# Patient Record
Sex: FEMALE | Race: BLACK OR AFRICAN AMERICAN | Employment: UNEMPLOYED | URBAN - METROPOLITAN AREA
[De-identification: names, ages, dates, MRNs, and addresses within clinical notes are randomized per-mention and may not be internally consistent; named-entity substitution may affect disease eponyms.]

---

## 2018-05-10 ENCOUNTER — HOSPITAL ENCOUNTER (OUTPATIENT)
Age: 63
Discharge: HOME | End: 2018-05-12
Payer: COMMERCIAL

## 2018-05-10 PROCEDURE — 87088 URINE BACTERIA CULTURE: CPT

## 2018-05-12 LAB — URINE CULTURE, ROUTINE: NORMAL

## 2018-06-11 ENCOUNTER — HOSPITAL ENCOUNTER (OUTPATIENT)
Age: 63
Discharge: HOME | End: 2018-06-13
Payer: COMMERCIAL

## 2018-06-11 ENCOUNTER — HOSPITAL ENCOUNTER (OUTPATIENT)
Dept: GENERAL RADIOLOGY | Age: 63
Discharge: HOME | End: 2018-06-13
Payer: COMMERCIAL

## 2018-06-11 DIAGNOSIS — R07.9 CHEST PAIN, UNSPECIFIED TYPE: ICD-10-CM

## 2018-06-11 PROCEDURE — 71046 X-RAY EXAM CHEST 2 VIEWS: CPT

## 2018-06-12 ENCOUNTER — HOSPITAL ENCOUNTER (OUTPATIENT)
Dept: MAMMOGRAPHY | Age: 63
Discharge: HOME | End: 2018-06-14
Payer: COMMERCIAL

## 2018-06-12 DIAGNOSIS — Z12.39 BREAST SCREENING: ICD-10-CM

## 2018-06-12 DIAGNOSIS — N64.4 MASTALGIA: ICD-10-CM

## 2018-06-12 PROCEDURE — 77067 SCR MAMMO BI INCL CAD: CPT

## 2018-12-02 ENCOUNTER — HOSPITAL ENCOUNTER (EMERGENCY)
Age: 63
Discharge: HOME | End: 2018-12-03
Attending: EMERGENCY MEDICINE
Payer: COMMERCIAL

## 2018-12-02 DIAGNOSIS — R00.2 PALPITATIONS: Primary | ICD-10-CM

## 2018-12-02 DIAGNOSIS — R07.9 CHEST PAIN, UNSPECIFIED TYPE: ICD-10-CM

## 2018-12-02 PROCEDURE — 99285 EMERGENCY DEPT VISIT HI MDM: CPT

## 2018-12-02 PROCEDURE — 6370000000 HC RX 637 (ALT 250 FOR IP): Performed by: EMERGENCY MEDICINE

## 2018-12-02 PROCEDURE — 94760 N-INVAS EAR/PLS OXIMETRY 1: CPT

## 2018-12-02 RX ORDER — ASPIRIN 81 MG/1
324 TABLET, CHEWABLE ORAL ONCE
Status: COMPLETED | OUTPATIENT
Start: 2018-12-03 | End: 2018-12-02

## 2018-12-02 RX ADMIN — ASPIRIN 81 MG 324 MG: 81 TABLET ORAL at 23:55

## 2018-12-02 ASSESSMENT — ENCOUNTER SYMPTOMS
EYE DISCHARGE: 0
ABDOMINAL DISTENTION: 0
WHEEZING: 0
DIARRHEA: 0
EYE PAIN: 0
VOMITING: 0
EYE REDNESS: 0
SHORTNESS OF BREATH: 1
SORE THROAT: 0
NAUSEA: 0
COUGH: 0
SINUS PRESSURE: 0
BACK PAIN: 0

## 2018-12-02 ASSESSMENT — PAIN SCALES - GENERAL: PAINLEVEL_OUTOF10: 5

## 2018-12-02 ASSESSMENT — PAIN DESCRIPTION - ORIENTATION: ORIENTATION: MID

## 2018-12-02 ASSESSMENT — PAIN DESCRIPTION - PAIN TYPE: TYPE: ACUTE PAIN

## 2018-12-03 ENCOUNTER — APPOINTMENT (OUTPATIENT)
Dept: GENERAL RADIOLOGY | Age: 63
End: 2018-12-03
Payer: COMMERCIAL

## 2018-12-03 VITALS
BODY MASS INDEX: 40.5 KG/M2 | OXYGEN SATURATION: 98 % | SYSTOLIC BLOOD PRESSURE: 158 MMHG | DIASTOLIC BLOOD PRESSURE: 73 MMHG | HEIGHT: 66 IN | TEMPERATURE: 98.1 F | HEART RATE: 94 BPM | RESPIRATION RATE: 20 BRPM | WEIGHT: 252 LBS

## 2018-12-03 LAB
ANION GAP SERPL CALCULATED.3IONS-SCNC: 13 MMOL/L (ref 7–16)
BUN BLDV-MCNC: 14 MG/DL (ref 8–23)
CALCIUM SERPL-MCNC: 9.5 MG/DL (ref 8.6–10.2)
CHLORIDE BLD-SCNC: 99 MMOL/L (ref 98–107)
CO2: 29 MMOL/L (ref 22–29)
CREAT SERPL-MCNC: 1 MG/DL (ref 0.5–1)
EKG ATRIAL RATE: 99 BPM
EKG P AXIS: 49 DEGREES
EKG P-R INTERVAL: 182 MS
EKG Q-T INTERVAL: 354 MS
EKG QRS DURATION: 80 MS
EKG QTC CALCULATION (BAZETT): 454 MS
EKG R AXIS: 23 DEGREES
EKG T AXIS: 35 DEGREES
EKG VENTRICULAR RATE: 99 BPM
GFR AFRICAN AMERICAN: >60
GFR NON-AFRICAN AMERICAN: >60 ML/MIN/1.73
GLUCOSE BLD-MCNC: 155 MG/DL (ref 74–99)
HCT VFR BLD CALC: 41.2 % (ref 34–48)
HEMOGLOBIN: 13.3 G/DL (ref 11.5–15.5)
MCH RBC QN AUTO: 27 PG (ref 26–35)
MCHC RBC AUTO-ENTMCNC: 32.3 % (ref 32–34.5)
MCV RBC AUTO: 83.6 FL (ref 80–99.9)
PDW BLD-RTO: 13.1 FL (ref 11.5–15)
PLATELET # BLD: 237 E9/L (ref 130–450)
PMV BLD AUTO: 10 FL (ref 7–12)
POTASSIUM SERPL-SCNC: 3.5 MMOL/L (ref 3.5–5)
RBC # BLD: 4.93 E12/L (ref 3.5–5.5)
SODIUM BLD-SCNC: 141 MMOL/L (ref 132–146)
TROPONIN: <0.01 NG/ML (ref 0–0.03)
WBC # BLD: 6 E9/L (ref 4.5–11.5)

## 2018-12-03 PROCEDURE — 71045 X-RAY EXAM CHEST 1 VIEW: CPT

## 2018-12-03 PROCEDURE — 85027 COMPLETE CBC AUTOMATED: CPT

## 2018-12-03 PROCEDURE — 80048 BASIC METABOLIC PNL TOTAL CA: CPT

## 2018-12-03 PROCEDURE — 84484 ASSAY OF TROPONIN QUANT: CPT

## 2018-12-03 PROCEDURE — 36415 COLL VENOUS BLD VENIPUNCTURE: CPT

## 2018-12-03 PROCEDURE — 93005 ELECTROCARDIOGRAM TRACING: CPT | Performed by: EMERGENCY MEDICINE

## 2018-12-03 ASSESSMENT — HEART SCORE: ECG: 0

## 2018-12-03 NOTE — ED PROVIDER NOTES
g/dL    Hematocrit 41.2 34.0 - 48.0 %    MCV 83.6 80.0 - 99.9 fL    MCH 27.0 26.0 - 35.0 pg    MCHC 32.3 32.0 - 34.5 %    RDW 13.1 11.5 - 15.0 fL    Platelets 994 738 - 683 E9/L    MPV 10.0 7.0 - 12.0 fL   Troponin   Result Value Ref Range    Troponin <0.01 0.00 - 0.03 ng/mL   EKG 12 Lead   Result Value Ref Range    Ventricular Rate 99 BPM    Atrial Rate 99 BPM    P-R Interval 182 ms    QRS Duration 80 ms    Q-T Interval 354 ms    QTc Calculation (Bazett) 454 ms    P Axis 49 degrees    R Axis 23 degrees    T Axis 35 degrees       Radiology:  XR CHEST PORTABLE    (Results Pending)     This X-Ray is independently viewed and interpreted by me:   - Study: Chest X-Ray   - Number of Views: 1  - Findings: No acute cardiopulmonary disease.      ------------------------- NURSING NOTES AND VITALS REVIEWED ---------------------------  Date / Time Roomed:  12/2/2018 11:35 PM  ED Bed Assignment:  04/04    The nursing notes within the ED encounter and vital signs as below have been reviewed. BP (!) 158/73   Pulse 94   Temp 98.1 °F (36.7 °C) (Oral)   Resp 20   Ht 5' 6\" (1.676 m)   Wt 252 lb (114.3 kg)   SpO2 98%   BMI 40.67 kg/m²   Oxygen Saturation Interpretation: Normal      ------------------------------------------ PROGRESS NOTES ------------------------------------------  I have spoken with the patient and discussed todays results, in addition to providing specific details for the plan of care and counseling regarding the diagnosis and prognosis. Their questions are answered at this time and they are agreeable with the plan. I discussed at length with them reasons for immediate return here for re evaluation. They will followup with primary care by calling their office tomorrow. --------------------------------- ADDITIONAL PROVIDER NOTES ---------------------------------  At this time the patient is without objective evidence of an acute process requiring hospitalization or inpatient management.   They have

## 2018-12-17 ENCOUNTER — OFFICE VISIT (OUTPATIENT)
Dept: CARDIOLOGY CLINIC | Age: 63
End: 2018-12-17
Payer: COMMERCIAL

## 2018-12-17 VITALS
SYSTOLIC BLOOD PRESSURE: 122 MMHG | DIASTOLIC BLOOD PRESSURE: 60 MMHG | WEIGHT: 246 LBS | BODY MASS INDEX: 39.53 KG/M2 | HEART RATE: 71 BPM | HEIGHT: 66 IN

## 2018-12-17 DIAGNOSIS — E66.9 NON MORBID OBESITY: ICD-10-CM

## 2018-12-17 DIAGNOSIS — F41.9 ANXIETY: ICD-10-CM

## 2018-12-17 DIAGNOSIS — I49.3 PVCS (PREMATURE VENTRICULAR CONTRACTIONS): ICD-10-CM

## 2018-12-17 DIAGNOSIS — I25.2 HISTORY OF MI (MYOCARDIAL INFARCTION): ICD-10-CM

## 2018-12-17 DIAGNOSIS — J45.20 MILD INTERMITTENT ASTHMA WITHOUT COMPLICATION: ICD-10-CM

## 2018-12-17 DIAGNOSIS — I10 ESSENTIAL HYPERTENSION: ICD-10-CM

## 2018-12-17 DIAGNOSIS — E11.9 CONTROLLED TYPE 2 DIABETES MELLITUS WITHOUT COMPLICATION, WITHOUT LONG-TERM CURRENT USE OF INSULIN (HCC): ICD-10-CM

## 2018-12-17 DIAGNOSIS — K21.9 GASTROESOPHAGEAL REFLUX DISEASE WITHOUT ESOPHAGITIS: ICD-10-CM

## 2018-12-17 DIAGNOSIS — R00.2 HEART PALPITATIONS: Primary | ICD-10-CM

## 2018-12-17 PROCEDURE — 93000 ELECTROCARDIOGRAM COMPLETE: CPT | Performed by: INTERNAL MEDICINE

## 2018-12-17 PROCEDURE — 99204 OFFICE O/P NEW MOD 45 MIN: CPT | Performed by: INTERNAL MEDICINE

## 2018-12-17 RX ORDER — FERROUS SULFATE 325(65) MG
325 TABLET ORAL
COMMUNITY

## 2018-12-17 RX ORDER — ASPIRIN 81 MG/1
81 TABLET ORAL DAILY
Qty: 90 TABLET | Refills: 11
Start: 2018-12-17

## 2018-12-17 RX ORDER — ATORVASTATIN CALCIUM 20 MG/1
20 TABLET, FILM COATED ORAL DAILY
COMMUNITY

## 2018-12-17 RX ORDER — ERGOCALCIFEROL 1.25 MG/1
50000 CAPSULE ORAL WEEKLY
COMMUNITY

## 2018-12-17 RX ORDER — FLUTICASONE FUROATE AND VILANTEROL 100; 25 UG/1; UG/1
POWDER RESPIRATORY (INHALATION) DAILY
COMMUNITY

## 2019-01-07 DIAGNOSIS — I49.3 PVCS (PREMATURE VENTRICULAR CONTRACTIONS): ICD-10-CM

## 2019-01-07 DIAGNOSIS — R00.2 HEART PALPITATIONS: ICD-10-CM

## 2019-01-14 ENCOUNTER — TELEPHONE (OUTPATIENT)
Dept: CARDIOLOGY CLINIC | Age: 64
End: 2019-01-14

## 2019-03-02 ENCOUNTER — APPOINTMENT (OUTPATIENT)
Dept: CT IMAGING | Age: 64
End: 2019-03-02
Payer: COMMERCIAL

## 2019-03-02 ENCOUNTER — APPOINTMENT (OUTPATIENT)
Dept: ULTRASOUND IMAGING | Age: 64
End: 2019-03-02
Payer: COMMERCIAL

## 2019-03-02 ENCOUNTER — HOSPITAL ENCOUNTER (EMERGENCY)
Age: 64
Discharge: HOME | End: 2019-03-02
Attending: EMERGENCY MEDICINE
Payer: COMMERCIAL

## 2019-03-02 VITALS
HEART RATE: 70 BPM | RESPIRATION RATE: 12 BRPM | OXYGEN SATURATION: 97 % | SYSTOLIC BLOOD PRESSURE: 144 MMHG | DIASTOLIC BLOOD PRESSURE: 89 MMHG | TEMPERATURE: 98.3 F

## 2019-03-02 DIAGNOSIS — R07.81 RIB PAIN ON RIGHT SIDE: ICD-10-CM

## 2019-03-02 DIAGNOSIS — I26.99 PE (PULMONARY THROMBOEMBOLISM) (HCC): ICD-10-CM

## 2019-03-02 DIAGNOSIS — F41.9 ANXIETY: Primary | ICD-10-CM

## 2019-03-02 LAB
ALBUMIN SERPL-MCNC: 4 G/DL (ref 3.5–5.2)
ALP BLD-CCNC: 142 U/L (ref 35–104)
ALT SERPL-CCNC: 20 U/L (ref 0–32)
ANION GAP SERPL CALCULATED.3IONS-SCNC: 13 MMOL/L (ref 7–16)
AST SERPL-CCNC: 20 U/L (ref 0–31)
BACTERIA: ABNORMAL /HPF
BASOPHILS ABSOLUTE: 0.03 E9/L (ref 0–0.2)
BASOPHILS RELATIVE PERCENT: 0.5 % (ref 0–2)
BILIRUB SERPL-MCNC: 0.4 MG/DL (ref 0–1.2)
BILIRUBIN URINE: NEGATIVE
BLOOD, URINE: NEGATIVE
BUN BLDV-MCNC: 8 MG/DL (ref 8–23)
CALCIUM SERPL-MCNC: 9.1 MG/DL (ref 8.6–10.2)
CHLORIDE BLD-SCNC: 97 MMOL/L (ref 98–107)
CLARITY: CLEAR
CO2: 26 MMOL/L (ref 22–29)
COLOR: ABNORMAL
CREAT SERPL-MCNC: 0.7 MG/DL (ref 0.5–1)
D DIMER: 550 NG/ML DDU
EOSINOPHILS ABSOLUTE: 0.18 E9/L (ref 0.05–0.5)
EOSINOPHILS RELATIVE PERCENT: 3.2 % (ref 0–6)
EPITHELIAL CELLS, UA: ABNORMAL /HPF
GFR AFRICAN AMERICAN: >60
GFR NON-AFRICAN AMERICAN: >60 ML/MIN/1.73
GLUCOSE BLD-MCNC: 199 MG/DL (ref 74–99)
GLUCOSE URINE: NEGATIVE MG/DL
HCT VFR BLD CALC: 40.2 % (ref 34–48)
HEMOGLOBIN: 12.9 G/DL (ref 11.5–15.5)
IMMATURE GRANULOCYTES #: 0.01 E9/L
IMMATURE GRANULOCYTES %: 0.2 % (ref 0–5)
KETONES, URINE: NEGATIVE MG/DL
LACTIC ACID: 1.5 MMOL/L (ref 0.5–2.2)
LEUKOCYTE ESTERASE, URINE: ABNORMAL
LIPASE: 44 U/L (ref 13–60)
LYMPHOCYTES ABSOLUTE: 2.06 E9/L (ref 1.5–4)
LYMPHOCYTES RELATIVE PERCENT: 36.5 % (ref 20–42)
MCH RBC QN AUTO: 26.4 PG (ref 26–35)
MCHC RBC AUTO-ENTMCNC: 32.1 % (ref 32–34.5)
MCV RBC AUTO: 82.4 FL (ref 80–99.9)
MONOCYTES ABSOLUTE: 0.42 E9/L (ref 0.1–0.95)
MONOCYTES RELATIVE PERCENT: 7.4 % (ref 2–12)
NEUTROPHILS ABSOLUTE: 2.94 E9/L (ref 1.8–7.3)
NEUTROPHILS RELATIVE PERCENT: 52.2 % (ref 43–80)
NITRITE, URINE: NEGATIVE
PDW BLD-RTO: 13.1 FL (ref 11.5–15)
PH UA: 5 (ref 5–9)
PLATELET # BLD: 223 E9/L (ref 130–450)
PMV BLD AUTO: 10.1 FL (ref 7–12)
POTASSIUM SERPL-SCNC: 3.5 MMOL/L (ref 3.5–5)
PROTEIN UA: NEGATIVE MG/DL
RBC # BLD: 4.88 E12/L (ref 3.5–5.5)
RBC UA: ABNORMAL /HPF (ref 0–2)
SODIUM BLD-SCNC: 136 MMOL/L (ref 132–146)
SPECIFIC GRAVITY UA: <=1.005 (ref 1–1.03)
TOTAL PROTEIN: 7 G/DL (ref 6.4–8.3)
TROPONIN: <0.01 NG/ML (ref 0–0.03)
UROBILINOGEN, URINE: 0.2 E.U./DL
WBC # BLD: 5.6 E9/L (ref 4.5–11.5)
WBC UA: ABNORMAL /HPF (ref 0–5)

## 2019-03-02 PROCEDURE — 93970 EXTREMITY STUDY: CPT

## 2019-03-02 PROCEDURE — 85025 COMPLETE CBC W/AUTO DIFF WBC: CPT

## 2019-03-02 PROCEDURE — 71275 CT ANGIOGRAPHY CHEST: CPT

## 2019-03-02 PROCEDURE — 81001 URINALYSIS AUTO W/SCOPE: CPT

## 2019-03-02 PROCEDURE — 80053 COMPREHEN METABOLIC PANEL: CPT

## 2019-03-02 PROCEDURE — 84484 ASSAY OF TROPONIN QUANT: CPT

## 2019-03-02 PROCEDURE — 6360000002 HC RX W HCPCS: Performed by: EMERGENCY MEDICINE

## 2019-03-02 PROCEDURE — 83690 ASSAY OF LIPASE: CPT

## 2019-03-02 PROCEDURE — 36415 COLL VENOUS BLD VENIPUNCTURE: CPT

## 2019-03-02 PROCEDURE — 99284 EMERGENCY DEPT VISIT MOD MDM: CPT

## 2019-03-02 PROCEDURE — 96374 THER/PROPH/DIAG INJ IV PUSH: CPT

## 2019-03-02 PROCEDURE — 85378 FIBRIN DEGRADE SEMIQUANT: CPT

## 2019-03-02 PROCEDURE — 6360000004 HC RX CONTRAST MEDICATION: Performed by: EMERGENCY MEDICINE

## 2019-03-02 PROCEDURE — 83605 ASSAY OF LACTIC ACID: CPT

## 2019-03-02 RX ORDER — LORAZEPAM 0.5 MG/1
0.5 TABLET ORAL 3 TIMES DAILY PRN
Qty: 10 TABLET | Refills: 0 | Status: SHIPPED | OUTPATIENT
Start: 2019-03-02 | End: 2019-04-01

## 2019-03-02 RX ORDER — KETOROLAC TROMETHAMINE 15 MG/ML
15 INJECTION, SOLUTION INTRAMUSCULAR; INTRAVENOUS ONCE
Status: COMPLETED | OUTPATIENT
Start: 2019-03-02 | End: 2019-03-02

## 2019-03-02 RX ADMIN — IOPAMIDOL 80 ML: 755 INJECTION, SOLUTION INTRAVENOUS at 06:45

## 2019-03-02 RX ADMIN — KETOROLAC TROMETHAMINE 15 MG: 15 INJECTION, SOLUTION INTRAMUSCULAR; INTRAVENOUS at 08:19

## 2019-03-02 ASSESSMENT — ENCOUNTER SYMPTOMS
SORE THROAT: 0
WHEEZING: 0
EYE REDNESS: 0
DIARRHEA: 0
SHORTNESS OF BREATH: 1
COUGH: 0
BACK PAIN: 0
VOMITING: 0
EYE PAIN: 0
ABDOMINAL DISTENTION: 0
NAUSEA: 0
EYE DISCHARGE: 0
SINUS PRESSURE: 0

## 2019-03-02 ASSESSMENT — PAIN SCALES - GENERAL: PAINLEVEL_OUTOF10: 5

## 2019-07-26 ENCOUNTER — HOSPITAL ENCOUNTER (EMERGENCY)
Age: 64
Discharge: HOME | End: 2019-07-26
Attending: EMERGENCY MEDICINE
Payer: COMMERCIAL

## 2019-07-26 ENCOUNTER — APPOINTMENT (OUTPATIENT)
Dept: CT IMAGING | Age: 64
End: 2019-07-26
Payer: COMMERCIAL

## 2019-07-26 VITALS
HEIGHT: 66 IN | TEMPERATURE: 97.5 F | SYSTOLIC BLOOD PRESSURE: 135 MMHG | WEIGHT: 246 LBS | OXYGEN SATURATION: 99 % | DIASTOLIC BLOOD PRESSURE: 87 MMHG | RESPIRATION RATE: 18 BRPM | BODY MASS INDEX: 39.53 KG/M2 | HEART RATE: 72 BPM

## 2019-07-26 DIAGNOSIS — R10.9 FLANK PAIN: Primary | ICD-10-CM

## 2019-07-26 LAB
ANION GAP SERPL CALCULATED.3IONS-SCNC: 12 MMOL/L (ref 7–16)
BACTERIA: ABNORMAL /HPF
BASOPHILS ABSOLUTE: 0.05 E9/L (ref 0–0.2)
BASOPHILS RELATIVE PERCENT: 0.6 % (ref 0–2)
BILIRUBIN URINE: NEGATIVE
BLOOD, URINE: NEGATIVE
BUN BLDV-MCNC: 15 MG/DL (ref 8–23)
CALCIUM SERPL-MCNC: 9.1 MG/DL (ref 8.6–10.2)
CHLORIDE BLD-SCNC: 97 MMOL/L (ref 98–107)
CLARITY: CLEAR
CO2: 27 MMOL/L (ref 22–29)
COLOR: YELLOW
CREAT SERPL-MCNC: 0.9 MG/DL (ref 0.5–1)
EOSINOPHILS ABSOLUTE: 0.33 E9/L (ref 0.05–0.5)
EOSINOPHILS RELATIVE PERCENT: 4.3 % (ref 0–6)
EPITHELIAL CELLS, UA: ABNORMAL /HPF
GFR AFRICAN AMERICAN: >60
GFR NON-AFRICAN AMERICAN: >60 ML/MIN/1.73
GLUCOSE BLD-MCNC: 94 MG/DL (ref 74–99)
GLUCOSE URINE: >=1000 MG/DL
HCT VFR BLD CALC: 38.9 % (ref 34–48)
HEMOGLOBIN: 12.6 G/DL (ref 11.5–15.5)
IMMATURE GRANULOCYTES #: 0.02 E9/L
IMMATURE GRANULOCYTES %: 0.3 % (ref 0–5)
KETONES, URINE: NEGATIVE MG/DL
LEUKOCYTE ESTERASE, URINE: ABNORMAL
LYMPHOCYTES ABSOLUTE: 2.98 E9/L (ref 1.5–4)
LYMPHOCYTES RELATIVE PERCENT: 38.5 % (ref 20–42)
MCH RBC QN AUTO: 27 PG (ref 26–35)
MCHC RBC AUTO-ENTMCNC: 32.4 % (ref 32–34.5)
MCV RBC AUTO: 83.3 FL (ref 80–99.9)
MONOCYTES ABSOLUTE: 0.68 E9/L (ref 0.1–0.95)
MONOCYTES RELATIVE PERCENT: 8.8 % (ref 2–12)
NEUTROPHILS ABSOLUTE: 3.69 E9/L (ref 1.8–7.3)
NEUTROPHILS RELATIVE PERCENT: 47.5 % (ref 43–80)
NITRITE, URINE: NEGATIVE
PDW BLD-RTO: 12.6 FL (ref 11.5–15)
PH UA: 6 (ref 5–9)
PLATELET # BLD: 254 E9/L (ref 130–450)
PMV BLD AUTO: 9.8 FL (ref 7–12)
POTASSIUM SERPL-SCNC: 4.2 MMOL/L (ref 3.5–5)
PROTEIN UA: NEGATIVE MG/DL
RBC # BLD: 4.67 E12/L (ref 3.5–5.5)
RBC UA: ABNORMAL /HPF (ref 0–2)
SODIUM BLD-SCNC: 136 MMOL/L (ref 132–146)
SPECIFIC GRAVITY UA: <=1.005 (ref 1–1.03)
UROBILINOGEN, URINE: 0.2 E.U./DL
WBC # BLD: 7.8 E9/L (ref 4.5–11.5)
WBC UA: ABNORMAL /HPF (ref 0–5)
YEAST: ABNORMAL

## 2019-07-26 PROCEDURE — 36415 COLL VENOUS BLD VENIPUNCTURE: CPT

## 2019-07-26 PROCEDURE — 81001 URINALYSIS AUTO W/SCOPE: CPT

## 2019-07-26 PROCEDURE — 74176 CT ABD & PELVIS W/O CONTRAST: CPT

## 2019-07-26 PROCEDURE — 6360000002 HC RX W HCPCS: Performed by: EMERGENCY MEDICINE

## 2019-07-26 PROCEDURE — 85025 COMPLETE CBC W/AUTO DIFF WBC: CPT

## 2019-07-26 PROCEDURE — 80048 BASIC METABOLIC PNL TOTAL CA: CPT

## 2019-07-26 PROCEDURE — 99284 EMERGENCY DEPT VISIT MOD MDM: CPT

## 2019-07-26 PROCEDURE — 96374 THER/PROPH/DIAG INJ IV PUSH: CPT

## 2019-07-26 RX ORDER — UBIDECARENONE 75 MG
50 CAPSULE ORAL DAILY
COMMUNITY

## 2019-07-26 RX ORDER — KETOROLAC TROMETHAMINE 15 MG/ML
15 INJECTION, SOLUTION INTRAMUSCULAR; INTRAVENOUS ONCE
Status: COMPLETED | OUTPATIENT
Start: 2019-07-26 | End: 2019-07-26

## 2019-07-26 RX ORDER — MONTELUKAST SODIUM 10 MG/1
10 TABLET ORAL NIGHTLY
COMMUNITY

## 2019-07-26 RX ADMIN — KETOROLAC TROMETHAMINE 15 MG: 15 INJECTION, SOLUTION INTRAMUSCULAR; INTRAVENOUS at 01:27

## 2019-07-26 ASSESSMENT — ENCOUNTER SYMPTOMS
VOMITING: 0
EYE DISCHARGE: 0
BACK PAIN: 0
NAUSEA: 0
SORE THROAT: 0
SINUS PRESSURE: 0
COUGH: 0
WHEEZING: 0
EYE PAIN: 0
SHORTNESS OF BREATH: 0
ABDOMINAL DISTENTION: 0
EYE REDNESS: 0
DIARRHEA: 0

## 2019-07-26 ASSESSMENT — PAIN DESCRIPTION - DESCRIPTORS: DESCRIPTORS: STABBING

## 2019-07-26 ASSESSMENT — PAIN DESCRIPTION - PAIN TYPE: TYPE: ACUTE PAIN

## 2019-07-26 ASSESSMENT — PAIN SCALES - GENERAL: PAINLEVEL_OUTOF10: 8

## 2019-07-26 ASSESSMENT — PAIN DESCRIPTION - ORIENTATION: ORIENTATION: LEFT

## 2019-07-26 ASSESSMENT — PAIN DESCRIPTION - LOCATION: LOCATION: FLANK

## 2019-07-26 NOTE — ED PROVIDER NOTES
Rx listed below.  Preferred pharmacy has been set up and verified.         °F (36.4 °C) (Oral)   Resp 22   Ht 5' 6\" (1.676 m)   Wt 246 lb (111.6 kg)   SpO2 99%   BMI 39.71 kg/m²   Oxygen Saturation Interpretation: Normal      ------------------------------------------ PROGRESS NOTES ------------------------------------------  I have spoken with the patient and discussed todays results, in addition to providing specific details for the plan of care and counseling regarding the diagnosis and prognosis. Their questions are answered at this time and they are agreeable with the plan. I discussed at length with them reasons for immediate return here for re evaluation. They will followup with primary care by calling their office tomorrow. --------------------------------- ADDITIONAL PROVIDER NOTES ---------------------------------  At this time the patient is without objective evidence of an acute process requiring hospitalization or inpatient management. They have remained hemodynamically stable throughout their entire ED visit and are stable for discharge with outpatient follow-up. The plan has been discussed in detail and they are aware of the specific conditions for emergent return, as well as the importance of follow-up. New Prescriptions    No medications on file       Diagnosis:  1. Flank pain        Disposition:  Patient's disposition: Discharge to home  Patient's condition is stable.               [address],   07/26/19 3857

## 2019-07-31 ENCOUNTER — HOSPITAL ENCOUNTER (OUTPATIENT)
Age: 64
End: 2019-07-31
Payer: COMMERCIAL

## 2019-07-31 ENCOUNTER — HOSPITAL ENCOUNTER (OUTPATIENT)
Dept: ULTRASOUND IMAGING | Age: 64
Discharge: HOME | End: 2019-07-31
Payer: COMMERCIAL

## 2019-07-31 DIAGNOSIS — K76.0 FATTY LIVER: ICD-10-CM

## 2019-07-31 PROCEDURE — 76705 ECHO EXAM OF ABDOMEN: CPT

## 2019-08-27 ENCOUNTER — OFFICE VISIT (OUTPATIENT)
Dept: CARDIOLOGY CLINIC | Age: 64
End: 2019-08-27
Payer: COMMERCIAL

## 2019-08-27 VITALS
WEIGHT: 241 LBS | DIASTOLIC BLOOD PRESSURE: 62 MMHG | HEIGHT: 66 IN | SYSTOLIC BLOOD PRESSURE: 102 MMHG | BODY MASS INDEX: 38.73 KG/M2 | HEART RATE: 77 BPM

## 2019-08-27 DIAGNOSIS — J45.20 MILD INTERMITTENT ASTHMA WITHOUT COMPLICATION: ICD-10-CM

## 2019-08-27 DIAGNOSIS — E11.9 CONTROLLED TYPE 2 DIABETES MELLITUS WITHOUT COMPLICATION, WITHOUT LONG-TERM CURRENT USE OF INSULIN (HCC): ICD-10-CM

## 2019-08-27 DIAGNOSIS — E66.9 NON MORBID OBESITY: ICD-10-CM

## 2019-08-27 DIAGNOSIS — I49.3 PVCS (PREMATURE VENTRICULAR CONTRACTIONS): Primary | ICD-10-CM

## 2019-08-27 DIAGNOSIS — I10 ESSENTIAL HYPERTENSION: ICD-10-CM

## 2019-08-27 DIAGNOSIS — I25.2 HISTORY OF MI (MYOCARDIAL INFARCTION): ICD-10-CM

## 2019-08-27 PROCEDURE — 99214 OFFICE O/P EST MOD 30 MIN: CPT | Performed by: INTERNAL MEDICINE

## 2019-08-27 PROCEDURE — 93000 ELECTROCARDIOGRAM COMPLETE: CPT | Performed by: INTERNAL MEDICINE

## 2019-08-27 RX ORDER — BRIMONIDINE TARTRATE, TIMOLOL MALEATE 2; 5 MG/ML; MG/ML
1 SOLUTION/ DROPS OPHTHALMIC EVERY 12 HOURS
COMMUNITY

## 2019-08-27 NOTE — PROGRESS NOTES
HFA) 108 (90 BASE) MCG/ACT inhaler, Inhale 2 puffs into the lungs every 6 hours as needed. , Disp: , Rfl:     VERAPAMIL HCL, Take 120 mg by mouth three times daily. , Disp: , Rfl:     losartan (COZAAR) 25 MG tablet, Take 50 mg by mouth daily , Disp: , Rfl:       S: REASON FOR VISIT:       Chief Complaint   Patient presents with    Palpitations     6 month check . Patient denies any cp sob or dizziness. PAtient has been doing well. History of Present Illness:       Office Visit for follow up of PVcs, HTN, Hx of MI     No hospitalizations or surgeries since last visit     Xiang any exertional chest pain or short of breath   No palpitations, dizzy or syncope. Active at home   No orthopnea   Try to watch diet   Compliant with all medications       Past Medical History:   Diagnosis Date    Arthritis     Asthma     Diabetes mellitus ([facility] [address]     Hypertension     MI, old 2002            Past Surgical History:   Procedure Laterality Date    BREAST SURGERY      benign calcium deposit    CARDIAC CATHETERIZATION      x2  negative    CATARACT REMOVAL WITH IMPLANT Right 11 18 2013    CATARACT REMOVAL WITH IMPLANT Left 1/29/2014    phacoemulsification cataract extraction with posterior chamber intraocular mens implant lef teye    CHOLECYSTECTOMY      TUBAL LIGATION  ?           Family History   Problem Relation Age of Onset    Heart Disease Mother     Other Mother     Heart Attack Father           Social History     Tobacco Use    Smoking status: Never Smoker    Smokeless tobacco: Never Used   Substance Use Topics    Alcohol use: No     Comment: Coffee 1 a day          Review of Systems:  HEENT: negative for acute visual symptoms or auditory problems, no dysphagia  Constitutional: negative for fever and chills, or significant weight loss  Respiratory: negative for cough, wheezing, or hemoptysis  Cardiovascular: negative for chest pain, palpitations, and dyspnea  Gastrointestinal: negative for

## 2019-09-13 ENCOUNTER — HOSPITAL ENCOUNTER (OUTPATIENT)
Dept: PHYSICAL THERAPY | Age: 64
Setting detail: THERAPIES SERIES
Discharge: HOME | End: 2019-09-13
Payer: COMMERCIAL

## 2019-09-13 PROCEDURE — 97110 THERAPEUTIC EXERCISES: CPT | Performed by: PHYSICAL THERAPIST

## 2019-09-13 PROCEDURE — 97162 PT EVAL MOD COMPLEX 30 MIN: CPT | Performed by: PHYSICAL THERAPIST

## 2019-09-13 PROCEDURE — G0283 ELEC STIM OTHER THAN WOUND: HCPCS | Performed by: PHYSICAL THERAPIST

## 2019-09-18 ENCOUNTER — HOSPITAL ENCOUNTER (OUTPATIENT)
Dept: PHYSICAL THERAPY | Age: 64
Setting detail: THERAPIES SERIES
Discharge: HOME | End: 2019-09-18
Payer: COMMERCIAL

## 2019-09-18 PROCEDURE — 97110 THERAPEUTIC EXERCISES: CPT

## 2019-09-18 PROCEDURE — 97530 THERAPEUTIC ACTIVITIES: CPT

## 2019-09-18 PROCEDURE — G0283 ELEC STIM OTHER THAN WOUND: HCPCS

## 2019-09-19 ENCOUNTER — HOSPITAL ENCOUNTER (OUTPATIENT)
Dept: PHYSICAL THERAPY | Age: 64
Setting detail: THERAPIES SERIES
Discharge: HOME | End: 2019-09-19
Payer: COMMERCIAL

## 2019-09-19 PROCEDURE — 97110 THERAPEUTIC EXERCISES: CPT | Performed by: PHYSICAL THERAPIST

## 2019-09-19 PROCEDURE — G0283 ELEC STIM OTHER THAN WOUND: HCPCS | Performed by: PHYSICAL THERAPIST

## 2019-09-19 PROCEDURE — 97530 THERAPEUTIC ACTIVITIES: CPT | Performed by: PHYSICAL THERAPIST

## 2019-09-24 ENCOUNTER — HOSPITAL ENCOUNTER (OUTPATIENT)
Dept: PHYSICAL THERAPY | Age: 64
Setting detail: THERAPIES SERIES
Discharge: HOME | End: 2019-09-24
Payer: COMMERCIAL

## 2019-09-24 PROCEDURE — 97110 THERAPEUTIC EXERCISES: CPT

## 2019-09-24 PROCEDURE — 97530 THERAPEUTIC ACTIVITIES: CPT

## 2019-09-24 PROCEDURE — G0283 ELEC STIM OTHER THAN WOUND: HCPCS

## 2019-09-26 ENCOUNTER — HOSPITAL ENCOUNTER (OUTPATIENT)
Dept: PHYSICAL THERAPY | Age: 64
Setting detail: THERAPIES SERIES
Discharge: HOME | End: 2019-09-26
Payer: COMMERCIAL

## 2019-09-26 PROCEDURE — G0283 ELEC STIM OTHER THAN WOUND: HCPCS | Performed by: PHYSICAL THERAPIST

## 2019-09-26 PROCEDURE — 97110 THERAPEUTIC EXERCISES: CPT | Performed by: PHYSICAL THERAPIST

## 2019-09-26 PROCEDURE — 97530 THERAPEUTIC ACTIVITIES: CPT | Performed by: PHYSICAL THERAPIST

## 2019-10-01 ENCOUNTER — HOSPITAL ENCOUNTER (OUTPATIENT)
Dept: PHYSICAL THERAPY | Age: 64
Setting detail: THERAPIES SERIES
Discharge: HOME | End: 2019-10-01
Payer: COMMERCIAL

## 2019-10-01 PROCEDURE — G0283 ELEC STIM OTHER THAN WOUND: HCPCS

## 2019-10-01 PROCEDURE — 97530 THERAPEUTIC ACTIVITIES: CPT

## 2019-10-01 PROCEDURE — 97110 THERAPEUTIC EXERCISES: CPT

## 2019-10-03 ENCOUNTER — HOSPITAL ENCOUNTER (OUTPATIENT)
Dept: PHYSICAL THERAPY | Age: 64
Setting detail: THERAPIES SERIES
Discharge: HOME | End: 2019-10-03
Payer: COMMERCIAL

## 2019-10-03 PROCEDURE — 97110 THERAPEUTIC EXERCISES: CPT | Performed by: PHYSICAL THERAPIST

## 2019-10-03 PROCEDURE — 97530 THERAPEUTIC ACTIVITIES: CPT | Performed by: PHYSICAL THERAPIST

## 2019-10-03 PROCEDURE — G0283 ELEC STIM OTHER THAN WOUND: HCPCS | Performed by: PHYSICAL THERAPIST

## 2019-10-08 ENCOUNTER — HOSPITAL ENCOUNTER (OUTPATIENT)
Dept: PHYSICAL THERAPY | Age: 64
Setting detail: THERAPIES SERIES
Discharge: HOME | End: 2019-10-08
Payer: COMMERCIAL

## 2019-10-08 PROCEDURE — 97110 THERAPEUTIC EXERCISES: CPT

## 2019-10-08 PROCEDURE — 97530 THERAPEUTIC ACTIVITIES: CPT

## 2019-10-08 PROCEDURE — G0283 ELEC STIM OTHER THAN WOUND: HCPCS

## 2019-10-10 ENCOUNTER — HOSPITAL ENCOUNTER (OUTPATIENT)
Dept: PHYSICAL THERAPY | Age: 64
Setting detail: THERAPIES SERIES
Discharge: HOME | End: 2019-10-10
Payer: COMMERCIAL

## 2019-10-10 PROCEDURE — G0283 ELEC STIM OTHER THAN WOUND: HCPCS | Performed by: PHYSICAL THERAPIST

## 2019-10-10 PROCEDURE — 97110 THERAPEUTIC EXERCISES: CPT | Performed by: PHYSICAL THERAPIST

## 2019-10-10 PROCEDURE — 97530 THERAPEUTIC ACTIVITIES: CPT | Performed by: PHYSICAL THERAPIST

## 2019-10-11 ENCOUNTER — HOSPITAL ENCOUNTER (OUTPATIENT)
Dept: GENERAL RADIOLOGY | Age: 64
Discharge: HOME | End: 2019-10-13
Payer: COMMERCIAL

## 2019-10-11 ENCOUNTER — HOSPITAL ENCOUNTER (OUTPATIENT)
Age: 64
Discharge: HOME | End: 2019-10-13
Payer: COMMERCIAL

## 2019-10-11 DIAGNOSIS — M48.061 BILATERAL STENOSIS OF LATERAL RECESS OF LUMBAR SPINE: ICD-10-CM

## 2019-10-11 PROCEDURE — 72100 X-RAY EXAM L-S SPINE 2/3 VWS: CPT

## 2019-10-15 ENCOUNTER — HOSPITAL ENCOUNTER (OUTPATIENT)
Dept: PHYSICAL THERAPY | Age: 64
Setting detail: THERAPIES SERIES
Discharge: HOME | End: 2019-10-15
Payer: COMMERCIAL

## 2019-10-15 PROCEDURE — G0283 ELEC STIM OTHER THAN WOUND: HCPCS

## 2019-10-15 PROCEDURE — 97110 THERAPEUTIC EXERCISES: CPT

## 2019-10-15 PROCEDURE — 97530 THERAPEUTIC ACTIVITIES: CPT

## 2019-10-17 ENCOUNTER — HOSPITAL ENCOUNTER (OUTPATIENT)
Dept: PHYSICAL THERAPY | Age: 64
Setting detail: THERAPIES SERIES
Discharge: HOME | End: 2019-10-17
Payer: COMMERCIAL

## 2019-10-17 PROCEDURE — G0283 ELEC STIM OTHER THAN WOUND: HCPCS | Performed by: PHYSICAL THERAPIST

## 2019-10-17 PROCEDURE — 97530 THERAPEUTIC ACTIVITIES: CPT | Performed by: PHYSICAL THERAPIST

## 2019-10-17 PROCEDURE — 97110 THERAPEUTIC EXERCISES: CPT | Performed by: PHYSICAL THERAPIST

## 2019-10-22 ENCOUNTER — HOSPITAL ENCOUNTER (OUTPATIENT)
Dept: PHYSICAL THERAPY | Age: 64
Setting detail: THERAPIES SERIES
Discharge: HOME | End: 2019-10-22
Payer: COMMERCIAL

## 2019-10-22 PROCEDURE — G0283 ELEC STIM OTHER THAN WOUND: HCPCS

## 2019-10-22 PROCEDURE — 97110 THERAPEUTIC EXERCISES: CPT

## 2019-10-22 PROCEDURE — 97530 THERAPEUTIC ACTIVITIES: CPT

## 2019-10-24 ENCOUNTER — HOSPITAL ENCOUNTER (OUTPATIENT)
Dept: PHYSICAL THERAPY | Age: 64
Setting detail: THERAPIES SERIES
Discharge: HOME | End: 2019-10-24
Payer: COMMERCIAL

## 2019-10-24 PROCEDURE — 97530 THERAPEUTIC ACTIVITIES: CPT | Performed by: PHYSICAL THERAPIST

## 2019-10-24 PROCEDURE — G0283 ELEC STIM OTHER THAN WOUND: HCPCS | Performed by: PHYSICAL THERAPIST

## 2019-10-24 PROCEDURE — 97110 THERAPEUTIC EXERCISES: CPT | Performed by: PHYSICAL THERAPIST

## 2019-10-28 ENCOUNTER — HOSPITAL ENCOUNTER (OUTPATIENT)
Dept: PHYSICAL THERAPY | Age: 64
Setting detail: THERAPIES SERIES
Discharge: HOME | End: 2019-10-28
Payer: COMMERCIAL

## 2019-10-31 ENCOUNTER — HOSPITAL ENCOUNTER (OUTPATIENT)
Dept: PHYSICAL THERAPY | Age: 64
Setting detail: THERAPIES SERIES
Discharge: HOME | End: 2019-10-31
Payer: COMMERCIAL

## 2019-11-05 ENCOUNTER — HOSPITAL ENCOUNTER (OUTPATIENT)
Dept: PHYSICAL THERAPY | Age: 64
Setting detail: THERAPIES SERIES
Discharge: HOME | End: 2019-11-05
Payer: COMMERCIAL

## 2019-11-05 PROCEDURE — G0283 ELEC STIM OTHER THAN WOUND: HCPCS

## 2019-11-05 PROCEDURE — 97530 THERAPEUTIC ACTIVITIES: CPT

## 2019-11-05 PROCEDURE — 97110 THERAPEUTIC EXERCISES: CPT

## 2019-11-12 ENCOUNTER — HOSPITAL ENCOUNTER (OUTPATIENT)
Dept: PHYSICAL THERAPY | Age: 64
Setting detail: THERAPIES SERIES
Discharge: HOME | End: 2019-11-12
Payer: COMMERCIAL

## 2019-11-12 PROCEDURE — G0283 ELEC STIM OTHER THAN WOUND: HCPCS

## 2019-11-12 PROCEDURE — 97110 THERAPEUTIC EXERCISES: CPT

## 2019-11-14 ENCOUNTER — HOSPITAL ENCOUNTER (OUTPATIENT)
Dept: PHYSICAL THERAPY | Age: 64
Setting detail: THERAPIES SERIES
Discharge: HOME | End: 2019-11-14
Payer: COMMERCIAL

## 2019-11-14 PROCEDURE — 97530 THERAPEUTIC ACTIVITIES: CPT | Performed by: PHYSICAL THERAPIST

## 2019-11-14 PROCEDURE — 97110 THERAPEUTIC EXERCISES: CPT | Performed by: PHYSICAL THERAPIST

## 2019-11-19 ENCOUNTER — HOSPITAL ENCOUNTER (OUTPATIENT)
Dept: PHYSICAL THERAPY | Age: 64
Setting detail: THERAPIES SERIES
Discharge: HOME | End: 2019-11-19
Payer: COMMERCIAL

## 2019-11-19 PROCEDURE — 97110 THERAPEUTIC EXERCISES: CPT

## 2019-11-19 PROCEDURE — 97530 THERAPEUTIC ACTIVITIES: CPT

## 2019-11-21 ENCOUNTER — HOSPITAL ENCOUNTER (OUTPATIENT)
Dept: PHYSICAL THERAPY | Age: 64
Setting detail: THERAPIES SERIES
Discharge: HOME | End: 2019-11-21
Payer: COMMERCIAL

## 2019-11-26 ENCOUNTER — HOSPITAL ENCOUNTER (OUTPATIENT)
Dept: PHYSICAL THERAPY | Age: 64
Setting detail: THERAPIES SERIES
Discharge: HOME | End: 2019-11-26
Payer: COMMERCIAL

## 2019-11-26 PROCEDURE — 97530 THERAPEUTIC ACTIVITIES: CPT

## 2019-11-26 PROCEDURE — 97110 THERAPEUTIC EXERCISES: CPT

## 2020-04-28 ENCOUNTER — HOSPITAL ENCOUNTER (EMERGENCY)
Age: 65
Discharge: HOME | End: 2020-04-29
Attending: EMERGENCY MEDICINE
Payer: COMMERCIAL

## 2020-04-28 PROCEDURE — 99283 EMERGENCY DEPT VISIT LOW MDM: CPT

## 2020-04-28 ASSESSMENT — PAIN DESCRIPTION - ONSET: ONSET: ON-GOING

## 2020-04-28 ASSESSMENT — PAIN SCALES - GENERAL: PAINLEVEL_OUTOF10: 5

## 2020-04-28 ASSESSMENT — PAIN DESCRIPTION - FREQUENCY: FREQUENCY: CONTINUOUS

## 2020-04-28 ASSESSMENT — PAIN DESCRIPTION - PROGRESSION: CLINICAL_PROGRESSION: NOT CHANGED

## 2020-04-28 ASSESSMENT — PAIN DESCRIPTION - PAIN TYPE: TYPE: ACUTE PAIN

## 2020-04-28 ASSESSMENT — PAIN DESCRIPTION - LOCATION: LOCATION: LEG

## 2020-04-28 ASSESSMENT — PAIN DESCRIPTION - DESCRIPTORS: DESCRIPTORS: SORE

## 2020-04-28 ASSESSMENT — PAIN DESCRIPTION - ORIENTATION: ORIENTATION: LEFT

## 2020-04-29 ENCOUNTER — APPOINTMENT (OUTPATIENT)
Dept: ULTRASOUND IMAGING | Age: 65
End: 2020-04-29
Payer: COMMERCIAL

## 2020-04-29 VITALS
BODY MASS INDEX: 37.45 KG/M2 | OXYGEN SATURATION: 96 % | RESPIRATION RATE: 18 BRPM | HEIGHT: 66 IN | DIASTOLIC BLOOD PRESSURE: 88 MMHG | WEIGHT: 233 LBS | SYSTOLIC BLOOD PRESSURE: 135 MMHG | TEMPERATURE: 98.6 F | HEART RATE: 72 BPM

## 2020-04-29 PROCEDURE — 93971 EXTREMITY STUDY: CPT

## 2020-04-29 ASSESSMENT — ENCOUNTER SYMPTOMS
SINUS PAIN: 0
RHINORRHEA: 0
VOMITING: 0
WHEEZING: 0
CONSTIPATION: 0
PHOTOPHOBIA: 0
SORE THROAT: 0
SHORTNESS OF BREATH: 0
SINUS PRESSURE: 0
NAUSEA: 0
DIARRHEA: 0
COUGH: 0
ABDOMINAL PAIN: 0

## 2020-04-29 NOTE — ED PROVIDER NOTES
Patient is a 72-year-old female who presents the chief complaint of right lower extremity pain. Patient states that 3 days ago she hit the back of her leg on the bed. She states that now that there is a knot behind her right knee and it was warm and swollen. She took 4 baby aspirin prior to arrival, and states that the warmth behind her right knee has improved. She still admits to having a knot behind her right knee. She denies any numbness or tingling of the extremity. She does admit to having a blue discoloration of her toes intermittently. She states that this is happened before and also happens on the left foot. She denies any known vascular issues. Denies any history of DVT/PE, recent travel, recent surgery, known coagulopathy, no malignancy, unilateral leg swelling. She denies any lightheadedness, dizziness, chest pain, shortness of breath, abdominal pain, nausea, vomiting. No blood thinner use. The history is provided by the patient. No  was used. Review of Systems   Constitutional: Negative for chills, fatigue and fever. HENT: Negative for congestion, rhinorrhea, sinus pressure, sinus pain, sneezing and sore throat. Eyes: Negative for photophobia and visual disturbance. Respiratory: Negative for cough, shortness of breath and wheezing. Cardiovascular: Negative for chest pain and palpitations. Gastrointestinal: Negative for abdominal pain, constipation, diarrhea, nausea and vomiting. Genitourinary: Negative for dysuria, frequency and hematuria. Musculoskeletal: Positive for myalgias (Pain behind right knee). Negative for neck pain and neck stiffness. Skin: Negative for rash and wound. Neurological: Negative for dizziness, light-headedness and headaches. Psychiatric/Behavioral: Negative for agitation, behavioral problems and confusion. Physical Exam  Constitutional:       General: She is not in acute distress.      Appearance: She is not Hypertension, and MI, old. Past Surgical History:  has a past surgical history that includes Tubal ligation (?); Cholecystectomy; Breast surgery; Cardiac catheterization; Cataract removal with implant (Right, 11 18 2013); and Cataract removal with implant (Left, 1/29/2014). Social History:  reports that she has never smoked. She has never used smokeless tobacco. She reports that she does not drink alcohol or use drugs. Family History: family history includes Heart Attack in her father; Heart Disease in her mother; Other in her mother. The patients home medications have been reviewed. Allergies: Patient has no known allergies. -------------------------------------------------- RESULTS -------------------------------------------------  Labs:  No results found for this visit on 04/28/20. Radiology:  US DUP LOWER EXTREMITY RIGHT TALI   Final Result   No evidence of DVT in the right lower extremity.             ------------------------- NURSING NOTES AND VITALS REVIEWED ---------------------------  Date / Time Roomed:  4/28/2020 11:28 PM  ED Bed Assignment:  12/12    The nursing notes within the ED encounter and vital signs as below have been reviewed. /88   Pulse 72   Temp 98.6 °F (37 °C) (Oral)   Resp 18   Ht 5' 6\" (1.676 m)   Wt 233 lb (105.7 kg)   SpO2 96%   BMI 37.61 kg/m²   Oxygen Saturation Interpretation: Normal      ------------------------------------------ PROGRESS NOTES ------------------------------------------  Patient's ultrasound results are still not back yet. She is resting comfortably in bed. Vitals are stable. 1:05 AM EDT  I have signed this patient out to the oncoming physician, Dr. Shaniqua Dumont. I have discussed the patient's initial exam, treatment and plan of care with the on coming physician. I have notified the patient / family of the change in treating physician and answered their questions to this point.       I have spoken with the patient and discussed todays results, in addition to providing specific details for the plan of care and counseling regarding the diagnosis and prognosis. Their questions are answered at this time and they are agreeable with the plan. I discussed at length with them reasons for immediate return here for re evaluation. They will followup with primary care by calling their office tomorrow. --------------------------------- ADDITIONAL PROVIDER NOTES ---------------------------------  At this time the patient is without objective evidence of an acute process requiring hospitalization or inpatient management. They have remained hemodynamically stable throughout their entire ED visit and are stable for discharge with outpatient follow-up. The plan has been discussed in detail and they are aware of the specific conditions for emergent return, as well as the importance of follow-up. Discharge Medication List as of 4/29/2020  1:24 AM          Diagnosis:  1. Right leg pain        Disposition:  Patient's disposition: Discharge to home  Patient's condition is stable.               Anthony Laureano DO  05/04/20 1216

## 2020-04-30 ENCOUNTER — CARE COORDINATION (OUTPATIENT)
Dept: CARE COORDINATION | Age: 65
End: 2020-04-30

## 2020-05-01 ENCOUNTER — CARE COORDINATION (OUTPATIENT)
Dept: CARE COORDINATION | Age: 65
End: 2020-05-01

## 2020-05-01 NOTE — CARE COORDINATION
COVID-19 ED/Flu Clinic Initial Outreach Note    Patient contacted regarding recent visit for viral symptoms. This Delicia Leonard attempted to contact the patient by telephone to perform post discharge call. Left message. Will try again next week.

## 2020-05-07 ENCOUNTER — CARE COORDINATION (OUTPATIENT)
Dept: CARE COORDINATION | Age: 65
End: 2020-05-07

## 2020-05-14 ENCOUNTER — HOSPITAL ENCOUNTER (OUTPATIENT)
Dept: INTERVENTIONAL RADIOLOGY/VASCULAR | Age: 65
Discharge: HOME | End: 2020-05-16
Payer: COMMERCIAL

## 2020-05-14 PROCEDURE — 93923 UPR/LXTR ART STDY 3+ LVLS: CPT

## 2020-05-21 ENCOUNTER — CARE COORDINATION (OUTPATIENT)
Dept: CARE COORDINATION | Age: 65
End: 2020-05-21

## 2020-05-21 NOTE — CARE COORDINATION
Patient contacted regarding COVID-19 risk and screening. This author contacted the patient by telephone to perform follow-up call. Verified name and  with patient as identifiers. Symptoms reviewed with patient. Patient reports symptoms are improving. Due to no new or worsening symptoms the RN CTN/ACM was not notified for escalation. This author reviewed discharge instructions, medical action plan and red flags such as increased shortness of breath, increasing fever, worsening cough or chest pain with patient who verbalized understanding. Discussed exposure protocols and quarantine with CDC Guidelines What To Do If You Are Sick    Patient who was given an opportunity for questions and concerns. The patient agrees to contact the [facility] exposure line [phone], local [facility] department [place] [facility]: ([phone])JENNIFER PCP office for questions related to their healthcare. Author provided contact information for future reference.

## 2020-07-06 ENCOUNTER — OFFICE VISIT (OUTPATIENT)
Dept: CARDIOLOGY CLINIC | Age: 65
End: 2020-07-06
Payer: COMMERCIAL

## 2020-07-06 VITALS
HEIGHT: 66 IN | BODY MASS INDEX: 38.41 KG/M2 | DIASTOLIC BLOOD PRESSURE: 80 MMHG | SYSTOLIC BLOOD PRESSURE: 134 MMHG | WEIGHT: 239 LBS | HEART RATE: 81 BPM

## 2020-07-06 PROCEDURE — 99214 OFFICE O/P EST MOD 30 MIN: CPT | Performed by: INTERNAL MEDICINE

## 2020-07-06 PROCEDURE — 93000 ELECTROCARDIOGRAM COMPLETE: CPT | Performed by: INTERNAL MEDICINE

## 2020-07-06 NOTE — PROGRESS NOTES
OFFICE VISIT     PRIMARY CARE PHYSICIAN:      Laurita Martinez MD       ALLERGIES / SENSITIVITIES:      No Known Allergies       REVIEWED MEDICATIONS:        Current Outpatient Medications:     brimonidine-timolol (COMBIGAN) 0.2-0.5 % ophthalmic solution, Place 1 drop into both eyes every 12 hours, Disp: , Rfl:     travoprost, benzalkonium, (TRAVATAN) 0.004 % ophthalmic solution, 1 drop nightly, Disp: , Rfl:     vitamin B-12 (CYANOCOBALAMIN) 100 MCG tablet, Take 50 mcg by mouth daily, Disp: , Rfl:     montelukast (SINGULAIR) 10 MG tablet, Take 10 mg by mouth nightly, Disp: , Rfl:     fluticasone-vilanterol (BREO ELLIPTA) 100-25 MCG/INH AEPB inhaler, Inhale into the lungs daily, Disp: , Rfl:     metFORMIN (GLUCOPHAGE) 500 MG tablet, Take 500 mg by mouth 2 times daily (with meals), Disp: , Rfl:     atorvastatin (LIPITOR) 20 MG tablet, Take 20 mg by mouth daily, Disp: , Rfl:     ferrous sulfate 325 (65 Fe) MG tablet, Take 325 mg by mouth daily (with breakfast), Disp: , Rfl:     Dulaglutide (TRULICITY) 1.5 ZZ/4.7EB SOPN, Inject into the skin once a week, Disp: , Rfl:     insulin lispro (HUMALOG) 100 UNIT/ML injection vial, Inject into the skin 3 times daily (before meals) 15 units for breakfast, 10 units lunch and dinner, Disp: , Rfl:     aspirin EC 81 MG EC tablet, Take 1 tablet by mouth daily, Disp: 90 tablet, Rfl: 11    hydrochlorothiazide (HYDRODIURIL) 25 MG tablet, Take 25 mg by mouth as needed. , Disp: , Rfl:     Insulin Glargine (LANTUS SC), Inject 35 Units into the skin nightly., Disp: , Rfl:     ALPRAZolam (XANAX) 1 MG tablet, Take 1 mg by mouth nightly as needed. , Disp: , Rfl:     metoprolol (LOPRESSOR) 50 MG tablet, Take 50 mg by mouth 2 times daily. , Disp: , Rfl:     albuterol (PROVENTIL HFA;VENTOLIN HFA) 108 (90 BASE) MCG/ACT inhaler, Inhale 2 puffs into the lungs every 6 hours as needed. , Disp: , Rfl:     VERAPAMIL HCL, Take 120 mg by mouth three times daily. , Disp: , Rfl:     losartan (COZAAR) 25 MG tablet, Take 50 mg by mouth daily , Disp: , Rfl:     vitamin D (ERGOCALCIFEROL) 31994 units CAPS capsule, Take 50,000 Units by mouth once a week, Disp: , Rfl:       S: REASON FOR VISIT:       Chief Complaint   Patient presents with    Palpitations     9 month. Pt has no cardiac complaints today          History of Present Illness:       Office Visit for follow up of PVCs, HTN,    62 yr pt with HX of HTN, PVCs, MI came for f/u visit   Hx of falls in 2018-19   C/o both leg pain, using cane   Occ toes are blue, only at night   No hospitalizations or surgeries since last visit   Patient is compliant with all medications   Xiang any exertional chest pain or short of breath   No palpitations, dizzy or syncope. No orthopnea   Try to watch diet          Past Medical History:   Diagnosis Date    Arthritis     Asthma     Diabetes mellitus ([facility] [address]     Falls frequently     Hypertension     MI, old 2002            Past Surgical History:   Procedure Laterality Date    BREAST SURGERY      benign calcium deposit    CARDIAC CATHETERIZATION      x2  negative    CATARACT REMOVAL WITH IMPLANT Right 11 18 2013    CATARACT REMOVAL WITH IMPLANT Left 1/29/2014    phacoemulsification cataract extraction with posterior chamber intraocular mens implant lef teye    CHOLECYSTECTOMY      TUBAL LIGATION  ?           Family History   Problem Relation Age of Onset    Heart Disease Mother     Other Mother     Heart Attack Father           Social History     Tobacco Use    Smoking status: Never Smoker    Smokeless tobacco: Never Used   Substance Use Topics    Alcohol use: No     Comment: pop weekly         Review of Systems:  Constitutional: negative for fever and chills, or significant weight loss  HEENT: negative for acute visual symptoms or auditory problems, no dysphagia  Respiratory: negative for cough, wheezing, or hemoptysis  Cardiovascular: negative for chest pain, palpitations, and dyspnea  Gastrointestinal: long-term current use of insulin [facility])     Preventive Cardiology: Low cholesterol diet, regular exercise as tolerate, and gradual weight loss discussed. Monitor BP and heart rates. Above recommendations discussed with her. She can travel from cardiac stand point   All questions answered about cardiac diagnoses and cardiac medications. Continue current medications. Compliance with medications and f/u with all physicians discussed. Risk factor modification based on risk profile discussed. Call if any exertional chest pain, short of breath, dizzy or palpitations   Follow up in 9 months or earlier if needed.          [facility]  [address], [place], [address] [facility]  [phone]

## 2020-09-23 ENCOUNTER — HOSPITAL ENCOUNTER (OUTPATIENT)
Age: 65
Discharge: HOME | End: 2020-09-25
Payer: COMMERCIAL

## 2020-09-23 ENCOUNTER — HOSPITAL ENCOUNTER (OUTPATIENT)
Dept: GENERAL RADIOLOGY | Age: 65
Discharge: HOME | End: 2020-09-25
Payer: COMMERCIAL

## 2020-09-23 PROCEDURE — 73560 X-RAY EXAM OF KNEE 1 OR 2: CPT

## 2020-10-22 NOTE — PROGRESS NOTES
1-2oz apple juice only    [] Bring inhalers day of surgery    [] Bring C-PAP/ Bi-Pap day of surgery    [] Bring urine specimen day of surgery    [x] Shower or bath with soap, lather and rinse well, AM of Surgery, no lotion, powders or creams to surgical site    [] Follow bowel prep as instructed per surgeon    [x] No tobacco products within 24 hours of surgery     [x] No alcohol or illegal drug use within 24 hours of surgery.     [x] Jewelry, body piercing's, eyeglasses, contact lenses and dentures are not permitted into surgery (bring cases)      [x] Please do not wear any nail polish, make up or hair products on the day of surgery    [x] You can expect a call the business day prior to procedure to notify you if your arrival time changes    [x] If you receive a survey after surgery we would greatly appreciate your comments    [] Parent/guardian of a minor must accompany their child and remain on the premises  the entire time they are under our care     [] Pediatric patients may bring favorite toy, blanket or comfort item with them    [] A caregiver or family member must remain with the patient during their stay if they are mentally handicapped, have dementia, disoriented or unable to use a call light or would be a safety concern if left unattended    [x] Please notify surgeon if you develop any illness between now and time of surgery (cold, cough, sore throat, fever, nausea, vomiting) or any signs of infections  including skin, wounds, and dental.    [x]  The Outpatient Pharmacy is available to fill your prescription here on your day of surgery, ask your preop nurse for details    [x] Other instructions    EDUCATIONAL MATERIALS PROVIDED:    [] PAT Preoperative Education Packet/Booklet     [] Medication List    [] Transfusion bracelet applied with instructions    [] Shower with soap, lather and rinse well, and use CHG wipes provided the evening before surgery as instructed    [] Incentive spirometer with instructions

## 2020-10-23 ENCOUNTER — HOSPITAL ENCOUNTER (OUTPATIENT)
Age: 65
Discharge: HOME | End: 2020-10-25
Payer: COMMERCIAL

## 2020-10-23 PROCEDURE — U0003 INFECTIOUS AGENT DETECTION BY NUCLEIC ACID (DNA OR RNA); SEVERE ACUTE RESPIRATORY SYNDROME CORONAVIRUS 2 (SARS-COV-2) (CORONAVIRUS DISEASE [COVID-19]), AMPLIFIED PROBE TECHNIQUE, MAKING USE OF HIGH THROUGHPUT TECHNOLOGIES AS DESCRIBED BY CMS-2020-01-R: HCPCS

## 2020-10-25 LAB
SARS-COV-2: NOT DETECTED
SOURCE: NORMAL

## 2020-10-28 ENCOUNTER — HOSPITAL ENCOUNTER (OUTPATIENT)
Age: 65
Setting detail: OUTPATIENT SURGERY
Discharge: HOME | End: 2020-10-28
Attending: OPHTHALMOLOGY | Admitting: OPHTHALMOLOGY
Payer: COMMERCIAL

## 2020-10-28 ENCOUNTER — ANESTHESIA (OUTPATIENT)
Dept: OPERATING ROOM | Age: 65
End: 2020-10-28
Payer: COMMERCIAL

## 2020-10-28 ENCOUNTER — ANESTHESIA EVENT (OUTPATIENT)
Dept: OPERATING ROOM | Age: 65
End: 2020-10-28
Payer: COMMERCIAL

## 2020-10-28 VITALS
DIASTOLIC BLOOD PRESSURE: 73 MMHG | WEIGHT: 220 LBS | HEIGHT: 66 IN | BODY MASS INDEX: 35.36 KG/M2 | TEMPERATURE: 98.1 F | HEART RATE: 72 BPM | RESPIRATION RATE: 18 BRPM | SYSTOLIC BLOOD PRESSURE: 146 MMHG | OXYGEN SATURATION: 97 %

## 2020-10-28 VITALS — OXYGEN SATURATION: 100 % | SYSTOLIC BLOOD PRESSURE: 170 MMHG | DIASTOLIC BLOOD PRESSURE: 80 MMHG

## 2020-10-28 LAB
INR BLD: 0.9
METER GLUCOSE: 256 MG/DL (ref 74–99)
PROTHROMBIN TIME: 10.4 SEC (ref 9.3–12.4)

## 2020-10-28 PROCEDURE — 6370000000 HC RX 637 (ALT 250 FOR IP): Performed by: OPHTHALMOLOGY

## 2020-10-28 PROCEDURE — 85610 PROTHROMBIN TIME: CPT

## 2020-10-28 PROCEDURE — 7100000011 HC PHASE II RECOVERY - ADDTL 15 MIN: Performed by: OPHTHALMOLOGY

## 2020-10-28 PROCEDURE — 36415 COLL VENOUS BLD VENIPUNCTURE: CPT

## 2020-10-28 PROCEDURE — 6360000002 HC RX W HCPCS: Performed by: OPHTHALMOLOGY

## 2020-10-28 PROCEDURE — 6370000000 HC RX 637 (ALT 250 FOR IP)

## 2020-10-28 PROCEDURE — 2580000003 HC RX 258: Performed by: OPHTHALMOLOGY

## 2020-10-28 PROCEDURE — 6360000002 HC RX W HCPCS

## 2020-10-28 PROCEDURE — 2709999900 HC NON-CHARGEABLE SUPPLY: Performed by: OPHTHALMOLOGY

## 2020-10-28 PROCEDURE — 7100000010 HC PHASE II RECOVERY - FIRST 15 MIN: Performed by: OPHTHALMOLOGY

## 2020-10-28 PROCEDURE — 3700000001 HC ADD 15 MINUTES (ANESTHESIA): Performed by: OPHTHALMOLOGY

## 2020-10-28 PROCEDURE — 82962 GLUCOSE BLOOD TEST: CPT

## 2020-10-28 PROCEDURE — 94664 DEMO&/EVAL PT USE INHALER: CPT

## 2020-10-28 PROCEDURE — 2500000003 HC RX 250 WO HCPCS: Performed by: NURSE ANESTHETIST, CERTIFIED REGISTERED

## 2020-10-28 PROCEDURE — 2580000003 HC RX 258

## 2020-10-28 PROCEDURE — 3700000000 HC ANESTHESIA ATTENDED CARE: Performed by: OPHTHALMOLOGY

## 2020-10-28 PROCEDURE — 3600000012 HC SURGERY LEVEL 2 ADDTL 15MIN: Performed by: OPHTHALMOLOGY

## 2020-10-28 PROCEDURE — 2500000003 HC RX 250 WO HCPCS: Performed by: OPHTHALMOLOGY

## 2020-10-28 PROCEDURE — 6360000002 HC RX W HCPCS: Performed by: ANESTHESIOLOGY

## 2020-10-28 PROCEDURE — 3600000002 HC SURGERY LEVEL 2 BASE: Performed by: OPHTHALMOLOGY

## 2020-10-28 RX ORDER — TETRACAINE HYDROCHLORIDE 5 MG/ML
1 SOLUTION OPHTHALMIC EVERY 10 MIN PRN
Status: COMPLETED | OUTPATIENT
Start: 2020-10-28 | End: 2020-10-28

## 2020-10-28 RX ORDER — ATROPINE SULFATE 10 MG/ML
SOLUTION/ DROPS OPHTHALMIC PRN
Status: DISCONTINUED | OUTPATIENT
Start: 2020-10-28 | End: 2020-10-28 | Stop reason: ALTCHOICE

## 2020-10-28 RX ORDER — SODIUM CHLORIDE 9 MG/ML
INJECTION, SOLUTION INTRAVENOUS CONTINUOUS
Status: DISCONTINUED | OUTPATIENT
Start: 2020-10-28 | End: 2020-10-28 | Stop reason: HOSPADM

## 2020-10-28 RX ORDER — LIDOCAINE HYDROCHLORIDE 20 MG/ML
INJECTION, SOLUTION INFILTRATION; PERINEURAL PRN
Status: DISCONTINUED | OUTPATIENT
Start: 2020-10-28 | End: 2020-10-28 | Stop reason: SDUPTHER

## 2020-10-28 RX ORDER — FENTANYL CITRATE 50 UG/ML
INJECTION, SOLUTION INTRAMUSCULAR; INTRAVENOUS PRN
Status: DISCONTINUED | OUTPATIENT
Start: 2020-10-28 | End: 2020-10-28 | Stop reason: SDUPTHER

## 2020-10-28 RX ORDER — CEFAZOLIN SODIUM 1 G/3ML
INJECTION, POWDER, FOR SOLUTION INTRAMUSCULAR; INTRAVENOUS PRN
Status: DISCONTINUED | OUTPATIENT
Start: 2020-10-28 | End: 2020-10-28 | Stop reason: ALTCHOICE

## 2020-10-28 RX ORDER — OFLOXACIN 3 MG/ML
1 SOLUTION/ DROPS OPHTHALMIC 4 TIMES DAILY
Status: DISCONTINUED | OUTPATIENT
Start: 2020-10-28 | End: 2020-10-28 | Stop reason: CLARIF

## 2020-10-28 RX ORDER — PROPOFOL 10 MG/ML
INJECTION, EMULSION INTRAVENOUS PRN
Status: DISCONTINUED | OUTPATIENT
Start: 2020-10-28 | End: 2020-10-28 | Stop reason: SDUPTHER

## 2020-10-28 RX ORDER — PREDNISOLONE ACETATE 10 MG/ML
1 SUSPENSION/ DROPS OPHTHALMIC
Status: DISCONTINUED | OUTPATIENT
Start: 2020-10-28 | End: 2020-10-28 | Stop reason: HOSPADM

## 2020-10-28 RX ORDER — SODIUM CHLORIDE 9 MG/ML
INJECTION, SOLUTION INTRAVENOUS CONTINUOUS PRN
Status: DISCONTINUED | OUTPATIENT
Start: 2020-10-28 | End: 2020-10-28 | Stop reason: SDUPTHER

## 2020-10-28 RX ORDER — CIPROFLOXACIN HYDROCHLORIDE 3.5 MG/ML
1 SOLUTION/ DROPS TOPICAL EVERY 5 MIN PRN
Status: COMPLETED | OUTPATIENT
Start: 2020-10-28 | End: 2020-10-28

## 2020-10-28 RX ORDER — CIPROFLOXACIN HYDROCHLORIDE 3.5 MG/ML
SOLUTION/ DROPS TOPICAL PRN
Status: DISCONTINUED | OUTPATIENT
Start: 2020-10-28 | End: 2020-10-28 | Stop reason: ALTCHOICE

## 2020-10-28 RX ORDER — DEXAMETHASONE SODIUM PHOSPHATE 10 MG/ML
INJECTION INTRAMUSCULAR; INTRAVENOUS PRN
Status: DISCONTINUED | OUTPATIENT
Start: 2020-10-28 | End: 2020-10-28 | Stop reason: ALTCHOICE

## 2020-10-28 RX ORDER — PILOCARPINE HYDROCHLORIDE 20 MG/ML
1 SOLUTION/ DROPS OPHTHALMIC EVERY 5 MIN PRN
Status: COMPLETED | OUTPATIENT
Start: 2020-10-28 | End: 2020-10-28

## 2020-10-28 RX ORDER — ALBUTEROL SULFATE 2.5 MG/3ML
2.5 SOLUTION RESPIRATORY (INHALATION) ONCE
Status: COMPLETED | OUTPATIENT
Start: 2020-10-28 | End: 2020-10-28

## 2020-10-28 RX ADMIN — ALBUTEROL SULFATE 2.5 MG: 2.5 SOLUTION RESPIRATORY (INHALATION) at 14:02

## 2020-10-28 RX ADMIN — PILOCARPINE HYDROCHLORIDE 1 DROP: 20 SOLUTION/ DROPS OPHTHALMIC at 13:55

## 2020-10-28 RX ADMIN — PILOCARPINE HYDROCHLORIDE 1 DROP: 20 SOLUTION/ DROPS OPHTHALMIC at 13:45

## 2020-10-28 RX ADMIN — PROPOFOL 50 MG: 10 INJECTION, EMULSION INTRAVENOUS at 14:31

## 2020-10-28 RX ADMIN — CIPROFLOXACIN 1 DROP: 3 SOLUTION OPHTHALMIC at 13:50

## 2020-10-28 RX ADMIN — FENTANYL CITRATE 50 MCG: 50 INJECTION, SOLUTION INTRAMUSCULAR; INTRAVENOUS at 14:31

## 2020-10-28 RX ADMIN — CIPROFLOXACIN 1 DROP: 3 SOLUTION OPHTHALMIC at 13:55

## 2020-10-28 RX ADMIN — TETRACAINE HYDROCHLORIDE 1 DROP: 5 SOLUTION OPHTHALMIC at 13:55

## 2020-10-28 RX ADMIN — SODIUM CHLORIDE: 9 INJECTION, SOLUTION INTRAVENOUS at 14:28

## 2020-10-28 RX ADMIN — PILOCARPINE HYDROCHLORIDE 1 DROP: 20 SOLUTION/ DROPS OPHTHALMIC at 13:50

## 2020-10-28 RX ADMIN — SODIUM CHLORIDE: 9 INJECTION, SOLUTION INTRAVENOUS at 13:44

## 2020-10-28 RX ADMIN — TETRACAINE HYDROCHLORIDE 1 DROP: 5 SOLUTION OPHTHALMIC at 13:45

## 2020-10-28 RX ADMIN — LIDOCAINE HYDROCHLORIDE 100 MG: 20 INJECTION, SOLUTION INFILTRATION; PERINEURAL at 14:31

## 2020-10-28 RX ADMIN — CIPROFLOXACIN 1 DROP: 3 SOLUTION OPHTHALMIC at 13:45

## 2020-10-28 RX ADMIN — TETRACAINE HYDROCHLORIDE 1 DROP: 5 SOLUTION OPHTHALMIC at 14:05

## 2020-10-28 ASSESSMENT — PAIN - FUNCTIONAL ASSESSMENT: PAIN_FUNCTIONAL_ASSESSMENT: 0-10

## 2020-10-28 ASSESSMENT — LIFESTYLE VARIABLES: SMOKING_STATUS: 0

## 2020-10-28 ASSESSMENT — PULMONARY FUNCTION TESTS
PIF_VALUE: 1
PIF_VALUE: 1
PIF_VALUE: 0

## 2020-10-28 ASSESSMENT — PAIN SCALES - GENERAL: PAINLEVEL_OUTOF10: 0

## 2020-10-28 NOTE — ANESTHESIA PRE PROCEDURE
Historical Provider, MD   Insulin Glargine (LANTUS SC) Inject 35 Units into the skin nightly. Yes Historical Provider, MD   ALPRAZolam Trenia Fury) 1 MG tablet Take 1 mg by mouth nightly as needed. Yes Historical Provider, MD   metoprolol (LOPRESSOR) 50 MG tablet Take 50 mg by mouth 2 times daily. Yes Historical Provider, MD   albuterol (PROVENTIL HFA;VENTOLIN HFA) 108 (90 BASE) MCG/ACT inhaler Inhale 2 puffs into the lungs every 6 hours as needed. Yes Historical Provider, MD   VERAPAMIL HCL Take 120 mg by mouth three times daily.    Yes Historical Provider, MD   losartan (COZAAR) 25 MG tablet Take 50 mg by mouth daily    Yes Historical Provider, MD       Current medications:    Current Facility-Administered Medications   Medication Dose Route Frequency Provider Last Rate Last Dose    0.9 % sodium chloride infusion   Intravenous Continuous Misti Chery MD        tetracaine (TETRAVISC) 0.5 % ophthalmic solution 1 drop  1 drop Left Eye Q10 Min PRN Misti Chery MD        pilocarpine (PILOCAR) 2 % ophthalmic solution 1 drop  1 drop Left Eye Q5 Min PRN Misti Chery MD        mitoMYcin 0.5 mg/ml chemo IO syringe  0.5 mg Intraocular Once Misti Chery MD        prednisoLONE acetate (PRED FORTE) 1 % ophthalmic suspension 1 drop  1 drop Left Eye 6 times per day Misti Chery MD        ciprofloxacin (CILOXAN) 0.3 % ophthalmic solution 1 drop  1 drop Left Eye Q5 Min PRN Misti Chery MD           Allergies:  No Known Allergies    Problem List:    Patient Active Problem List   Diagnosis Code    History of MI (myocardial infarction) I25.2    Non morbid obesity E66.9    Controlled type 2 diabetes mellitus without complication, without long-term current use of insulin ([facility] [address] E11.9    Essential hypertension I10    Heart palpitations R00.2       Past Medical History:        Diagnosis Date    Arthritis     Asthma     controlled     CAD (coronary artery disease)     Dr. Gisela Varghese Diabetes mellitus ([facility] [address]     Fall     8/2020     Falls frequently     Hyperlipidemia     Hypertension     MI, old 2002    Seasonal allergies        Past Surgical History:        Procedure Laterality Date    APPENDECTOMY      BREAST SURGERY      benign calcium deposit    CARDIAC CATHETERIZATION      x2  negative- no stents     CATARACT REMOVAL WITH IMPLANT Right 11 18 2013    CATARACT REMOVAL WITH IMPLANT Left 1/29/2014    phacoemulsification cataract extraction with posterior chamber intraocular mens implant lef teye    CHOLECYSTECTOMY      COLONOSCOPY      ENDOSCOPY, COLON, DIAGNOSTIC      TUBAL LIGATION         Social History:    Social History     Tobacco Use    Smoking status: Never Smoker    Smokeless tobacco: Never Used   Substance Use Topics    Alcohol use: No     Comment: pop weekly                                Counseling given: Not Answered      Vital Signs (Current):   Vitals:    10/22/20 1607   Weight: 220 lb (99.8 kg)   Height: 5' 6\" (1.676 m)                                              BP Readings from Last 3 Encounters:   07/06/20 134/80   04/29/20 135/88   08/27/19 102/62       NPO Status:                                                                                 BMI:   Wt Readings from Last 3 Encounters:   10/22/20 220 lb (99.8 kg)   07/06/20 239 lb (108.4 kg)   04/28/20 233 lb (105.7 kg)     Body mass index is 35.51 kg/m².     CBC:   Lab Results   Component Value Date    WBC 7.8 07/26/2019    RBC 4.67 07/26/2019    HGB 12.6 07/26/2019    HCT 38.9 07/26/2019    MCV 83.3 07/26/2019    RDW 12.6 07/26/2019     07/26/2019       CMP:   Lab Results   Component Value Date     07/26/2019    K 4.2 07/26/2019    CL 97 07/26/2019    CO2 27 07/26/2019    BUN 15 07/26/2019    CREATININE 0.9 07/26/2019    GFRAA >60 07/26/2019    LABGLOM >60 07/26/2019    GLUCOSE 94 07/26/2019    GLUCOSE 320 03/31/2012    PROT 7.0 03/02/2019    CALCIUM 9.1 07/26/2019    BILITOT 0.4 03/02/2019 ALKPHOS 142 03/02/2019    AST 20 03/02/2019    ALT 20 03/02/2019       POC Tests: No results for input(s): POCGLU, POCNA, POCK, POCCL, POCBUN, POCHEMO, POCHCT in the last 72 hours. Coags: No results found for: PROTIME, INR, APTT    HCG (If Applicable): No results found for: PREGTESTUR, PREGSERUM, HCG, HCGQUANT     ABGs: No results found for: PHART, PO2ART, GEX9IDS, CMB5KOZ, BEART, S4BMHTJD     Type & Screen (If Applicable):  No results found for: LABABO, LABRH    Drug/Infectious Status (If Applicable):  No results found for: HIV, HEPCAB    COVID-19 Screening (If Applicable):   Lab Results   Component Value Date    COVID19 Not Detected 10/23/2020         Anesthesia Evaluation  Patient summary reviewed and Nursing notes reviewed no history of anesthetic complications:   Airway: Mallampati: II  TM distance: >3 FB   Neck ROM: full  Mouth opening: > = 3 FB Dental:    (+) upper dentures      Pulmonary: breath sounds clear to auscultation  (+) asthma:     (-) not a current smoker                           Cardiovascular:  Exercise tolerance: good (>4 METS),   (+) hypertension:, past MI:, hyperlipidemia        Rhythm: regular  Rate: normal      Cleared by cardiology     Beta Blocker:  Dose within 24 Hrs         Neuro/Psych:                ROS comment: Chronic back pain GI/Hepatic/Renal:   (+) morbid obesity          Endo/Other:    (+) DiabetesType II DM, , : arthritis: OA., .                 Abdominal:           Vascular:                                      Anesthesia Plan      MAC     ASA 3       Induction: intravenous. Anesthetic plan and risks discussed with patient and spouse.                       Pierre Stahl MD   10/28/2020

## 2020-10-28 NOTE — ANESTHESIA POSTPROCEDURE EVALUATION
Department of Anesthesiology  Postprocedure Note    Patient: Bob Wang  MRN: [id number]  YOB: 1955  Date of evaluation: 10/28/2020  Time:  7:32 PM     Procedure Summary     Date:  10/28/20 Room / Location:  [facility] OR 03 / [facility]    Anesthesia Start:  9379 Anesthesia Stop:  3515    Procedure:  TRABECULECTOMY WITH MITOMYCIN C LEFT EYE (Left Eye) Diagnosis:  (GLAUCOMA)    Surgeon:  Grant Roper MD Responsible Provider:  Cee Ayala MD    Anesthesia Type:  MAC ASA Status:  3          Anesthesia Type: MAC    Ramón Phase I: Ramón Score: 10    Ramón Phase II: Ramón Score: 10    Last vitals: Reviewed and per EMR flowsheets.        Anesthesia Post Evaluation    Patient location during evaluation: PACU  Patient participation: complete - patient participated  Level of consciousness: awake and alert  Airway patency: patent  Nausea & Vomiting: no vomiting and no nausea  Complications: no  Cardiovascular status: blood pressure returned to baseline  Respiratory status: acceptable  Hydration status: euvolemic

## 2020-10-28 NOTE — BRIEF OP NOTE
Brief Postoperative Note      Patient: Klever Cannon  YOB: 1955  MRN: [id number]    Date of Procedure: 10/28/2020    Pre-Op Diagnosis: GLAUCOMA    Post-Op Diagnosis: Same       Procedure(s):  TRABECULECTOMY WITH MITOMYCIN C LEFT EYE    Surgeon(s):  Leslie Cardenas MD    Assistant:  * No surgical staff found *    Anesthesia: Monitor Anesthesia Care    Estimated Blood Loss (mL): Minimal    Complications: None    Specimens:   * No specimens in log *    Implants:  * No implants in log *      Drains: * No LDAs found *    Findings: none    Electronically signed by Leslie Cardenas MD on 10/28/2020 at 3:30 PM

## 2020-10-29 NOTE — OP NOTE
[address] [address]                                OPERATIVE REPORT    PATIENT NAME: Darwin Hinkle                   :        1955  MED REC NO:   [id number]                            ROOM:  ACCOUNT NO:   [de-identified]                           ADMIT DATE: 10/28/2020  PROVIDER:     Melvi Lo MD    DATE OF PROCEDURE:  10/28/2020    PREOPERATIVE DIAGNOSIS:  Advanced open angle glaucoma, left eye. POSTOPERATIVE DIAGNOSIS:  Advanced open angle glaucoma, left eye. PROCEDURE PERFORMED:  Trabeculectomy and mitomycin-C, left eye. ANESTHESIA:  Local MAC. COMPLICATIONS:  None. SURGEON:  Melvi Lo MD.    INDICATION FOR PROCEDURE:  The patient is a very pleasant 61-year-old  female noting decline in vision in her left eye and diagnosed with very  elevated intraocular pressures, poorly controlled on medications. With  the risks, benefits, and alternatives discussed with her, she wished to  proceed with the above procedure. DESCRIPTION OF OPERATION:  The patient was brought to the operating room  and placed in the supine position on the operating table. She was  administered IV sedation and while under it, she was given a 4 mL  retrobulbar nerve block to the left eye consisting of a 50:50 mixture of  0.75% Marcaine and 2% lidocaine to which Wydase had been added. She was prepped and draped in the usual sterile fashion for intraocular  surgery. Attention was directed to the left eye, where lid speculum was  placed and operating microscope was brought into view. A 6-0 Vicryl traction suture was passed through peripheral superior  clear cornea and secured to rotate the globe inferiorly. Conjunctiva  and Tenon's were opened up at 10 mm posterior to the limbus for a chord  length of 8 to 10 mm. The area was dissected forward to the limbal area.   Hemostasis was  achieved with a wet-field cautery and a 3

## 2020-11-14 ENCOUNTER — HOSPITAL ENCOUNTER (OUTPATIENT)
Dept: GENERAL RADIOLOGY | Age: 65
Discharge: HOME | End: 2020-11-16
Payer: COMMERCIAL

## 2020-11-14 ENCOUNTER — HOSPITAL ENCOUNTER (OUTPATIENT)
Age: 65
Discharge: HOME | End: 2020-11-16
Payer: COMMERCIAL

## 2020-11-14 PROCEDURE — 71046 X-RAY EXAM CHEST 2 VIEWS: CPT

## 2023-04-26 ENCOUNTER — HOSPITAL ENCOUNTER (OUTPATIENT)
Age: 68
Discharge: HOME | End: 2023-04-26
Payer: MEDICARE

## 2023-04-26 LAB
ALBUMIN SERPL-MCNC: 4.4 G/DL (ref 3.5–5.2)
ALP SERPL-CCNC: 151 U/L (ref 35–104)
ALT SERPL-CCNC: 25 U/L (ref 0–32)
ANION GAP SERPL CALCULATED.3IONS-SCNC: 14 MMOL/L (ref 7–16)
AST SERPL-CCNC: 19 U/L (ref 0–31)
BASOPHILS # BLD: 0.03 E9/L (ref 0–0.2)
BASOPHILS NFR BLD: 0.5 % (ref 0–2)
BILIRUB SERPL-MCNC: 0.7 MG/DL (ref 0–1.2)
BILIRUB UR QL STRIP: NEGATIVE
BUN SERPL-MCNC: 11 MG/DL (ref 6–23)
CALCIUM SERPL-MCNC: 9.5 MG/DL (ref 8.6–10.2)
CHLORIDE SERPL-SCNC: 95 MMOL/L (ref 98–107)
CHOLESTEROL, TOTAL: 160 MG/DL (ref 0–199)
CLARITY UR: CLEAR
CO2 SERPL-SCNC: 28 MMOL/L (ref 22–29)
COLOR UR: YELLOW
CREAT SERPL-MCNC: 0.9 MG/DL (ref 0.5–1)
EOSINOPHIL # BLD: 0.17 E9/L (ref 0.05–0.5)
EOSINOPHIL NFR BLD: 2.6 % (ref 0–6)
ERYTHROCYTE [DISTWIDTH] IN BLOOD BY AUTOMATED COUNT: 13.3 FL (ref 11.5–15)
GLUCOSE SERPL-MCNC: 311 MG/DL (ref 74–99)
GLUCOSE UR STRIP-MCNC: 500 MG/DL
HBA1C MFR BLD: 10 % (ref 4–5.6)
HCT VFR BLD AUTO: 41.8 % (ref 34–48)
HDLC SERPL-MCNC: 58 MG/DL
HGB BLD-MCNC: 13.5 G/DL (ref 11.5–15.5)
HGB UR QL STRIP: NEGATIVE
IMM GRANULOCYTES # BLD: 0.02 E9/L
IMM GRANULOCYTES NFR BLD: 0.3 % (ref 0–5)
KETONES UR STRIP-MCNC: NEGATIVE MG/DL
LDLC SERPL CALC-MCNC: 86 MG/DL (ref 0–99)
LEUKOCYTE ESTERASE UR QL STRIP: NEGATIVE
LYMPHOCYTES # BLD: 2.39 E9/L (ref 1.5–4)
LYMPHOCYTES NFR BLD: 37.2 % (ref 20–42)
MCH RBC QN AUTO: 26.8 PG (ref 26–35)
MCHC RBC AUTO-ENTMCNC: 32.3 % (ref 32–34.5)
MCV RBC AUTO: 83.1 FL (ref 80–99.9)
MICROALBUMIN UR-MCNC: 13.2 MG/L
MONOCYTES # BLD: 0.49 E9/L (ref 0.1–0.95)
MONOCYTES NFR BLD: 7.6 % (ref 2–12)
NEUTROPHILS # BLD: 3.32 E9/L (ref 1.8–7.3)
NEUTS SEG NFR BLD: 51.8 % (ref 43–80)
NITRITE UR QL STRIP: NEGATIVE
PH UR STRIP: 6 [PH] (ref 5–9)
PLATELET # BLD AUTO: 252 E9/L (ref 130–450)
PMV BLD AUTO: 10.4 FL (ref 7–12)
POTASSIUM SERPL-SCNC: 4.3 MMOL/L (ref 3.5–5)
PROT SERPL-MCNC: 7.6 G/DL (ref 6.4–8.3)
PROT UR STRIP-MCNC: NEGATIVE MG/DL
RBC # BLD AUTO: 5.03 E12/L (ref 3.5–5.5)
SODIUM SERPL-SCNC: 137 MMOL/L (ref 132–146)
SP GR UR STRIP: 1.02 (ref 1–1.03)
TRIGL SERPL-MCNC: 80 MG/DL (ref 0–149)
TSH SERPL-MCNC: 2.29 UIU/ML (ref 0.27–4.2)
UROBILINOGEN UR STRIP-ACNC: 0.2 E.U./DL
VITAMIN D 25-HYDROXY: 35 NG/ML (ref 30–100)
VLDLC SERPL CALC-MCNC: 16 MG/DL
WBC # BLD: 6.4 E9/L (ref 4.5–11.5)

## 2023-04-26 PROCEDURE — 36415 COLL VENOUS BLD VENIPUNCTURE: CPT

## 2023-04-26 PROCEDURE — 81003 URINALYSIS AUTO W/O SCOPE: CPT

## 2023-04-26 PROCEDURE — 82044 UR ALBUMIN SEMIQUANTITATIVE: CPT

## 2023-04-26 PROCEDURE — 84443 ASSAY THYROID STIM HORMONE: CPT

## 2023-04-26 PROCEDURE — 80061 LIPID PANEL: CPT

## 2023-04-26 PROCEDURE — 83036 HEMOGLOBIN GLYCOSYLATED A1C: CPT

## 2023-04-26 PROCEDURE — 82306 VITAMIN D 25 HYDROXY: CPT

## 2023-04-26 PROCEDURE — 80053 COMPREHEN METABOLIC PANEL: CPT

## 2023-04-26 PROCEDURE — 85025 COMPLETE CBC W/AUTO DIFF WBC: CPT

## 2023-12-15 ENCOUNTER — HOSPITAL ENCOUNTER (OUTPATIENT)
Age: 68
Discharge: HOME | End: 2023-12-15
Payer: MEDICARE

## 2023-12-15 LAB
ALBUMIN SERPL-MCNC: 4.3 G/DL (ref 3.5–5.2)
ALP SERPL-CCNC: 139 U/L (ref 35–104)
ALT SERPL-CCNC: 19 U/L (ref 0–32)
ANION GAP SERPL CALCULATED.3IONS-SCNC: 11 MMOL/L (ref 7–16)
AST SERPL-CCNC: 18 U/L (ref 0–31)
BASOPHILS # BLD: 0.02 K/UL (ref 0–0.2)
BASOPHILS NFR BLD: 0 % (ref 0–2)
BILIRUB SERPL-MCNC: 0.5 MG/DL (ref 0–1.2)
BUN SERPL-MCNC: 13 MG/DL (ref 6–23)
CALCIUM SERPL-MCNC: 9.3 MG/DL (ref 8.6–10.2)
CHLORIDE SERPL-SCNC: 97 MMOL/L (ref 98–107)
CHOLEST SERPL-MCNC: 154 MG/DL
CO2 SERPL-SCNC: 28 MMOL/L (ref 22–29)
CREAT SERPL-MCNC: 0.8 MG/DL (ref 0.5–1)
EOSINOPHIL # BLD: 0.09 K/UL (ref 0.05–0.5)
EOSINOPHILS RELATIVE PERCENT: 2 % (ref 0–6)
ERYTHROCYTE [DISTWIDTH] IN BLOOD BY AUTOMATED COUNT: 12.5 % (ref 12–16)
GFR SERPL CREATININE-BSD FRML MDRD: >60 ML/MIN/1.73M2
GLUCOSE SERPL-MCNC: 204 MG/DL (ref 74–99)
HBA1C MFR BLD: 9.9 % (ref 4–5.6)
HCT VFR BLD AUTO: 40.9 % (ref 34–48)
HDLC SERPL-MCNC: 52 MG/DL
HGB BLD-MCNC: 13 G/DL (ref 11.5–15.5)
IMM GRANULOCYTES # BLD AUTO: <0.03 K/UL (ref 0–0.58)
IMM GRANULOCYTES NFR BLD: 0 % (ref 0–5)
LDLC SERPL CALC-MCNC: 83 MG/DL
LYMPHOCYTES NFR BLD: 2.38 K/UL (ref 1.5–4)
LYMPHOCYTES RELATIVE PERCENT: 50 % (ref 20–42)
MCH RBC QN AUTO: 26.5 PG (ref 26–35)
MCHC RBC AUTO-ENTMCNC: 31.8 G/DL (ref 32–34.5)
MCV RBC AUTO: 83.5 FL (ref 80–99.9)
MICROALBUMIN UR-MCNC: 13 MG/L (ref 0–19)
MONOCYTES NFR BLD: 0.25 K/UL (ref 0.1–0.95)
MONOCYTES NFR BLD: 5 % (ref 2–12)
NEUTROPHILS NFR BLD: 42 % (ref 43–80)
NEUTS SEG NFR BLD: 1.99 K/UL (ref 1.8–7.3)
PLATELET # BLD AUTO: 210 K/UL (ref 130–450)
PMV BLD AUTO: 10.4 FL (ref 7–12)
POTASSIUM SERPL-SCNC: 3.4 MMOL/L (ref 3.5–5)
PROT SERPL-MCNC: 7.5 G/DL (ref 6.4–8.3)
RBC # BLD AUTO: 4.9 M/UL (ref 3.5–5.5)
SODIUM SERPL-SCNC: 136 MMOL/L (ref 132–146)
TRIGL SERPL-MCNC: 97 MG/DL
TSH SERPL DL<=0.05 MIU/L-ACNC: 1.46 UIU/ML (ref 0.27–4.2)
VIT B12 SERPL-MCNC: 563 PG/ML (ref 211–946)
VLDLC SERPL CALC-MCNC: 19 MG/DL
WBC OTHER # BLD: 4.8 K/UL (ref 4.5–11.5)

## 2023-12-15 PROCEDURE — 82607 VITAMIN B-12: CPT

## 2023-12-15 PROCEDURE — 83036 HEMOGLOBIN GLYCOSYLATED A1C: CPT

## 2023-12-15 PROCEDURE — 80061 LIPID PANEL: CPT

## 2023-12-15 PROCEDURE — 82043 UR ALBUMIN QUANTITATIVE: CPT

## 2023-12-15 PROCEDURE — 84443 ASSAY THYROID STIM HORMONE: CPT

## 2023-12-15 PROCEDURE — 36415 COLL VENOUS BLD VENIPUNCTURE: CPT

## 2023-12-15 PROCEDURE — 85025 COMPLETE CBC W/AUTO DIFF WBC: CPT

## 2023-12-15 PROCEDURE — 80053 COMPREHEN METABOLIC PANEL: CPT

## 2024-03-03 ENCOUNTER — HOSPITAL ENCOUNTER (OUTPATIENT)
Dept: MRI IMAGING | Age: 69
Discharge: HOME | End: 2024-03-05
Attending: INTERNAL MEDICINE
Payer: MEDICARE

## 2024-03-03 DIAGNOSIS — I25.10 DISEASE OF CARDIOVASCULAR SYSTEM: ICD-10-CM

## 2024-03-03 DIAGNOSIS — I10 ESSENTIAL HYPERTENSION, MALIGNANT: ICD-10-CM

## 2024-03-03 DIAGNOSIS — M48.062 PSEUDOCLAUDICATION SYNDROME: ICD-10-CM

## 2024-03-03 DIAGNOSIS — E11.40 DIABETIC PSEUDOTABES (HCC): ICD-10-CM

## 2024-03-03 DIAGNOSIS — E11.65 INADEQUATELY CONTROLLED DIABETES MELLITUS (HCC): ICD-10-CM

## 2024-03-03 PROCEDURE — 72148 MRI LUMBAR SPINE W/O DYE: CPT

## 2024-08-15 ENCOUNTER — TELEPHONE (OUTPATIENT)
Dept: NEUROSURGERY | Facility: HOSPITAL | Age: 69
End: 2024-08-15
Payer: MEDICARE

## 2024-08-19 NOTE — PROGRESS NOTES
It was a pleasure to see Ms. Mayo at the [facility]surgery Sp[facility] at [facility].     She is a really nice 69 y.o. female  who presents to us with complaints LOW BACK PAIN radiation to LEFT LEG  AND right leg that started about   2 years  ago, and have been gradually worsening since that time.  The symptoms started after a fall and worsened after another fall 6 months ago.    The pain is 8 /10. The pain is described as aching and occurs all day.  Symptoms are exacerbated by standing. Factors which relieve the pain include acetaminophen      Numbness and/or tingling - YES    Weakness : YES    Bladder/Bowel symptoms - YES    The patient has tried medications (eg: gabapentin, NSAIDS and narcotics ) : Yes  PT : Yes    Date: 2022  Pain Management with ESIs/selective nerve blocks  - NO    she is a DIABETIC    History of Depression : NO    PRIOR SPINE SURGERY: NO    Denies use of Aspirin, Coumadin, or Plavix or any other anticoagulants     NARCOTICS for pain : NO    Part of this patient’s history is from personal review of the patient’s previous charts.      No past medical history on file.      No current outpatient medications on file.      Review of Systems :   Constitutional: No fever or chills  Respiratory: No shortness of breath or wheezing  Musculoskeletal: see HPI above   Neurologic: See HPI above        EXAM:   There were no vitals filed for this visit.    NEURO: Neurologically patient is awake alert and oriented X 3    No obvious cranial nerve deficit.  Tone increased bilateral lower extremities.  Motor exam shows presence of significant weakness bilateral lower extremities.  BUE D/B/T/5, HG/IO/WE 4+  RLE HF3, KE 3, DF/PF 4-  LLE HF1-2, KE3, DF/PF 4-  Deep tendon flexes are 1+.  There is about a 50% sensory loss below the waist level bilaterally.  Patient could not stand or walk and came to see me on a wheelchair secondary to the severe weakness that she has.    IMAGING:   MRI of the lumbar spine  that was done on 3/3/2024 was reviewed personally and shows presence of severe lumbar spinal stenosis at L2-3 level resulting in significant nerve root compression.  There is evidence of a Schmorl's node at L3 superior endplate.  Sagittal imaging shows a very severe spinal stenosis at T10-11 level causing severe spinal cord compression intramedullary signal change in the spinal cord at that level.  There is evidence of severe foraminal stenosis also at that level.  There is absence of dedicated thoracic spine MRI.        ASSESSMENT AND PLAN:  Ms. Mayo is a really nice 69 y.o. female who presents to me with inability to walk for several months with urinary incontinence and occasional stool incontinence.  She has a sensory level.  She has numbness and tingling in bilateral lower extremities.  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    I have reviewed imaging and diagnosis with the patient, discussed the natural history of the disease and both non-operative and operative treatments available and rationale vs risks for both.     The patient's clinical symptoms correlates well with the radiological findings.    She has been having significant functional impairment with decreased ability to perform her ADL secondary to Severe weakness and numbness involving bilateral lower extremities.   She also has pain that has been debilitating on a daily basis with a score of more than 5 on scale of 0 - 10.    The patient is not a candidate for non-operative trial or continued non operative treatment as she has been having   i.Severe symptoms forcing bed rest and she is at significant risk for a fall  ii.Significant  functionally limiting motor weakness or numbness 1 week with the MRI as like urgent or something she presents routine  iii.Progressive neurologic deficit  Iv: Presence of severe spinal cord compression.    She has had history of ischemic cardiac disease and she  feels that her cardiologist told her that she was too sick to undergo any surgery.  She only takes aspirin.  I have recommended her to see her cardiologist and get clearance before any surgical intervention given the medical necessity of surgery sees essentially wheelchair-bound secondary to the weakness that she has experienced.    Considering the degree of disability secondary to severe spinal stenosis in the thoracic spine resulting in signs or symptoms of thoracic myelopathy along with severe lumbar stenosis causing neurogenic claudication, surgery at this point is indicated and is medically necessary to improve the quality of life and day to day functioning.  The patient also already has bothered her symptoms and severe weakness in the lower extremities.  Supposedly most of her symptoms are going on for several months so even though I believe her surgery is medically necessary I would not recommend any emergent surgery in a day or 2 and we will schedule her in the next 2 to 3 weeks or so pending cardiology clearance and also a dedicated thoracic spine MRI that was not performed even though she does have severe T10 -11 level stenosis.  The MRI thoracic spine is necessary to delineate the anatomy of the thoracic stenosis that she has.    She does have diabetes and her most recent HbA1c was 8 which is the upper limit of normal I would consider her for a surgery that is medically necessary given the presence of spinal cord compression.    I recommended T10-T11 laminectomy facetectomy with T10 -11 posterior nonsegmental instrumented fusion using local bone autograft and DBM along with L2-3 minimally invasive laminectomy with the use of operative microscope.    I have explained the surgical procedure in detail with expected duration and extent of recovery along risks of surgery that include, but is not limited to bleeding, infection, blood vessel injury or damage,loss of sensation, loss of bladder, bowel or sexual  function, SPINAL CORD INJURY /damage ( up to 10 % ) resulting in weakness/paralysis, malunion, nonunion, CSF leak, brachial plexus injury, peripheral vision blindness, injury to the abdominal contents, failure of implants/fusion, failure to relieve symptoms, recurrent disease, adjacent segment disease, need to reoperate for any reason and general anesthesia reaction such as stroke, coma, heart attack, delirium, confusion, death as well as worsening of preexisted medical conditions and any other unforeseen complication related to unrelated to the spinal procedure per se.    A Shared decision was made to proceed with surgery after involving the patient and her family in the treatment decision-making process.  They clearly expressed understanding of possible risks and benefits of surgery and the realistic expectations of surgery along with the fact that the goal of the surgery is to improve the  overall functioning and quality of life by improvement of the current level of function,  possible improvement and/or prevent progression of preexisting neurological deficits and not necessarily 100 % pain relief. There is no guarantee that the prexisiting deficits or symptoms will improve definitely after surgery. Also discussed with the patient a small but possible chances of worsening of her her symptoms or development of new symptoms despite a successful surgery that may be worse than what she has now. The option of continued non-operative management was very clearly discussed as well with its associated risks and benefits and the patient was clearly provided that option.     It was a pleasure to participate in Ms. Mayo clinical care. All questions were answered to her satisfaction and she explained understanding of the further treatment plan.       Ajith Lopez MD, [facility]   of Neurological Surgery  [facility] [employer]  Attending Surgeon  Director - Minimally Invasive  Spine Surgery  [place], [place]      Some of this note was completed using Dragon voice recognition technology and sometimes the software misinterprets words. This may include unintended errors with respect to translation of words, typographical errors or grammar errors which may not have been identified prior to finalization of the chart note. Please take this into account when reading this note

## 2024-08-20 ENCOUNTER — HOSPITAL ENCOUNTER (OUTPATIENT)
Dept: RADIOLOGY | Facility: CLINIC | Age: 69
Discharge: HOME | End: 2024-08-20
Payer: MEDICARE

## 2024-08-20 ENCOUNTER — OFFICE VISIT (OUTPATIENT)
Dept: NEUROSURGERY | Facility: CLINIC | Age: 69
End: 2024-08-20
Payer: MEDICARE

## 2024-08-20 VITALS
TEMPERATURE: 96.3 F | DIASTOLIC BLOOD PRESSURE: 82 MMHG | BODY MASS INDEX: 35.36 KG/M2 | HEIGHT: 66 IN | SYSTOLIC BLOOD PRESSURE: 143 MMHG | WEIGHT: 220 LBS | HEART RATE: 77 BPM

## 2024-08-20 DIAGNOSIS — M54.16 LUMBAR RADICULOPATHY: ICD-10-CM

## 2024-08-20 DIAGNOSIS — M47.10 SPINAL CORD COMPRESSION DUE TO DEGENERATIVE DISORDER OF SPINAL COLUMN: ICD-10-CM

## 2024-08-20 DIAGNOSIS — M48.062 LUMBAR STENOSIS WITH NEUROGENIC CLAUDICATION: ICD-10-CM

## 2024-08-20 DIAGNOSIS — M51.04 THORACIC DISC DISEASE WITH MYELOPATHY: Primary | ICD-10-CM

## 2024-08-20 PROCEDURE — 1159F MED LIST DOCD IN RCRD: CPT | Performed by: NEUROLOGICAL SURGERY

## 2024-08-20 PROCEDURE — 1125F AMNT PAIN NOTED PAIN PRSNT: CPT | Performed by: NEUROLOGICAL SURGERY

## 2024-08-20 PROCEDURE — 1036F TOBACCO NON-USER: CPT | Performed by: NEUROLOGICAL SURGERY

## 2024-08-20 PROCEDURE — 72082 X-RAY EXAM ENTIRE SPI 2/3 VW: CPT | Performed by: STUDENT IN AN ORGANIZED HEALTH CARE EDUCATION/TRAINING PROGRAM

## 2024-08-20 PROCEDURE — 72082 X-RAY EXAM ENTIRE SPI 2/3 VW: CPT

## 2024-08-20 PROCEDURE — 99215 OFFICE O/P EST HI 40 MIN: CPT | Mod: GC | Performed by: NEUROLOGICAL SURGERY

## 2024-08-20 PROCEDURE — 99205 OFFICE O/P NEW HI 60 MIN: CPT | Performed by: NEUROLOGICAL SURGERY

## 2024-08-20 ASSESSMENT — PAIN SCALES - GENERAL: PAINLEVEL: 8

## 2024-08-21 ENCOUNTER — HOSPITAL ENCOUNTER (OUTPATIENT)
Facility: HOSPITAL | Age: 69
Setting detail: SURGERY ADMIT
End: 2024-08-21
Attending: NEUROLOGICAL SURGERY | Admitting: NEUROLOGICAL SURGERY
Payer: MEDICARE

## 2024-08-21 DIAGNOSIS — M51.04 THORACIC DISC DISEASE WITH MYELOPATHY: ICD-10-CM

## 2024-08-21 DIAGNOSIS — M47.10 SPINAL CORD COMPRESSION DUE TO DEGENERATIVE DISORDER OF SPINAL COLUMN: ICD-10-CM

## 2024-08-21 DIAGNOSIS — M54.16 LUMBAR RADICULOPATHY: ICD-10-CM

## 2024-08-21 DIAGNOSIS — M48.062 LUMBAR STENOSIS WITH NEUROGENIC CLAUDICATION: ICD-10-CM

## 2024-08-23 ENCOUNTER — HOSPITAL ENCOUNTER (OUTPATIENT)
Dept: RADIOLOGY | Facility: HOSPITAL | Age: 69
Discharge: HOME | End: 2024-08-23
Payer: MEDICARE

## 2024-08-23 DIAGNOSIS — M54.16 LUMBAR RADICULOPATHY: ICD-10-CM

## 2024-08-23 PROCEDURE — 72146 MRI CHEST SPINE W/O DYE: CPT

## 2024-09-04 ENCOUNTER — CLINICAL SUPPORT (OUTPATIENT)
Dept: PREADMISSION TESTING | Facility: HOSPITAL | Age: 69
End: 2024-09-04
Payer: MEDICARE

## 2024-09-04 RX ORDER — HYDROCHLOROTHIAZIDE 25 MG/1
1 TABLET ORAL DAILY
COMMUNITY

## 2024-09-04 RX ORDER — GABAPENTIN 300 MG/1
1 CAPSULE ORAL 2 TIMES DAILY
COMMUNITY
Start: 2024-04-12

## 2024-09-04 RX ORDER — METOPROLOL TARTRATE 50 MG/1
1.5 TABLET ORAL 2 TIMES DAILY
COMMUNITY
Start: 2024-04-12

## 2024-09-04 RX ORDER — ROSUVASTATIN CALCIUM 40 MG/1
1 TABLET, COATED ORAL NIGHTLY
COMMUNITY
Start: 2024-04-12

## 2024-09-04 RX ORDER — ERGOCALCIFEROL 1.25 MG/1
1 CAPSULE ORAL
COMMUNITY
Start: 2024-04-12

## 2024-09-04 RX ORDER — ASPIRIN 81 MG/1
81 TABLET ORAL EVERY OTHER DAY
COMMUNITY

## 2024-09-04 RX ORDER — LATANOPROST 50 UG/ML
1 SOLUTION/ DROPS OPHTHALMIC 2 TIMES DAILY
COMMUNITY

## 2024-09-04 RX ORDER — VERAPAMIL HYDROCHLORIDE 120 MG/1
1 TABLET, FILM COATED ORAL 3 TIMES DAILY
COMMUNITY
Start: 2024-04-12

## 2024-09-04 RX ORDER — AMITRIPTYLINE HYDROCHLORIDE 10 MG/1
10 TABLET, FILM COATED ORAL NIGHTLY
COMMUNITY

## 2024-09-04 RX ORDER — ISOSORBIDE MONONITRATE 30 MG/1
1 TABLET, EXTENDED RELEASE ORAL DAILY
COMMUNITY

## 2024-09-04 RX ORDER — LOSARTAN POTASSIUM 50 MG/1
1 TABLET ORAL DAILY
COMMUNITY
Start: 2024-04-12

## 2024-09-04 RX ORDER — INSULIN LISPRO 100 [IU]/ML
20 INJECTION, SOLUTION INTRAVENOUS; SUBCUTANEOUS
COMMUNITY
Start: 2024-04-12

## 2024-09-04 RX ORDER — ATORVASTATIN CALCIUM 20 MG/1
1 TABLET, FILM COATED ORAL DAILY
COMMUNITY

## 2024-09-04 RX ORDER — SEMAGLUTIDE 0.68 MG/ML
0.25 INJECTION, SOLUTION SUBCUTANEOUS WEEKLY
COMMUNITY
Start: 2023-12-11

## 2024-09-04 RX ORDER — ALBUTEROL SULFATE 90 UG/1
2 INHALANT RESPIRATORY (INHALATION) 4 TIMES DAILY
COMMUNITY

## 2024-09-04 RX ORDER — ZOLPIDEM TARTRATE 10 MG/1
10 TABLET ORAL NIGHTLY
COMMUNITY
Start: 2024-08-25

## 2024-09-04 RX ORDER — INSULIN GLARGINE 100 [IU]/ML
48 INJECTION, SOLUTION SUBCUTANEOUS NIGHTLY
COMMUNITY

## 2024-09-04 NOTE — CPM/PAT NURSE NOTE
CPM/PAT Nurse Note      Name: Abbie Mayo (Abbie Mayo)  /Age: 1955/69 y.o.     Abbie Mayo is scheduled for T10-T11 laminectomy facetectomy with T10 -11 posterior nonsegmental instrumented fusion using local bone autograft and DBM along with L2-3 minimally invasive laminectomy with the use of operative microscope. - Bilateral on 24  Past Medical History:   Diagnosis Date    Anxiety     Hyperlipidemia     Hypertension     Type 2 diabetes mellitus (Multi)        No past surgical history on file.    Patient  has no history on file for sexual activity.    No family history on file.    No Known Allergies    Prior to Admission medications    Not on File        PAT ROS     DASI Risk Score    No data to display       Caprini DVT Assessment    No data to display       Modified Frailty Index    No data to display       CHADS2 Stroke Risk  Current as of 3 hours ago        N/A 3 to 100%: High Risk   2 to < 3%: Medium Risk   0 to < 2%: Low Risk     Last Change: N/A          This score determines the patient's risk of having a stroke if the patient has atrial fibrillation.        This score is not applicable to this patient. Components are not calculated.          Revised Cardiac Risk Index    No data to display       Apfel Simplified Score    No data to display       Risk Analysis Index Results This Encounter    No data found in the last 1 encounters.     Providers:                                                                                                         Cardiology: Rufino Gresham History of MI ,PVCs (premature ventricular contractions), ischemic cardiac disease   Seen by Dr. Newton 24, Stress test and Echo on 24    PCP: Coretta patiño [facility], LOV 24    NeuroSx: Ajith Lopez 24, presents with complaints LOW BACK PAIN radiation to LEFT LEG  AND right leg that started about   2 years  ago. Severe spinal stenosis in the thoracic spine resulting in signs or symptoms of  thoracic myelopathy along with severe lumbar stenosis causing neurogenic claudication        --TESTING:      - Echo/Stress test: 09/05/24, see media  Impression:   No stress induced ischemia. There is septal hypokinesis. There is good wall motion throughout the remainder of left ventricle. Left ventricular EF of 58%      - MR Thoracic spine: 08/23/24  IMPRESSION:  Moderate to severe central spinal stenosis, bilateral neural foramina  narrowing involving lower levels of the thoracic spine causing  moderate to severe deformity of the thecal sac and spinal cord  compression. 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 Follow-up with  high-resolution MRI is recommended.      Mild-to-moderate central spinal stenosis, bilateral neural foramina  narrowing involving the remaining levels of the thoracic spine.        _____________________________________________  Medication instructions:   Instructed to hold Vitamins, Supplements and Ibuprofen 7 days prior to surgery     Rosa Keenan LPN  Preadmission Testing        Nurse Plan of Action:   Patient reports cough with phelgm, advised to see PCP  Cardiology input provided see media 9/10/24

## 2024-09-09 ENCOUNTER — APPOINTMENT (OUTPATIENT)
Dept: RADIOLOGY | Facility: HOSPITAL | Age: 69
End: 2024-09-09
Payer: MEDICARE

## 2024-09-09 ENCOUNTER — HOSPITAL ENCOUNTER (INPATIENT)
Facility: HOSPITAL | Age: 69
LOS: 4 days | Discharge: HOME | End: 2024-09-13
Attending: EMERGENCY MEDICINE | Admitting: NEUROLOGICAL SURGERY
Payer: MEDICARE

## 2024-09-09 ENCOUNTER — CLINICAL SUPPORT (OUTPATIENT)
Dept: EMERGENCY MEDICINE | Facility: HOSPITAL | Age: 69
End: 2024-09-09
Payer: MEDICARE

## 2024-09-09 ENCOUNTER — PRE-ADMISSION TESTING (OUTPATIENT)
Dept: PREADMISSION TESTING | Facility: HOSPITAL | Age: 69
End: 2024-09-09
Payer: MEDICARE

## 2024-09-09 VITALS
BODY MASS INDEX: 35.36 KG/M2 | WEIGHT: 220 LBS | DIASTOLIC BLOOD PRESSURE: 74 MMHG | HEART RATE: 68 BPM | HEIGHT: 66 IN | TEMPERATURE: 97.2 F | SYSTOLIC BLOOD PRESSURE: 139 MMHG | OXYGEN SATURATION: 98 %

## 2024-09-09 DIAGNOSIS — R60.0 LOCALIZED EDEMA: ICD-10-CM

## 2024-09-09 DIAGNOSIS — Z79.4 TYPE 2 DIABETES MELLITUS WITH STAGE 2 CHRONIC KIDNEY DISEASE, WITH LONG-TERM CURRENT USE OF INSULIN (MULTI): ICD-10-CM

## 2024-09-09 DIAGNOSIS — Z79.4 TYPE 2 DIABETES MELLITUS WITH STAGE 3 CHRONIC KIDNEY DISEASE, WITH LONG-TERM CURRENT USE OF INSULIN, UNSPECIFIED WHETHER STAGE 3A OR 3B CKD (MULTI): ICD-10-CM

## 2024-09-09 DIAGNOSIS — Z79.4 TYPE 2 DIABETES MELLITUS WITH HYPEROSMOLARITY WITHOUT COMA, WITH LONG-TERM CURRENT USE OF INSULIN (MULTI): ICD-10-CM

## 2024-09-09 DIAGNOSIS — N39.0 URINARY TRACT INFECTION WITHOUT HEMATURIA, SITE UNSPECIFIED: ICD-10-CM

## 2024-09-09 DIAGNOSIS — E11.00 TYPE 2 DIABETES MELLITUS WITH HYPEROSMOLARITY WITHOUT COMA, WITH LONG-TERM CURRENT USE OF INSULIN (MULTI): ICD-10-CM

## 2024-09-09 DIAGNOSIS — M51.04 THORACIC DISC DISEASE WITH MYELOPATHY: ICD-10-CM

## 2024-09-09 DIAGNOSIS — M48.00 SPINAL STENOSIS, UNSPECIFIED SPINAL REGION: ICD-10-CM

## 2024-09-09 DIAGNOSIS — M47.10 SPINAL CORD COMPRESSION DUE TO DEGENERATIVE DISORDER OF SPINAL COLUMN: ICD-10-CM

## 2024-09-09 DIAGNOSIS — E11.22 TYPE 2 DIABETES MELLITUS WITH STAGE 3 CHRONIC KIDNEY DISEASE, WITH LONG-TERM CURRENT USE OF INSULIN, UNSPECIFIED WHETHER STAGE 3A OR 3B CKD (MULTI): ICD-10-CM

## 2024-09-09 DIAGNOSIS — M79.89 SWELLING OF BOTH LOWER EXTREMITIES: ICD-10-CM

## 2024-09-09 DIAGNOSIS — E11.22 TYPE 2 DIABETES MELLITUS WITH STAGE 2 CHRONIC KIDNEY DISEASE, WITH LONG-TERM CURRENT USE OF INSULIN (MULTI): ICD-10-CM

## 2024-09-09 DIAGNOSIS — M47.14 THORACIC MYELOPATHY: Primary | ICD-10-CM

## 2024-09-09 DIAGNOSIS — I25.2: ICD-10-CM

## 2024-09-09 DIAGNOSIS — Z01.818 PREOPERATIVE CLEARANCE: ICD-10-CM

## 2024-09-09 DIAGNOSIS — Z41.9 SURGERY, ELECTIVE: Primary | ICD-10-CM

## 2024-09-09 DIAGNOSIS — J45.40 MODERATE PERSISTENT ASTHMA, UNSPECIFIED WHETHER COMPLICATED (HHS-HCC): ICD-10-CM

## 2024-09-09 DIAGNOSIS — M48.062 LUMBAR STENOSIS WITH NEUROGENIC CLAUDICATION: ICD-10-CM

## 2024-09-09 DIAGNOSIS — N18.2 TYPE 2 DIABETES MELLITUS WITH STAGE 2 CHRONIC KIDNEY DISEASE, WITH LONG-TERM CURRENT USE OF INSULIN (MULTI): ICD-10-CM

## 2024-09-09 DIAGNOSIS — M54.16 LUMBAR RADICULOPATHY: ICD-10-CM

## 2024-09-09 DIAGNOSIS — N18.30 TYPE 2 DIABETES MELLITUS WITH STAGE 3 CHRONIC KIDNEY DISEASE, WITH LONG-TERM CURRENT USE OF INSULIN, UNSPECIFIED WHETHER STAGE 3A OR 3B CKD (MULTI): ICD-10-CM

## 2024-09-09 LAB
ABO GROUP (TYPE) IN BLOOD: NORMAL
ALBUMIN SERPL BCP-MCNC: 4.1 G/DL (ref 3.4–5)
ALP SERPL-CCNC: 126 U/L (ref 33–136)
ALT SERPL W P-5'-P-CCNC: 18 U/L (ref 7–45)
ANION GAP BLDV CALCULATED.4IONS-SCNC: 10 MMOL/L (ref 10–25)
ANION GAP SERPL CALC-SCNC: 12 MMOL/L (ref 10–20)
ANION GAP SERPL CALC-SCNC: 15 MMOL/L (ref 10–20)
ANTIBODY SCREEN: NORMAL
AST SERPL W P-5'-P-CCNC: 15 U/L (ref 9–39)
BASE EXCESS BLDV CALC-SCNC: 3 MMOL/L (ref -2–3)
BASOPHILS # BLD AUTO: 0.03 X10*3/UL (ref 0–0.1)
BASOPHILS NFR BLD AUTO: 0.6 %
BILIRUB SERPL-MCNC: 0.6 MG/DL (ref 0–1.2)
BNP SERPL-MCNC: 85 PG/ML (ref 0–99)
BODY TEMPERATURE: 37 DEGREES CELSIUS
BUN SERPL-MCNC: 15 MG/DL (ref 6–23)
BUN SERPL-MCNC: 16 MG/DL (ref 6–23)
CA-I BLDV-SCNC: 1.16 MMOL/L (ref 1.1–1.33)
CALCIUM SERPL-MCNC: 9.1 MG/DL (ref 8.6–10.6)
CALCIUM SERPL-MCNC: 9.2 MG/DL (ref 8.6–10.6)
CARDIAC TROPONIN I PNL SERPL HS: 18 NG/L (ref 0–34)
CHLORIDE BLDV-SCNC: 103 MMOL/L (ref 98–107)
CHLORIDE SERPL-SCNC: 103 MMOL/L (ref 98–107)
CHLORIDE SERPL-SCNC: 104 MMOL/L (ref 98–107)
CO2 SERPL-SCNC: 28 MMOL/L (ref 21–32)
CO2 SERPL-SCNC: 29 MMOL/L (ref 21–32)
CREAT SERPL-MCNC: 0.8 MG/DL (ref 0.5–1.05)
CREAT SERPL-MCNC: 0.83 MG/DL (ref 0.5–1.05)
EGFRCR SERPLBLD CKD-EPI 2021: 77 ML/MIN/1.73M*2
EGFRCR SERPLBLD CKD-EPI 2021: 80 ML/MIN/1.73M*2
EOSINOPHIL # BLD AUTO: 0.13 X10*3/UL (ref 0–0.7)
EOSINOPHIL NFR BLD AUTO: 2.5 %
ERYTHROCYTE [DISTWIDTH] IN BLOOD BY AUTOMATED COUNT: 12.4 % (ref 11.5–14.5)
ERYTHROCYTE [DISTWIDTH] IN BLOOD BY AUTOMATED COUNT: 12.6 % (ref 11.5–14.5)
FLUAV RNA RESP QL NAA+PROBE: NOT DETECTED
FLUBV RNA RESP QL NAA+PROBE: NOT DETECTED
GLUCOSE BLD MANUAL STRIP-MCNC: 318 MG/DL (ref 74–99)
GLUCOSE BLDV-MCNC: 275 MG/DL (ref 74–99)
GLUCOSE SERPL-MCNC: 256 MG/DL (ref 74–99)
GLUCOSE SERPL-MCNC: 289 MG/DL (ref 74–99)
HCO3 BLDV-SCNC: 29.8 MMOL/L (ref 22–26)
HCT VFR BLD AUTO: 38.9 % (ref 36–46)
HCT VFR BLD AUTO: 38.9 % (ref 36–46)
HCT VFR BLD EST: 39 % (ref 36–46)
HGB BLD-MCNC: 12.1 G/DL (ref 12–16)
HGB BLD-MCNC: 12.7 G/DL (ref 12–16)
HGB BLDV-MCNC: 13.1 G/DL (ref 12–16)
IMM GRANULOCYTES # BLD AUTO: 0.02 X10*3/UL (ref 0–0.7)
IMM GRANULOCYTES NFR BLD AUTO: 0.4 % (ref 0–0.9)
INHALED O2 CONCENTRATION: 21 %
LACTATE BLDV-SCNC: 1.6 MMOL/L (ref 0.4–2)
LYMPHOCYTES # BLD AUTO: 2 X10*3/UL (ref 1.2–4.8)
LYMPHOCYTES NFR BLD AUTO: 38.3 %
MCH RBC QN AUTO: 27 PG (ref 26–34)
MCH RBC QN AUTO: 27.2 PG (ref 26–34)
MCHC RBC AUTO-ENTMCNC: 31.1 G/DL (ref 32–36)
MCHC RBC AUTO-ENTMCNC: 32.6 G/DL (ref 32–36)
MCV RBC AUTO: 83 FL (ref 80–100)
MCV RBC AUTO: 87 FL (ref 80–100)
MONOCYTES # BLD AUTO: 0.38 X10*3/UL (ref 0.1–1)
MONOCYTES NFR BLD AUTO: 7.3 %
NEUTROPHILS # BLD AUTO: 2.66 X10*3/UL (ref 1.2–7.7)
NEUTROPHILS NFR BLD AUTO: 50.9 %
NRBC BLD-RTO: 0 /100 WBCS (ref 0–0)
NRBC BLD-RTO: 0 /100 WBCS (ref 0–0)
OXYHGB MFR BLDV: 49.3 % (ref 45–75)
PCO2 BLDV: 54 MM HG (ref 41–51)
PH BLDV: 7.35 PH (ref 7.33–7.43)
PLATELET # BLD AUTO: 203 X10*3/UL (ref 150–450)
PLATELET # BLD AUTO: 212 X10*3/UL (ref 150–450)
PO2 BLDV: 35 MM HG (ref 35–45)
POTASSIUM BLDV-SCNC: 4.2 MMOL/L (ref 3.5–5.3)
POTASSIUM SERPL-SCNC: 4 MMOL/L (ref 3.5–5.3)
POTASSIUM SERPL-SCNC: 4.3 MMOL/L (ref 3.5–5.3)
PROT SERPL-MCNC: 6.9 G/DL (ref 6.4–8.2)
RBC # BLD AUTO: 4.48 X10*6/UL (ref 4–5.2)
RBC # BLD AUTO: 4.67 X10*6/UL (ref 4–5.2)
RH FACTOR (ANTIGEN D): NORMAL
SAO2 % BLDV: 50 % (ref 45–75)
SARS-COV-2 RNA RESP QL NAA+PROBE: NOT DETECTED
SODIUM BLDV-SCNC: 139 MMOL/L (ref 136–145)
SODIUM SERPL-SCNC: 141 MMOL/L (ref 136–145)
SODIUM SERPL-SCNC: 142 MMOL/L (ref 136–145)
WBC # BLD AUTO: 5.2 X10*3/UL (ref 4.4–11.3)
WBC # BLD AUTO: 5.4 X10*3/UL (ref 4.4–11.3)

## 2024-09-09 PROCEDURE — 80048 BASIC METABOLIC PNL TOTAL CA: CPT | Performed by: ANESTHESIOLOGY

## 2024-09-09 PROCEDURE — 96375 TX/PRO/DX INJ NEW DRUG ADDON: CPT

## 2024-09-09 PROCEDURE — 2500000002 HC RX 250 W HCPCS SELF ADMINISTERED DRUGS (ALT 637 FOR MEDICARE OP, ALT 636 FOR OP/ED): Performed by: PHYSICIAN ASSISTANT

## 2024-09-09 PROCEDURE — 36415 COLL VENOUS BLD VENIPUNCTURE: CPT | Performed by: PHYSICIAN ASSISTANT

## 2024-09-09 PROCEDURE — 83036 HEMOGLOBIN GLYCOSYLATED A1C: CPT | Performed by: ANESTHESIOLOGY

## 2024-09-09 PROCEDURE — 1200000002 HC GENERAL ROOM WITH TELEMETRY DAILY

## 2024-09-09 PROCEDURE — 87081 CULTURE SCREEN ONLY: CPT | Performed by: ANESTHESIOLOGY

## 2024-09-09 PROCEDURE — 86900 BLOOD TYPING SEROLOGIC ABO: CPT | Performed by: ANESTHESIOLOGY

## 2024-09-09 PROCEDURE — 71046 X-RAY EXAM CHEST 2 VIEWS: CPT | Performed by: STUDENT IN AN ORGANIZED HEALTH CARE EDUCATION/TRAINING PROGRAM

## 2024-09-09 PROCEDURE — 84484 ASSAY OF TROPONIN QUANT: CPT | Performed by: PHYSICIAN ASSISTANT

## 2024-09-09 PROCEDURE — 2500000004 HC RX 250 GENERAL PHARMACY W/ HCPCS (ALT 636 FOR OP/ED)

## 2024-09-09 PROCEDURE — 96365 THER/PROPH/DIAG IV INF INIT: CPT

## 2024-09-09 PROCEDURE — 83880 ASSAY OF NATRIURETIC PEPTIDE: CPT | Performed by: PHYSICIAN ASSISTANT

## 2024-09-09 PROCEDURE — 71046 X-RAY EXAM CHEST 2 VIEWS: CPT

## 2024-09-09 PROCEDURE — 84132 ASSAY OF SERUM POTASSIUM: CPT | Performed by: PHYSICIAN ASSISTANT

## 2024-09-09 PROCEDURE — 85027 COMPLETE CBC AUTOMATED: CPT | Performed by: ANESTHESIOLOGY

## 2024-09-09 PROCEDURE — 36415 COLL VENOUS BLD VENIPUNCTURE: CPT

## 2024-09-09 PROCEDURE — 2500000001 HC RX 250 WO HCPCS SELF ADMINISTERED DRUGS (ALT 637 FOR MEDICARE OP)

## 2024-09-09 PROCEDURE — 96366 THER/PROPH/DIAG IV INF ADDON: CPT

## 2024-09-09 PROCEDURE — 2500000001 HC RX 250 WO HCPCS SELF ADMINISTERED DRUGS (ALT 637 FOR MEDICARE OP): Performed by: PHYSICIAN ASSISTANT

## 2024-09-09 PROCEDURE — 99205 OFFICE O/P NEW HI 60 MIN: CPT | Performed by: ANESTHESIOLOGY

## 2024-09-09 PROCEDURE — 85025 COMPLETE CBC W/AUTO DIFF WBC: CPT | Performed by: PHYSICIAN ASSISTANT

## 2024-09-09 PROCEDURE — 94640 AIRWAY INHALATION TREATMENT: CPT

## 2024-09-09 PROCEDURE — 82785 ASSAY OF IGE: CPT | Performed by: ANESTHESIOLOGY

## 2024-09-09 PROCEDURE — 82947 ASSAY GLUCOSE BLOOD QUANT: CPT

## 2024-09-09 PROCEDURE — 99285 EMERGENCY DEPT VISIT HI MDM: CPT

## 2024-09-09 PROCEDURE — 2500000004 HC RX 250 GENERAL PHARMACY W/ HCPCS (ALT 636 FOR OP/ED): Performed by: PHYSICIAN ASSISTANT

## 2024-09-09 PROCEDURE — 99285 EMERGENCY DEPT VISIT HI MDM: CPT | Performed by: PHYSICIAN ASSISTANT

## 2024-09-09 PROCEDURE — 87636 SARSCOV2 & INF A&B AMP PRB: CPT | Performed by: PHYSICIAN ASSISTANT

## 2024-09-09 PROCEDURE — 93005 ELECTROCARDIOGRAM TRACING: CPT

## 2024-09-09 PROCEDURE — 93010 ELECTROCARDIOGRAM REPORT: CPT | Performed by: PHYSICIAN ASSISTANT

## 2024-09-09 RX ORDER — IPRATROPIUM BROMIDE AND ALBUTEROL SULFATE 2.5; .5 MG/3ML; MG/3ML
3 SOLUTION RESPIRATORY (INHALATION) EVERY 20 MIN
Status: COMPLETED | OUTPATIENT
Start: 2024-09-09 | End: 2024-09-09

## 2024-09-09 RX ORDER — MAGNESIUM SULFATE HEPTAHYDRATE 40 MG/ML
2 INJECTION, SOLUTION INTRAVENOUS ONCE
Status: COMPLETED | OUTPATIENT
Start: 2024-09-09 | End: 2024-09-09

## 2024-09-09 RX ORDER — CHLORHEXIDINE GLUCONATE ORAL RINSE 1.2 MG/ML
15 SOLUTION DENTAL DAILY
Qty: 473 ML | Refills: 0 | Status: SHIPPED | OUTPATIENT
Start: 2024-09-09 | End: 2024-09-13 | Stop reason: HOSPADM

## 2024-09-09 RX ORDER — DEXTROSE 50 % IN WATER (D50W) INTRAVENOUS SYRINGE
25
Status: DISCONTINUED | OUTPATIENT
Start: 2024-09-09 | End: 2024-09-13 | Stop reason: HOSPADM

## 2024-09-09 RX ORDER — HYDROMORPHONE HYDROCHLORIDE 1 MG/ML
0.2 INJECTION, SOLUTION INTRAMUSCULAR; INTRAVENOUS; SUBCUTANEOUS EVERY 4 HOURS PRN
Status: DISCONTINUED | OUTPATIENT
Start: 2024-09-09 | End: 2024-09-12

## 2024-09-09 RX ORDER — NALOXONE HYDROCHLORIDE 0.4 MG/ML
0.2 INJECTION, SOLUTION INTRAMUSCULAR; INTRAVENOUS; SUBCUTANEOUS EVERY 5 MIN PRN
Status: DISCONTINUED | OUTPATIENT
Start: 2024-09-09 | End: 2024-09-13 | Stop reason: HOSPADM

## 2024-09-09 RX ORDER — HEPARIN SODIUM 5000 [USP'U]/ML
5000 INJECTION, SOLUTION INTRAVENOUS; SUBCUTANEOUS EVERY 8 HOURS
Status: DISCONTINUED | OUTPATIENT
Start: 2024-09-09 | End: 2024-09-13 | Stop reason: HOSPADM

## 2024-09-09 RX ORDER — DEXTROSE 50 % IN WATER (D50W) INTRAVENOUS SYRINGE
12.5
Status: DISCONTINUED | OUTPATIENT
Start: 2024-09-09 | End: 2024-09-13 | Stop reason: HOSPADM

## 2024-09-09 RX ORDER — POLYETHYLENE GLYCOL 3350 17 G/17G
17 POWDER, FOR SOLUTION ORAL EVERY 12 HOURS
Status: DISCONTINUED | OUTPATIENT
Start: 2024-09-09 | End: 2024-09-13 | Stop reason: HOSPADM

## 2024-09-09 RX ORDER — AMOXICILLIN 250 MG
2 CAPSULE ORAL 2 TIMES DAILY
Status: DISCONTINUED | OUTPATIENT
Start: 2024-09-09 | End: 2024-09-13 | Stop reason: HOSPADM

## 2024-09-09 RX ORDER — OXYCODONE HYDROCHLORIDE 5 MG/1
2.5 TABLET ORAL EVERY 4 HOURS PRN
Status: DISCONTINUED | OUTPATIENT
Start: 2024-09-09 | End: 2024-09-13 | Stop reason: HOSPADM

## 2024-09-09 RX ORDER — BENZONATATE 100 MG/1
200 CAPSULE ORAL ONCE
Status: COMPLETED | OUTPATIENT
Start: 2024-09-09 | End: 2024-09-09

## 2024-09-09 RX ORDER — CHLORHEXIDINE GLUCONATE 40 MG/ML
1 SOLUTION TOPICAL DAILY
Qty: 473 ML | Refills: 0 | Status: SHIPPED | OUTPATIENT
Start: 2024-09-09 | End: 2024-09-13 | Stop reason: HOSPADM

## 2024-09-09 RX ORDER — OXYCODONE HYDROCHLORIDE 5 MG/1
5 TABLET ORAL EVERY 4 HOURS PRN
Status: DISCONTINUED | OUTPATIENT
Start: 2024-09-09 | End: 2024-09-13 | Stop reason: HOSPADM

## 2024-09-09 RX ORDER — OXYCODONE HYDROCHLORIDE 10 MG/1
10 TABLET ORAL EVERY 4 HOURS PRN
Status: DISCONTINUED | OUTPATIENT
Start: 2024-09-09 | End: 2024-09-13 | Stop reason: HOSPADM

## 2024-09-09 RX ORDER — ACETAMINOPHEN 325 MG/1
650 TABLET ORAL EVERY 6 HOURS
Status: DISCONTINUED | OUTPATIENT
Start: 2024-09-09 | End: 2024-09-13 | Stop reason: HOSPADM

## 2024-09-09 RX ORDER — INSULIN LISPRO 100 [IU]/ML
0-10 INJECTION, SOLUTION INTRAVENOUS; SUBCUTANEOUS
Status: DISCONTINUED | OUTPATIENT
Start: 2024-09-10 | End: 2024-09-10

## 2024-09-09 RX ORDER — METFORMIN HYDROCHLORIDE 500 MG/1
0.5 TABLET ORAL
COMMUNITY

## 2024-09-09 ASSESSMENT — ENCOUNTER SYMPTOMS
COUGH: 1
SHORTNESS OF BREATH: 1
GASTROINTESTINAL NEGATIVE: 1
DYSPNEA AT REST: 1
NUMBNESS: 1
WEAKNESS: 1
ARTHRALGIAS: 1
MYALGIAS: 1
NECK NEGATIVE: 1
CHILLS: 1
WHEEZING: 1
FEVER: 1

## 2024-09-09 ASSESSMENT — DUKE ACTIVITY SCORE INDEX (DASI)
CAN YOU DO HEAVY WORK AROUND THE HOUSE LIKE SCRUBBING FLOORS OR LIFTING AND MOVING HEAVY FURNITURE: NO
CAN YOU DO LIGHT WORK AROUND THE HOUSE LIKE DUSTING OR WASHING DISHES: NO
CAN YOU CLIMB A FLIGHT OF STAIRS OR WALK UP A HILL: NO
TOTAL_SCORE: 0
CAN YOU DO MODERATE WORK AROUND THE HOUSE LIKE VACUUMING, SWEEPING FLOORS OR CARRYING GROCERIES: NO
CAN YOU WALK INDOORS, SUCH AS AROUND YOUR HOUSE: NO
CAN YOU WALK A BLOCK OR TWO ON LEVEL GROUND: NO
CAN YOU TAKE CARE OF YOURSELF (EAT, DRESS, BATHE, OR USE TOILET): NO
CAN YOU DO YARD WORK LIKE RAKING LEAVES, WEEDING OR PUSHING A MOWER: NO
CAN YOU PARTICIPATE IN MODERATE RECREATIONAL ACTIVITIES LIKE GOLF, BOWLING, DANCING, DOUBLES TENNIS OR THROWING A BASEBALL OR FOOTBALL: NO
CAN YOU PARTICIPATE IN STRENOUS SPORTS LIKE SWIMMING, SINGLES TENNIS, FOOTBALL, BASKETBALL, OR SKIING: NO
CAN YOU RUN A SHORT DISTANCE: NO
CAN YOU HAVE SEXUAL RELATIONS: NO
DASI METS SCORE: 2.7

## 2024-09-09 ASSESSMENT — PAIN DESCRIPTION - LOCATION
LOCATION: LEG
LOCATION: LEG

## 2024-09-09 ASSESSMENT — COLUMBIA-SUICIDE SEVERITY RATING SCALE - C-SSRS
2. HAVE YOU ACTUALLY HAD ANY THOUGHTS OF KILLING YOURSELF?: NO
6. HAVE YOU EVER DONE ANYTHING, STARTED TO DO ANYTHING, OR PREPARED TO DO ANYTHING TO END YOUR LIFE?: NO
1. IN THE PAST MONTH, HAVE YOU WISHED YOU WERE DEAD OR WISHED YOU COULD GO TO SLEEP AND NOT WAKE UP?: NO

## 2024-09-09 ASSESSMENT — PAIN - FUNCTIONAL ASSESSMENT
PAIN_FUNCTIONAL_ASSESSMENT: 0-10
PAIN_FUNCTIONAL_ASSESSMENT: 0-10

## 2024-09-09 ASSESSMENT — PAIN DESCRIPTION - ONSET: ONSET: ONGOING

## 2024-09-09 ASSESSMENT — PAIN SCALES - GENERAL
PAINLEVEL_OUTOF10: 8
PAINLEVEL_OUTOF10: 9
PAINLEVEL_OUTOF10: 8

## 2024-09-09 ASSESSMENT — LIFESTYLE VARIABLES: SMOKING_STATUS: NONSMOKER

## 2024-09-09 ASSESSMENT — PAIN DESCRIPTION - ORIENTATION
ORIENTATION: LEFT;RIGHT
ORIENTATION: RIGHT;LEFT

## 2024-09-09 ASSESSMENT — PAIN DESCRIPTION - PROGRESSION: CLINICAL_PROGRESSION: NOT CHANGED

## 2024-09-09 ASSESSMENT — PAIN DESCRIPTION - FREQUENCY: FREQUENCY: CONSTANT/CONTINUOUS

## 2024-09-09 ASSESSMENT — PAIN DESCRIPTION - DESCRIPTORS: DESCRIPTORS: ACHING

## 2024-09-09 ASSESSMENT — PAIN DESCRIPTION - PAIN TYPE: TYPE: CHRONIC PAIN

## 2024-09-09 NOTE — ED TRIAGE NOTES
Pt was at pre-op testing and had a productive cough so they told her to come here to rule out pneumonia. Pt has had the cough for 3 weeks.

## 2024-09-09 NOTE — ED PROVIDER NOTES
"69 y.o.  female  scheduled for T10-T11 laminectomy on 9/12 with Dr. Lopez for spinal stenosis.  PMHX includes asthma, HTN, HLD, MI (medically treated), spinal stenosis PVCs who presents to the ED from preadmission testing for shortness of breath as well as a cough for the past 3 weeks with plan for admission for medical optimization prior to her surgery scheduled on 9/12.  She states that she has been having a productive cough with clear/white sputum for 3 weeks that has been gradually worsening.  She tried using her home rescue inhaler without relief and finished a course of azithromycin without relief of her symptoms.  She does endorse a history of asthma and states this feels somewhat similar to her previous asthma exacerbations.  She states that her son always seems to have a cough but denies any other known sick contacts.  She denies any fevers or chills.  She endorses chronic weakness to her bilateral lower extremities which is unchanged from her baseline.  She states that she has been wheelchair-bound for the past 6 months.  She denies any new leg pain, swelling, or discoloration.      History provided by:  Patient and medical records   used: No             Visit Vitals  /81 (BP Location: Left arm, Patient Position: Sitting)   Pulse 85   Temp 36.5 °C (97.7 °F) (Temporal)   Resp 16   Ht 1.676 m (5' 6\")   Wt 99.8 kg (220 lb)   SpO2 98%   BMI 35.51 kg/m²   Smoking Status Never   BSA 2.16 m²          Physical Exam     Physical exam:   General: Vitals noted, no distress. Afebrile.   EENT:  Hearing grossly intact. Normal phonation. MMM. Airway patient. PERRL. EOMI.   Neck: No midline tenderness or paraspinal tenderness. FROM.   Cardiac: Regular, rate, rhythm. Normal S1 and S2.  No murmurs, gallops, rubs.   Pulmonary: Good air exchange.  Diffuse and expiratory wheezing bilaterally.  No increased work of breathing.  Abdomen: Soft, nonsurgical. Nontender. No peritoneal signs. Normoactive " bowel sounds.   Back: No CVA tenderness. No midline tenderness or paraspinal tenderness. No obvious deformity.   Extremities: No peripheral edema.  Full range of motion. Moves all extremities freely. No tenderness throughout extremities.   Skin: No rash. Warm and Dry.   Neuro: No focal neurologic deficits. CN 2-12 grossly intact. Sensation equal bilaterally.  Bilateral lower extremity weakness.  Full strength and sensation to upper extremities bilaterally.        Labs Reviewed   BLOOD GAS VENOUS FULL PANEL - Abnormal       Result Value    POCT pH, Venous 7.35      POCT pCO2, Venous 54 (*)     POCT pO2, Venous 35      POCT SO2, Venous 50      POCT Oxy Hemoglobin, Venous 49.3      POCT Hematocrit Calculated, Venous 39.0      POCT Sodium, Venous 139      POCT Potassium, Venous 4.2      POCT Chloride, Venous 103      POCT Ionized Calicum, Venous 1.16      POCT Glucose, Venous 275 (*)     POCT Lactate, Venous 1.6      POCT Base Excess, Venous 3.0      POCT HCO3 Calculated, Venous 29.8 (*)     POCT Hemoglobin, Venous 13.1      POCT Anion Gap, Venous 10.0      Patient Temperature 37.0      FiO2 21     COMPREHENSIVE METABOLIC PANEL - Abnormal    Glucose 256 (*)     Sodium 142      Potassium 4.0      Chloride 103      Bicarbonate 28      Anion Gap 15      Urea Nitrogen 15      Creatinine 0.80      eGFR 80      Calcium 9.1      Albumin 4.1      Alkaline Phosphatase 126      Total Protein 6.9      AST 15      Bilirubin, Total 0.6      ALT 18     TROPONIN I, HIGH SENSITIVITY - Normal    Troponin I, High Sensitivity (CMC) 18      Narrative:     Less than 99th percentile of normal range cutoff-  Female and children under 18 years old <35 ng/L; Male <54 ng/L: Negative  Repeat testing should be performed if clinically indicated.     Female and children under 18 years old  ng/L; Male  ng/L:  Consistent with possible cardiac damage and possible increased clinical   risk. Serial measurements may help to assess extent of  myocardial damage.     >120 ng/L: Consistent with cardiac damage, increased clinical risk and  myocardial infarction. Serial measurements may help assess extent of   myocardial damage.      NOTE: Children less than 1 year old may have higher baseline troponin   levels and results should be interpreted in conjunction with the overall   clinical context.    NOTE: Troponin I testing is performed using a different   testing methodology at [facility] than at [facility]. Direct result comparisons should only   be made within the same method.     B-TYPE NATRIURETIC PEPTIDE - Normal    BNP 85      Narrative:        <100 pg/mL - Heart failure unlikely  100-299 pg/mL - Intermediate probability of acute heart                  failure exacerbation. Correlate with clinical                  context and patient history.    >=300 pg/mL - Heart Failure likely. Correlate with clinical                  context and patient history.     Biotin interference may cause falsely decreased results. Patients taking a Biotin dose of up to 5 mg/day should refrain from taking Biotin for 24 hours before sample  collection. Providers may contact their local laboratory for further information.   INFLUENZA A AND B PCR - Normal    Flu A Result Not Detected      Flu B Result Not Detected      Narrative:     This assay is an in vitro diagnostic multiplex nucleic acid amplification test for the detection and discrimination of Influenza A & B from nasopharyngeal specimens, and has been validated for use at [facility]. Negative results do not preclude Influenza A/B infections, and should not be used as the sole basis for diagnosis, treatment, or other management decisions. If Influenza A/B and RSV PCR results are negative, testing for Parainfluenza virus, Adenovirus and Metapneumovirus is routinely performed for [facility] [place] and intensive [place] in[place], and is available on other patients by  placing an add-on request.   SARS-COV-2 PCR - Normal    Coronavirus 2019, PCR Not Detected      Narrative:     This assay has received FDA Emergency Use Authorization (EUA) and is only authorized for the duration of time that circumstances exist to justify the authorization of the emergency use of in vitro diagnostic tests for the detection of SARS-CoV-2 virus and/or diagnosis of COVID-19 infection under section 564(b)(1) of the Act, 21 U.S.C. 360bbb-3(b)(1). This assay is an in vitro diagnostic nucleic acid amplification test for the qualitative detection of SARS-CoV-2 from nasopharyngeal specimens and has been validated for use at [facility]. Negative results do not preclude COVID-19 infections and should not be used as the sole basis for diagnosis, treatment, or other management decisions.     CBC WITH AUTO DIFFERENTIAL    WBC 5.2      nRBC 0.0      RBC 4.67      Hemoglobin 12.7      Hematocrit 38.9      MCV 83      MCH 27.2      MCHC 32.6      RDW 12.6      Platelets 203      Neutrophils % 50.9      Immature Granulocytes %, Automated 0.4      Lymphocytes % 38.3      Monocytes % 7.3      Eosinophils % 2.5      Basophils % 0.6      Neutrophils Absolute 2.66      Immature Granulocytes Absolute, Automated 0.02      Lymphocytes Absolute 2.00      Monocytes Absolute 0.38      Eosinophils Absolute 0.13      Basophils Absolute 0.03         XR chest 2 views   Final Result   No evidence of acute cardiopulmonary process.        MACRO:   None        Signed by: Pascale Dorsey 9/9/2024 4:22 PM   Dictation workstation:   [id number]              ED Course & MDM     Medical Decision Making  This is a 69-year-old female with past medical history of asthma, hypertension, hyperlipidemia, previous MI, spinal stenosis who presents to the ED with shortness of breath as well as cough for the past 3 weeks that has not improved despite a previous course of azithromycin as well as use of her home albuterol  inhaler.  Vital stable upon arrival to the ED, specifically SpO2 at 98% on room air.  On examination patient did have diffuse and expiratory wheezing bilaterally.  No increased work of breathing.  Examination otherwise grossly unremarkable.  Patient did have bilateral lower extremity weakness, however per her records and the patient that this is not new for her.  Chest x-ray as well as laboratory studies ordered for this patient.  Flu and COVID swab ordered.  Patient medicated with Solu-Medrol, magnesium, and 3 DuoNeb treatments for her shortness of breath.  Viral swabs negative.  Laboratory studies overall grossly unremarkable.  Normal troponin.  On my reassessment patient was still having some wheezing.  Chest x-ray showed no evidence of pneumonia.  I spoke with both the medicine admission team as well as neurosurgery team and it was decided she would be admitted to neurosurgery for further treatment of this asthma exacerbation as well as optimization prior to her surgery due to her upcoming surgical procedure with their team.  Patient was agreeable to this plan.  She had no further questions at this time and was admitted to neurosurgery for further management.    Amount and/or Complexity of Data Reviewed  Labs: ordered.  Radiology: ordered and independent interpretation performed.     Details: Chest x-ray without visualized pneumonia pneumothorax  ECG/medicine tests: ordered and independent interpretation performed.     Details: EKG normal sinus rhythm at 95 bpm without any acute ST elevation or depression.  No T wave inversions.  Normal axis.         Diagnoses as of 09/09/24 1924   Moderate persistent asthma, unspecified whether complicated ([facility])   Spinal stenosis, unspecified spinal region   Thoracic myelopathy       This was a shared visit with an ED attending.  The patient was seen and discussed with the ED attending    Procedures    DANNY Abraham, ROMULO Valle PA-C  09/09/24 1924

## 2024-09-09 NOTE — CPM/PAT H&P
CPM/PAT Evaluation       Name: Abbie Mayo (Abbie Mayo)  /Age: 1955/69 y.o.     In-Person       Chief Complaint: PAT; Laminectomy     HPI  69 y.o.  female  scheduled for T10-T11 laminectomy on  with Dr. Lopez for spinal stenosis.  PMHX includes asthma, HTN, HLD, HTN, MI (medically treated), spinal stenosis PVCs.  Presents to CPM today for perioperative risk stratification and optimization.  Patient seen last week by PCP for cough, flu like symptoms. Patient started on course of Azithromycin. Today patient has ongoing fever/chills, SOB, fatigue and productive cough. Patient states her symptoms are not improving with the current antibiotic.       Past Medical History:   Diagnosis Date    Anxiety     Asthma ([facility])     Complaining of cough     24-- c/o cough with phelgm    Coronary artery disease     History of chronic ischemic heart disease     Hyperlipidemia     Hypertension     Lumbar radiculopathy     Myocardial infarction ([facility])     Several years ago    Pseudoclaudication syndrome     Shortness of breath     Spinal stenosis     spinal stenosis in the thoracic spine    Type 2 diabetes mellitus ([facility])     Poorly controlled       Past Surgical History:   Procedure Laterality Date    CARDIAC CATHETERIZATION      CATARACT EXTRACTION      CHOLECYSTECTOMY      COLONOSCOPY      IRIDOTOMY / IRIDECTOMY      TRABECULECTOMY       Patient  has no history on file for sexual activity.    No family history on file.    No Known Allergies    Prior to Admission medications    Medication Sig Start Date End Date Taking? Authorizing Provider   albuterol 90 mcg/actuation inhaler Inhale 2 puffs 4 times a day.   Yes Historical Provider, MD   amitriptyline (Elavil) 10 mg tablet Take 1 tablet (10 mg) by mouth once daily at bedtime.   Yes Historical Provider, MD   aspirin 81 mg EC tablet Take 1 tablet (81 mg) by mouth every other day.   Yes Historical Provider, MD   atorvastatin (Lipitor) 20 mg tablet Take 1  tablet (20 mg) by mouth once daily.   Yes Historical Provider, MD   ergocalciferol (Vitamin D-2) 1.25 MG (58282 UT) capsule Take 1 capsule (1,250 mcg) by mouth 1 (one) time per week. 4/12/24  Yes Historical Provider, MD   gabapentin (Neurontin) 300 mg capsule Take 1 capsule (300 mg) by mouth 2 times a day. 4/12/24  Yes Historical Provider, MD   HumScott GuoPen Insulin 100 unit/mL injection 20 Units 3 times daily (morning, midday, late afternoon). 4/12/24  Yes Historical Provider, MD   hydroCHLOROthiazide (HYDRODiuril) 25 mg tablet Take 1 tablet (25 mg) by mouth once daily.   Yes Historical Provider, MD   INSULIN LISPRO SUBQ Inject 15 Units under the skin once daily.   Yes Historical Provider, MD   isosorbide mononitrate ER (Imdur) 30 mg 24 hr tablet Take 1 tablet (30 mg) by mouth once daily.   Yes Historical Provider, MD   Lansubhash Solostar U-100 Insulin 100 unit/mL (3 mL) pen Inject 48 Units under the skin once daily at bedtime.   Yes Historical Provider, MD   latanoprost (Xalatan) 0.005 % ophthalmic solution Administer 1 drop into both eyes 2 times a day.   Yes Historical Provider, MD   losartan (Cozaar) 50 mg tablet Take 1 tablet (50 mg) by mouth once daily. 4/12/24  Yes Historical Provider, MD   metoprolol tartrate (Lopressor) 50 mg tablet Take 1.5 tablets by mouth 2 times a day. 4/12/24  Yes Historical Provider, MD   Ozempic 0.25 mg or 0.5 mg (2 mg/3 mL) pen injector 0.25 mg 1 (one) time per week. 12/11/23  Yes Historical Provider, MD   rosuvastatin (Crestor) 40 mg tablet Take 1 tablet (40 mg) by mouth once daily at bedtime. 4/12/24  Yes Historical Provider, MD   verapamil (Calan) 120 mg tablet Take 1 tablet (120 mg) by mouth 3 times a day. 4/12/24  Yes Historical Provider, MD   zolpidem (Ambien) 10 mg tablet Take 1 tablet (10 mg) by mouth once daily at bedtime. 8/25/24  Yes Historical Provider, MD   chlorhexidine (Hibiclens) 4 % external liquid Apply 1 Application topically once daily for 5 days. Use per CPM/PAT  provided instructions 9/9/24 9/14/24  Priscilla Hebert MD   chlorhexidine (Peridex) 0.12 % solution Use 15 mL in the mouth or throat once daily for 2 doses. 9/9/24 9/11/24  Priscilla Hebert MD        PAT ROS:   Constitutional:    fever   chills  Neuro/Psych:    numbness   weakness  Eyes:   Ears:   Nose:   Mouth:   Throat:   neg    Neck:   neg    Cardio:    dyspnea  Respiratory:    cough   wheezing   shortness of breath  Endocrine:   GI:   neg    :   Musculoskeletal:    arthralgias   myalgias  Hematologic:   neg    Skin:      Physical Exam  Vitals and nursing note reviewed.   Constitutional:       Appearance: Normal appearance. She is obese.   HENT:      Head: Normocephalic and atraumatic.   Eyes:      Extraocular Movements: Extraocular movements intact.      Conjunctiva/sclera: Conjunctivae normal.      Pupils: Pupils are equal, round, and reactive to light.   Cardiovascular:      Rate and Rhythm: Normal rate and regular rhythm.      Pulses: Normal pulses.      Heart sounds: Normal heart sounds.   Pulmonary:      Breath sounds: Wheezing present.   Musculoskeletal:         General: Tenderness present.      Cervical back: Normal range of motion.   Skin:     General: Skin is warm and dry.   Neurological:      General: No focal deficit present.      Mental Status: She is alert and oriented to person, place, and time. Mental status is at baseline.          PAT AIRWAY:   Airway:     Mallampati::  III    TM distance::  >3 FB    Neck ROM::  Full  normal        Visit Vitals  /74   Pulse 68   Temp 36.2 °C (97.2 °F) (Temporal)       DASI Risk Score      Flowsheet Row Most Recent Value   DASI SCORE 0   METS Score (Will be calculated only when all the questions are answered) 2.7          Caprini DVT Assessment      Flowsheet Row Most Recent Value   DVT Score 10   Current Status Major surgery planned, lasting over 3 hours   Age 60-75 years   BMI 31-40 (Obesity)          Modified Frailty Index    No data to display        CHADS2 Stroke Risk  Current as of 6 minutes ago        N/A 3 to 100%: High Risk   2 to < 3%: Medium Risk   0 to < 2%: Low Risk     Last Change: N/A          This score determines the patient's risk of having a stroke if the patient has atrial fibrillation.        This score is not applicable to this patient. Components are not calculated.          Revised Cardiac Risk Index      Flowsheet Row Most Recent Value   Revised Cardiac Risk Calculator 2          Apfel Simplified Score      Flowsheet Row Most Recent Value   Apfel Simplified Score Calculator 3          Risk Analysis Index Results This Encounter    No data found in the last 1 encounters.       Stop Bang Score      Flowsheet Row Most Recent Value   Do you snore loudly? 0   Do you often feel tired or fatigued after your sleep? 0   Has anyone ever observed you stop breathing in your sleep? 0   Do you have or are you being treated for high blood pressure? 0   Recent BMI (Calculated) 35.5   Is BMI greater than 35 kg/m2? 1=Yes   Age older than 50 years old? 1=Yes   Is your neck circumference greater than 17 inches (Male) or 16 inches (Female)? 0   Gender - Male 0=No   STOP-BANG Total Score 2            .No results found for this or any previous visit (from the past 24 hour(s)).     Assessment and Plan:    69 y.o.  female  scheduled for T10-T11 laminectomy on 9/12 with Dr. Lopez for spinal stenosis.  PMHX includes asthma, HTN, HLD, HTN, MI (medically treated), spinal stenosis PVCs.  Presents to CPM today for perioperative risk stratification and optimization.  Patient seen last week by PCP for cough, flu like symptoms. Patient started on course of Azithromycin. Today patient has ongoing fever/chills, SOB, fatigue and productive cough. Patient states her symptoms are not improving with the current antibiotic. On examination patient has bilateral wheezing with productive cough and ongoing fever and chills. After discussion with Dr. Hebert, Dr. Lopez it was  recommended to admit the patient for treatrment/optimization for surgical procedure.       Neuro:  Spinal Stenosis with cord compression   - Sagittal imaging shows a very severe spinal stenosis at T10-11 level causing severe spinal cord compression intramedullary signal change in the spinal cord at that level.   - Bilateral lower extremity weakness.   - Urinary incontinence    Patient is at increased risk for perioperative CVA secondary to  cardiac disease, HTN, DM, operative time > 2.5 hours    Patient scheduled for surgery this week 9/12. Dr. Lopez made ann    HEENT:  No HEENT diagnosis or significant findings on chart review or clinical presentation and evaluation. No further preoperative testing/intervention indicated at this time.    Cardiovascular:  Hypertension   - well controlled with current medical therapy   - Continue Losartan. Do not take ACE/ARB on day of surgery    HLD  - Continue with Statin     MI   - Continue ASA, Statin, Metoprolol.   - Nuclear Stress Test 9/6/24 at [place] showed no reversible perfusion defects to suggest ischemia. EF 58%.   - Follows with [facility].    PVCs (asymptomatic)  - Continue with Verapamil, Metoprolol   - Follows with Cardiology, no recent medication changes      METS: 2.7  RCRI: 2 points, 10.1% risk for postoperative MACE   DEDRICK: 0.2% risk for 30 day postoperative MACE  EKG - NSR    Pulmonary:  No pulmonary diagnosis, however patient is at increased risk of perioperative complications secondary to  age > 60, obesity, respiratory infection within 4-6 weeks, functional dependence, major surgery, duration of surgery > 2 hours  Stop Bang score is 2 placing patient at moderate risk for CEE  ARISCAT: >45 points, 42.1% risk of in-hospital postoperative pulmonary complication  PRODIGY: Moderate risk for opioid induced respiratory depression    Patient presented to [facility] with active respiratory infection. Concern for pneumonia as patient has productive cough, wheezing  on examination. Patient admitted to hospital for optimization prior to surgery.     Discussed increased risk of pulmonary complications in the setting of active/recent respiratory infection. Discussed with primary team.     Renal:   No renal diagnosis or significant findings on chart review, clinical presentation or evaluation. No further preoperative testing/intervention is indicated at this time.    Pt at Low risk for perioperative MADI based on Dynamic Predictive Scoring Tool for Perioperative MADI     -CMP     Endocrine:  Type II DM  - Continue Insulin   - Half dose of basal long acting insulin the night prior to surgery     Hematologic:  No hematologic diagnosis, however patient is at an increased risk for DVT  Caprini Score 10, patient at High risk for perioperative DVT.  Patient provided with VTE education/handout.    - Patient currently on ASA 81mg   - Per [facility] to hold given hx of MI, CAD, HTN, HLD      Gastrointestinal:   No GI diagnosis or significant findings on chart review or clinical presentation and evaluation.   Eat-10 score 0  Apfel 0    Infectious disease:   No infectious diagnosis or significant findings on chart review or clinical presentation and evaluation.   Prescription provided for CHG body wash and dental rinse. CHG use instructions reviewed and provided to patient.    MRSA not collected, patient sent to ED.    Musculoskeletal:   Spinal Stenosis   - Cord compression with motor and sensory deficits.   - Urinary and bowel incontinence   - Planned surgical intervention     Anesthesia/Airway:  No anesthesia complications      Medication instructions and NPO guidelines reviewed with the patient.  All questions or concerns discussed and addressed.      Patient seen and staffed with Dr. Hebert

## 2024-09-09 NOTE — PREPROCEDURE INSTRUCTIONS
.    Thank you for visiting [facility] ([facility]) today for your pre-procedure evaluation, you were seen by Dr Ram    This summary includes instructions and information to aid you during your perioperative period.  Please read carefully. If you have any questions about your visit today, please call the number listed above.  If you become ill or have any changes to your health before your surgery, please contact your primary care provider and alert your surgeon.    Preparing for your Surgery       Exercises  Preoperative Deep Breathing Exercises  Why it is important to do deep breathing exercises before my surgery?  Deep breathing exercises strengthen your breathing muscles.  This helps you to recover after your surgery and decreases the chance of breathing complications.  How are the deep breathing exercises done?  Sit straight with your back supported.  Breathe in deeply and slowly through your nose. Your lower rib cage should expand and your abdomen may move forward.  Hold that breath for 3 to 5 seconds.  Breathe out through pursed lips, slowly and completely.  Rest and repeat 10 times every hour while awake.  Rest longer if you become dizzy or lightheaded.       Incentive Spirometer   You were provided with an incentive spirometer in [facility], please follow the below instructions.   You were not provided an incentive spirometer in [facility], please disregard the incentive spirometer instructions  What is an incentive spirometer?  An incentive spirometer is a device used before and after surgery to “exercise” your lungs.  It helps you to take deeper breaths to expand your lungs.  Below is an example of a basic incentive spirometer.  The device you receive may differ slightly but they all function the same.    Why do I need to use an incentive spirometer?  Using your incentive spirometer prepares your lungs for surgery and helps prevent lung problems after surgery.  How do I use my incentive  spirometer?  When you're using your incentive spirometer, make sure to breathe through your mouth. If you breathe through your nose, the incentive spirometer won't work properly. You can hold your nose if you have trouble.  If you feel dizzy at any time, stop and rest. Try again at a later time.  Follow the steps below:  Set up your incentive spirometer, expand the flexible tubing and connect to the outlet.  Sit upright in a chair or bed. Hold the incentive spirometer at eye level.   Put the mouthpiece in your mouth and close your lips tightly around it. Slowly breathe out (exhale) completely.  Breathe in (inhale) slowly through your mouth as deeply as you can. As you take a breath, you will see the piston rise inside the large column. While the piston rises, the indicator should move upwards. It should stay in between the 2 arrows (see Figure).  Try to get the piston as high as you can, while keeping the indicator between the arrows.   If the indicator doesn't stay between the arrows, you're breathing either too fast or too slow.  When you get it as high as you can, hold your breath for 10 seconds, or as long as possible. While you're holding your breath, the piston will slowly fall to the base of the spirometer.  Once the piston reaches the bottom of the spirometer, breathe out slowly through your mouth. Rest for a few seconds.  Repeat 10 times. Try to get the piston to the same level with each breath.  Repeat every hour while awake  You can carefully clean the outside of the mouthpiece with an alcohol wipe or soap and water.      Preoperative Brain Exercises    What are brain exercises?  A brain exercise is any activity that engages your thinking (cognitive) skills.    What types of activities are considered brain exercises?  Jigsaw puzzles, crossword puzzles, word jumble, memory games, word search, and many more.  Many can be found free online or on your phone via a mobile konrad.    Why should I do brain exercises  before my surgery?  More recent research has shown brain exercise before surgery can lower the risk of postoperative delirium (confusion) which can be especially important for older adults.  Patients who did brain exercises for 5 to 10 hours the days before surgery, cut their risk of postoperative delirium in half up to 1 week after surgery.    Sit-to-Stand Exercise    What is the sit-to-stand exercise?  The sit-to-stand exercise strengthens the muscles of your lower body and muscles in the center of your body (core muscles for stability) helping to maintain and improve your strength and mobility.  How do I do the sit-to-stand exercise?  The goal is to do this exercise without using your arms or hands.  If this is too difficult, use your arms and hands or a chair with armrests to help slowly push yourself to the standing position and lower yourself back to the sitting position. As the movement becomes easier use your arms and hands less.    Steps to the sit-to-stand exercise  Sit up tall in a sturdy chair, knees bent, feet flat on the floor shoulder-width apart.  Shift your hips/pelvis forward in the chair to correctly position yourself for the next movement.  Lean forward at your hips.  Stand up straight putting equal weight on both feet.  Check to be sure you are properly aligned with the chair, in a slow controlled movement sit back down.  Repeat this exercise 10-15 times.  If needed you can do it fewer times until your strength improves.  Rest for 1 minute.  Do another 10-15 sit-to-stand exercises.  Try to do this in the morning and evening.        Instructions    Preoperative Fasting Guidelines    Why must I stop eating and drinking near surgery time?  With sedation, food or liquid in your stomach can enter your lungs causing serious complications  Food can increase nausea and vomiting  When do I need to stop eating and drinking before my surgery?      Do not eat any food or drink any liquids after midnight the  night before your surgery/procedure.  You may have sips of water to take medications.            Simple things you can do to help prevent blood clots     Blood clots are blockages that can form in the body's veins. When a blood clot forms in your deep veins, it may be called a deep vein thrombosis, or DVT for short. Blood clots can happen in any part of the body where blood flows, but they are most common in the arms and legs. If a piece of a blood clot breaks free and travels to the lungs, it is called a pulmonary embolus (PE). A PE can be a very serious problem.         Being in the hospital or having surgery can raise your chances of getting a blood clot because you may not be well enough to move around as much as you normally do.         Ways you can help prevent blood clots in the hospital       Wearing SCDs  SCDs stands for Sequential Compression Devices.   SCDs are special sleeves that wrap around your legs. They attach to a pump that fills them with air to gently squeeze your legs every few minutes.  This helps return the blood in your legs to your heart.   SCDs should only be taken off when walking or bathing. SCDs may not be comfortable, but they can help save your life.              Pump SCD leg sleeves  Wearing compression stockings - if your doctor orders them. These special snug-fitting stockings gently squeeze your legs to help blood flow.       Walking. Walking helps move the blood in your legs.   If your doctor says it is ok, try walking the halls at least   5 times a day. Ask us to help you get up, so you don't fall.      Taking any blood-thinning medicines your doctor orders.              Ways you can help prevent blood clots at home         Wearing compression stockings - if your doctor orders them.   Walking - to help move the blood in your legs.    Taking any blood-thinning medicines your doctor orders.      Signs of a blood clot or PE    Tell your doctor or nurse right away if you have any of  "the problems listed below.         If you are at home, seek medical care right away. Call 911 for chest pain or problems breathing.            Signs of a blood clot (DVT) - such as pain, swelling, redness, or warmth in your arm or legs.  Signs of a pulmonary embolism (PE) - such as chest pain or feeling short of breath      Tobacco and Alcohol;  Do not drink alcohol or smoke within 24 hours of surgery.  It is best to quit smoking for as long as possible before any surgery or procedure.         Other Instructions  Why did I have my nose, under my arms, and groin swabbed? The purpose of the swab is to identify Staphylococcus aureus inside your nose or on your skin.  The swab was sent to the laboratory for culture.  A positive swab/culture for Staphylococcus aureus is called colonization or carriage.   What is Staphylococcus aureus? Staphylococcus aureus, also known as \"staph\", is a germ found on the skin or in the nose of healthy people.  Sometimes Staphylococcus aureus can get into the body and cause an infection.  This can be minor (such as pimples, boils, or other skin problems).  It might also be serious (such as a blood infection, pneumonia, or a surgical site infection). What is Staphylococcus aureus colonization or carriage? Colonization or carriage means that a person has the germ but is not sick from it.  These bacteria can be spread on the hands or when breathing or sneezing. How is Staphylococcus aureus spread? It is most often spread by close contact with a person or item that carries it. What happens if my culture is positive for Staphylococcus aureus? Your doctor/medical team will use this information to guide any antibiotic treatment which may be necessary.  Regardless of the culture results, we will clean the inside of your nose with a betadine swab just before you have your surgery. Will I get an infection if I have Staphylococcus aureus in my nose or on my skin? Anyone can get an infection with " Staphylococcus aureus.  However, the best way to reduce your risk of infection is to follow the instructions provided to you for the use of your CHG soap and dental rinse.  , Body Wash; What is a home preoperative antibacterial shower? This shower is a way of cleaning the skin with a germ-killing solution before surgery.  The solution contains chlorhexidine, commonly known as CHG.  CHG is a skin cleanser with germ-killing ability.  Let your doctor know if you are allergic to chlorhexidine. Why do I need to take a preoperative antibacterial shower? Skin is not sterile.  It is best to try to make your skin as free of germs as possible before surgery.  Proper cleansing with a germ-killing soap before surgery can lower the number of germs on your skin.  This helps to reduce the risk of infection at the surgical site.  Following the instructions listed below will help you prepare your skin for surgery.   How do I use the solution? Steps:  Begin using your CHG soap 5 days before your scheduled surgery on ___________.   First, wash and rinse your hair using the CHG soap. Keep CHG soap away from ear canals and eyes.  Rinse completely, do not condition.  Hair extensions should be removed. , Oral/Dental Rinse: What is oral/dental rinse?  It is a mouthwash. It is a way of cleaning the mouth with a germ-killing solution before your surgery.  The solution contains chlorhexidine, commonly known as CHG. It is used inside the mouth to kill a bacteria known as Staphylococcus aureus.  Let your doctor know if you are allergic to Chlorhexidine. Why do I need to use CHG oral/dental rinse? The CHG oral/dental rinse helps to kill a bacteria in your mouth known as Staphylococcus aureus.  This reduces the risk of infection at the surgical site.  Using your CHG oral/dental rinse STEPS: Use your CHG oral/dental rinse after you brush your teeth the night before (at bedtime) and the morning of your surgery.  Follow all directions on your  prescription label.  Use the cap on the container to measure 15 ml.  Swish (gargle if you can) the mouthwash in your mouth for at least 30 seconds, (do not swallow) and spit out.  After you use your CHG rinse, do not rinse your mouth with water, drink or eat.  Please refer to the prescription label for the appropriate time to resume oral intake What side effects might I have using the CHG oral/dental rinse? CHG rinse will stick to plaque on the teeth.  Brush and floss just before use.  Teeth brushing will help avoid staining of plaque during use.           The Week before Surgery        Seven days before Surgery  Check your [facility] medication instructions  Do the exercises provided to you by [facility]   Arrange for a responsible, adult licensed  to take you home after surgery and stay with you for 24 hours.  You will not be permitted to drive yourself home if you have received any anesthetic/sedation  Six days before surgery  Check your [facility] medication instructions  Do the exercises provided to you by [facility]   Start using Chlorhexidene (CHG) body wash if prescribed  Five days before surgery  Check your [facility] medication instructions  Do the exercises provided to you by [facility]   Continue to use CHG body wash if prescribed  Three days before surgery  Check your [facility] medication instructions  Do the exercises provided to you by [facility]   Continue to use CHG body wash if prescribed  Two days before surgery  Check your [facility] medication instructions  Do the exercises provided to you by [facility]   Continue to use CHG body wash if prescribed    The Day before Surgery       Check your [facility] medication and all other [facility] instructions including when to stop eating and drinking  You will be called with your arrival time for surgery in the late afternoon.  If you do not receive a call please reach out to your surgeon's office.  Do not smoke or drink 24 hours before surgery  Prepare items to bring with you to the hospital  Shower with your chlorhexidine wash  if prescribed  Brush your teeth and use your chlorhexidine dental rinse if prescribed    The Day of Surgery       Check your [facility] medication instructions  Ensure you follow the instructions for when to stop eating and drinking  Shower, if prescribed use CHG.  Do not apply any lotions, creams, moisturizers, perfume or deodorant  Brush your teeth and use your CHG dental rinse if prescribed  Wear loose comfortable clothing  Avoid make-up  Remove  jewelry and piercings, consider professional piercing removal with a plastic spacer if needed  Bring photo ID and Insurance card  Bring an accurate medication list that includes medication dose, frequency and allergies  Bring a copy of your advanced directives (will, health care power of )  Bring any devices and controllers as well as medical devices you have been provided with for surgery (CPAP, slings, braces, etc.)  Dentures, eyeglasses, and contacts will be removed before surgery, please bring cases for contacts or glasses           Medication List            Accurate as of September 9, 2024 11:19 AM. Always use your most recent med list.                albuterol 90 mcg/actuation inhaler  Medication Adjustments for Surgery: Take/Use as prescribed     amitriptyline 10 mg tablet  Commonly known as: Elavil  Medication Adjustments for Surgery: Take/Use as prescribed     aspirin 81 mg EC tablet     atorvastatin 20 mg tablet  Commonly known as: Lipitor  Medication Adjustments for Surgery: Take/Use as prescribed     * chlorhexidine 4 % external liquid  Commonly known as: Hibiclens  Apply 1 Application topically once daily for 5 days. Use per [facility] provided instructions     * chlorhexidine 0.12 % solution  Commonly known as: Peridex  Use 15 mL in the mouth or throat once daily for 2 doses.     ergocalciferol 1.25 MG (09046 UT) capsule  Commonly known as: Vitamin D-2  Additional Medication Adjustments for Surgery: Take last dose 7 days before surgery     gabapentin 300 mg  capsule  Commonly known as: Neurontin  Medication Adjustments for Surgery: Take/Use as prescribed     hydroCHLOROthiazide 25 mg tablet  Commonly known as: HYDRODiuril  Medication Adjustments for Surgery: Take/Use as prescribed     * INSULIN LISPRO SUBQ     * HumaLOG KwikPen Insulin 100 unit/mL injection  Generic drug: insulin lispro     isosorbide mononitrate ER 30 mg 24 hr tablet  Commonly known as: Imdur  Medication Adjustments for Surgery: Take/Use as prescribed     Lantus Solostar U-100 Insulin 100 unit/mL (3 mL) pen  Generic drug: insulin glargine     latanoprost 0.005 % ophthalmic solution  Commonly known as: Xalatan  Medication Adjustments for Surgery: Take/Use as prescribed     losartan 50 mg tablet  Commonly known as: Cozaar  Medication Adjustments for Surgery: Take last dose 1 day (24 hours) before surgery     metoprolol tartrate 50 mg tablet  Commonly known as: Lopressor  Medication Adjustments for Surgery: Take/Use as prescribed     Ozempic 0.25 mg or 0.5 mg (2 mg/3 mL) pen injector  Generic drug: semaglutide  Additional Medication Adjustments for Surgery: Other (Comment)  Notes to patient: Take last dose on 9/4.  You must be off off this medication for a minimum of a full 7 days before the day of surgery     rosuvastatin 40 mg tablet  Commonly known as: Crestor  Medication Adjustments for Surgery: Take/Use as prescribed     verapamil 120 mg tablet  Commonly known as: Calan  Medication Adjustments for Surgery: Take/Use as prescribed     zolpidem 10 mg tablet  Commonly known as: Ambien  Medication Adjustments for Surgery: Take/Use as prescribed           * This list has 4 medication(s) that are the same as other medications prescribed for you. Read the directions carefully, and ask your doctor or other care provider to review them with you.

## 2024-09-09 NOTE — H&P
"History Of Present Illness  Abbie Mayo is a 69 y.o. female with a history of asthma, HTN, HLD, prior MI, DM, progressive thoracic myelopathy, now wheelchair bound who presents with shortness of breath prior to anticipated surgery 9/12.    Patient reports productive cough for 3 weeks that has been refractory to her inhaler and a course of azithromycin. She has had prior asthma exacerbations.     Past Medical History  Past Medical History:   Diagnosis Date    Anxiety     Asthma ([facility])     Complaining of cough     9/4/24-- c/o cough with phelgm    Coronary artery disease     History of chronic ischemic heart disease     Hyperlipidemia     Hypertension     Lumbar radiculopathy     Myocardial infarction ([facility])     Several years ago    Pseudoclaudication syndrome     Shortness of breath     Spinal stenosis     spinal stenosis in the thoracic spine    Type 2 diabetes mellitus ([facility])     Poorly controlled       Surgical History  Past Surgical History:   Procedure Laterality Date    CARDIAC CATHETERIZATION      CATARACT EXTRACTION      CHOLECYSTECTOMY      COLONOSCOPY      IRIDOTOMY / IRIDECTOMY      TRABECULECTOMY        Social History  She reports that she has never smoked. She has never used smokeless tobacco. No history on file for alcohol use and drug use.    Family History  No family history on file.     Allergies  Suvorexant    Review of Systems     Physical Exam  EXAM    Constitutional: No acute distress  Resp: breathing comfortably  Cardio: well perfused  GI: nondistended  MSK: full range of motion  Neuro: Awake, Ox3, face symmetric  Ox3  BUE 5/5  RLE HF4 KE/PF/DF4+  LLE HF3 KE4 PF/DF4+  Psych: appropriate  Skin: no obvious lesions       Last Recorded Vitals  Blood pressure 132/81, pulse 85, temperature 36.5 °C (97.7 °F), temperature source Temporal, resp. rate 16, height 1.676 m (5' 6\"), weight 99.8 kg (220 lb), SpO2 98%.     Assessment/Plan   Assessment & Plan    Abbie Mayo is a 69 y.o. female with a " history of asthma, HTN, HLD, prior MI, DM, progressive thoracic myelopathy, now wheelchair bound who presents with shortness of breath prior to anticipated surgery 9/12.    Patient reports productive cough for 3 weeks that has been refractory to her inhaler and a course of azithromycin. She has had prior asthma exacerbations.      Planning for T10-T11 laminectomy facetectomy with T10 -11 posterior nonsegmental instrumented fusion using local bone autograft and DBM along with L2-3 minimally invasive laminectomy with the use of operative microscope  on 9/12  Will consult medicine for perioperative clearance

## 2024-09-10 LAB
APPEARANCE UR: ABNORMAL
APTT PPP: 28 SECONDS (ref 27–38)
ATRIAL RATE: 95 BPM
BACTERIA #/AREA URNS AUTO: ABNORMAL /HPF
BASOPHILS # BLD AUTO: 0.04 X10*3/UL (ref 0–0.1)
BASOPHILS NFR BLD AUTO: 0.7 %
BILIRUB UR STRIP.AUTO-MCNC: NEGATIVE MG/DL
CAOX CRY #/AREA UR COMP ASSIST: ABNORMAL /HPF
COLOR UR: YELLOW
EOSINOPHIL # BLD AUTO: 0.17 X10*3/UL (ref 0–0.7)
EOSINOPHIL NFR BLD AUTO: 3 %
ERYTHROCYTE [DISTWIDTH] IN BLOOD BY AUTOMATED COUNT: 12.4 % (ref 11.5–14.5)
EST. AVERAGE GLUCOSE BLD GHB EST-MCNC: 258 MG/DL
GLUCOSE BLD MANUAL STRIP-MCNC: 137 MG/DL (ref 74–99)
GLUCOSE BLD MANUAL STRIP-MCNC: 380 MG/DL (ref 74–99)
GLUCOSE BLD MANUAL STRIP-MCNC: 414 MG/DL (ref 74–99)
GLUCOSE UR STRIP.AUTO-MCNC: ABNORMAL MG/DL
HBA1C MFR BLD: 10.6 %
HCT VFR BLD AUTO: 38.9 % (ref 36–46)
HGB BLD-MCNC: 12.1 G/DL (ref 12–16)
IGE SERPL-ACNC: 6 IU/ML (ref 0–214)
IMM GRANULOCYTES # BLD AUTO: 0.02 X10*3/UL (ref 0–0.7)
IMM GRANULOCYTES NFR BLD AUTO: 0.4 % (ref 0–0.9)
INR PPP: 1 (ref 0.9–1.1)
KETONES UR STRIP.AUTO-MCNC: ABNORMAL MG/DL
LEUKOCYTE ESTERASE UR QL STRIP.AUTO: ABNORMAL
LYMPHOCYTES # BLD AUTO: 2.26 X10*3/UL (ref 1.2–4.8)
LYMPHOCYTES NFR BLD AUTO: 40.4 %
MCH RBC QN AUTO: 27 PG (ref 26–34)
MCHC RBC AUTO-ENTMCNC: 31.1 G/DL (ref 32–36)
MCV RBC AUTO: 87 FL (ref 80–100)
MONOCYTES # BLD AUTO: 0.33 X10*3/UL (ref 0.1–1)
MONOCYTES NFR BLD AUTO: 5.9 %
MUCOUS THREADS #/AREA URNS AUTO: ABNORMAL /LPF
NEUTROPHILS # BLD AUTO: 2.78 X10*3/UL (ref 1.2–7.7)
NEUTROPHILS NFR BLD AUTO: 49.6 %
NITRITE UR QL STRIP.AUTO: ABNORMAL
NRBC BLD-RTO: 0 /100 WBCS (ref 0–0)
P AXIS: 46 DEGREES
P OFFSET: 189 MS
P ONSET: 131 MS
PH UR STRIP.AUTO: 5.5 [PH]
PLATELET # BLD AUTO: 212 X10*3/UL (ref 150–450)
PR INTERVAL: 194 MS
PROT UR STRIP.AUTO-MCNC: ABNORMAL MG/DL
PROTHROMBIN TIME: 11 SECONDS (ref 9.8–12.8)
Q ONSET: 228 MS
QRS COUNT: 16 BEATS
QRS DURATION: 70 MS
QT INTERVAL: 338 MS
QTC CALCULATION(BAZETT): 424 MS
QTC FREDERICIA: 393 MS
R AXIS: 21 DEGREES
RBC # BLD AUTO: 4.48 X10*6/UL (ref 4–5.2)
RBC # UR STRIP.AUTO: NEGATIVE /UL
RBC #/AREA URNS AUTO: ABNORMAL /HPF
SP GR UR STRIP.AUTO: 1.03
SQUAMOUS #/AREA URNS AUTO: ABNORMAL /HPF
STAPHYLOCOCCUS SPEC CULT: NORMAL
T AXIS: 2 DEGREES
T OFFSET: 397 MS
UROBILINOGEN UR STRIP.AUTO-MCNC: NORMAL MG/DL
VENTRICULAR RATE: 95 BPM
WBC # BLD AUTO: 5.4 X10*3/UL (ref 4.4–11.3)
WBC #/AREA URNS AUTO: >50 /HPF

## 2024-09-10 PROCEDURE — 85610 PROTHROMBIN TIME: CPT

## 2024-09-10 PROCEDURE — 2500000001 HC RX 250 WO HCPCS SELF ADMINISTERED DRUGS (ALT 637 FOR MEDICARE OP): Performed by: ANESTHESIOLOGY

## 2024-09-10 PROCEDURE — 82947 ASSAY GLUCOSE BLOOD QUANT: CPT

## 2024-09-10 PROCEDURE — 1200000002 HC GENERAL ROOM WITH TELEMETRY DAILY

## 2024-09-10 PROCEDURE — 94640 AIRWAY INHALATION TREATMENT: CPT

## 2024-09-10 PROCEDURE — 2500000002 HC RX 250 W HCPCS SELF ADMINISTERED DRUGS (ALT 637 FOR MEDICARE OP, ALT 636 FOR OP/ED): Performed by: ANESTHESIOLOGY

## 2024-09-10 PROCEDURE — 36415 COLL VENOUS BLD VENIPUNCTURE: CPT

## 2024-09-10 PROCEDURE — 81001 URINALYSIS AUTO W/SCOPE: CPT

## 2024-09-10 PROCEDURE — 2500000001 HC RX 250 WO HCPCS SELF ADMINISTERED DRUGS (ALT 637 FOR MEDICARE OP)

## 2024-09-10 PROCEDURE — 2500000004 HC RX 250 GENERAL PHARMACY W/ HCPCS (ALT 636 FOR OP/ED): Performed by: ANESTHESIOLOGY

## 2024-09-10 PROCEDURE — 2500000002 HC RX 250 W HCPCS SELF ADMINISTERED DRUGS (ALT 637 FOR MEDICARE OP, ALT 636 FOR OP/ED)

## 2024-09-10 PROCEDURE — 2500000004 HC RX 250 GENERAL PHARMACY W/ HCPCS (ALT 636 FOR OP/ED)

## 2024-09-10 RX ORDER — ROSUVASTATIN CALCIUM 20 MG/1
40 TABLET, COATED ORAL NIGHTLY
Status: DISCONTINUED | OUTPATIENT
Start: 2024-09-10 | End: 2024-09-13 | Stop reason: HOSPADM

## 2024-09-10 RX ORDER — ZOLPIDEM TARTRATE 5 MG/1
10 TABLET ORAL NIGHTLY
Status: DISCONTINUED | OUTPATIENT
Start: 2024-09-10 | End: 2024-09-13 | Stop reason: HOSPADM

## 2024-09-10 RX ORDER — INSULIN GLARGINE 100 [IU]/ML
48 INJECTION, SOLUTION SUBCUTANEOUS NIGHTLY
Status: DISCONTINUED | OUTPATIENT
Start: 2024-09-10 | End: 2024-09-13 | Stop reason: HOSPADM

## 2024-09-10 RX ORDER — MONTELUKAST SODIUM 10 MG/1
10 TABLET ORAL NIGHTLY
Status: DISCONTINUED | OUTPATIENT
Start: 2024-09-10 | End: 2024-09-13 | Stop reason: HOSPADM

## 2024-09-10 RX ORDER — HYDROCHLOROTHIAZIDE 25 MG/1
25 TABLET ORAL DAILY
Status: DISCONTINUED | OUTPATIENT
Start: 2024-09-10 | End: 2024-09-13 | Stop reason: HOSPADM

## 2024-09-10 RX ORDER — ERGOCALCIFEROL 1.25 MG/1
1250 CAPSULE ORAL
Status: DISCONTINUED | OUTPATIENT
Start: 2024-09-15 | End: 2024-09-13 | Stop reason: HOSPADM

## 2024-09-10 RX ORDER — IPRATROPIUM BROMIDE AND ALBUTEROL SULFATE 2.5; .5 MG/3ML; MG/3ML
3 SOLUTION RESPIRATORY (INHALATION)
Status: DISCONTINUED | OUTPATIENT
Start: 2024-09-10 | End: 2024-09-12

## 2024-09-10 RX ORDER — LOSARTAN POTASSIUM 50 MG/1
50 TABLET ORAL DAILY
Status: DISCONTINUED | OUTPATIENT
Start: 2024-09-10 | End: 2024-09-13 | Stop reason: HOSPADM

## 2024-09-10 RX ORDER — PANTOPRAZOLE SODIUM 40 MG/1
40 TABLET, DELAYED RELEASE ORAL
Status: DISCONTINUED | OUTPATIENT
Start: 2024-09-11 | End: 2024-09-13 | Stop reason: HOSPADM

## 2024-09-10 RX ORDER — ALBUTEROL SULFATE 90 UG/1
2 INHALANT RESPIRATORY (INHALATION) EVERY 6 HOURS PRN
Status: DISCONTINUED | OUTPATIENT
Start: 2024-09-10 | End: 2024-09-13 | Stop reason: HOSPADM

## 2024-09-10 RX ORDER — INSULIN LISPRO 100 [IU]/ML
20 INJECTION, SOLUTION INTRAVENOUS; SUBCUTANEOUS
Status: DISCONTINUED | OUTPATIENT
Start: 2024-09-10 | End: 2024-09-12

## 2024-09-10 RX ORDER — SULFAMETHOXAZOLE AND TRIMETHOPRIM 800; 160 MG/1; MG/1
1 TABLET ORAL EVERY 12 HOURS SCHEDULED
Status: DISCONTINUED | OUTPATIENT
Start: 2024-09-10 | End: 2024-09-13 | Stop reason: HOSPADM

## 2024-09-10 RX ORDER — VERAPAMIL HYDROCHLORIDE 120 MG/1
120 TABLET, FILM COATED ORAL 3 TIMES DAILY
Status: DISCONTINUED | OUTPATIENT
Start: 2024-09-10 | End: 2024-09-13 | Stop reason: HOSPADM

## 2024-09-10 ASSESSMENT — COGNITIVE AND FUNCTIONAL STATUS - GENERAL
TURNING FROM BACK TO SIDE WHILE IN FLAT BAD: A LOT
MOBILITY SCORE: 9
MOVING FROM LYING ON BACK TO SITTING ON SIDE OF FLAT BED WITH BEDRAILS: A LITTLE
CLIMB 3 TO 5 STEPS WITH RAILING: TOTAL
MOVING TO AND FROM BED TO CHAIR: TOTAL
WALKING IN HOSPITAL ROOM: TOTAL
STANDING UP FROM CHAIR USING ARMS: TOTAL

## 2024-09-10 ASSESSMENT — PAIN SCALES - GENERAL
PAINLEVEL_OUTOF10: 0 - NO PAIN
PAINLEVEL_OUTOF10: 9
PAINLEVEL_OUTOF10: 8
PAINLEVEL_OUTOF10: 5 - MODERATE PAIN
PAINLEVEL_OUTOF10: 8
PAINLEVEL_OUTOF10: 6
PAINLEVEL_OUTOF10: 8
PAINLEVEL_OUTOF10: 5 - MODERATE PAIN

## 2024-09-10 ASSESSMENT — PAIN - FUNCTIONAL ASSESSMENT: PAIN_FUNCTIONAL_ASSESSMENT: 0-10

## 2024-09-10 ASSESSMENT — PAIN DESCRIPTION - ORIENTATION: ORIENTATION: RIGHT;LEFT

## 2024-09-10 ASSESSMENT — PAIN DESCRIPTION - LOCATION
LOCATION: LEG
LOCATION: LEG

## 2024-09-10 NOTE — SIGNIFICANT EVENT
Significant Event     VBG collected at 16:18 (9/10) unable to be uploaded to chart from lab. Please view results below    pH 7.40,   pCO2 34,   pO2 61,   Na+ 138,   K+ 3.9,   Cl- 108,   Ca++ 1.05,   glu 167,   lac 2.0,   tHb 12.1,   O2Hb 89.2,   SO2 91.2,   BE (B) -3.1,   AG 13,   HCO3 (C) 21.1,   Hct (C) 36

## 2024-09-10 NOTE — PROGRESS NOTES
"Abbie Mayo is a 69 y.o. female on day 1 of admission presenting with Thoracic myelopathy.    Subjective   NAEO       Objective     Physical Exam  Awake, Ox3, face symmetric  Ox3  BUE 5/5  RLE HF4 KE/PF/DF4+  LLE HF3 KE4 PF/DF4+    Last Recorded Vitals  Blood pressure (!) 177/92, pulse (!) 104, temperature 37.2 °C (99 °F), temperature source Oral, resp. rate 18, height 1.676 m (5' 6\"), weight 99.8 kg (220 lb), SpO2 95%.  Intake/Output last 3 Shifts:  No intake/output data recorded.    Relevant Results                   Results for orders placed or performed during the hospital encounter of 09/09/24 (from the past 24 hour(s))   Influenza A, and B PCR   Result Value Ref Range    Flu A Result Not Detected Not Detected    Flu B Result Not Detected Not Detected   Sars-CoV-2 PCR   Result Value Ref Range    Coronavirus 2019, PCR Not Detected Not Detected   Blood Gas Venous Full Panel   Result Value Ref Range    POCT pH, Venous 7.35 7.33 - 7.43 pH    POCT pCO2, Venous 54 (H) 41 - 51 mm Hg    POCT pO2, Venous 35 35 - 45 mm Hg    POCT SO2, Venous 50 45 - 75 %    POCT Oxy Hemoglobin, Venous 49.3 45.0 - 75.0 %    POCT Hematocrit Calculated, Venous 39.0 36.0 - 46.0 %    POCT Sodium, Venous 139 136 - 145 mmol/L    POCT Potassium, Venous 4.2 3.5 - 5.3 mmol/L    POCT Chloride, Venous 103 98 - 107 mmol/L    POCT Ionized Calicum, Venous 1.16 1.10 - 1.33 mmol/L    POCT Glucose, Venous 275 (H) 74 - 99 mg/dL    POCT Lactate, Venous 1.6 0.4 - 2.0 mmol/L    POCT Base Excess, Venous 3.0 -2.0 - 3.0 mmol/L    POCT HCO3 Calculated, Venous 29.8 (H) 22.0 - 26.0 mmol/L    POCT Hemoglobin, Venous 13.1 12.0 - 16.0 g/dL    POCT Anion Gap, Venous 10.0 10.0 - 25.0 mmol/L    Patient Temperature 37.0 degrees Celsius    FiO2 21 %   Troponin I, High Sensitivity   Result Value Ref Range    Troponin I, High Sensitivity (CMC) 18 0 - 34 ng/L   Comprehensive Metabolic Panel   Result Value Ref Range    Glucose 256 (H) 74 - 99 mg/dL    Sodium 142 136 - 145 " mmol/L    Potassium 4.0 3.5 - 5.3 mmol/L    Chloride 103 98 - 107 mmol/L    Bicarbonate 28 21 - 32 mmol/L    Anion Gap 15 10 - 20 mmol/L    Urea Nitrogen 15 6 - 23 mg/dL    Creatinine 0.80 0.50 - 1.05 mg/dL    eGFR 80 >60 mL/min/1.73m*2    Calcium 9.1 8.6 - 10.6 mg/dL    Albumin 4.1 3.4 - 5.0 g/dL    Alkaline Phosphatase 126 33 - 136 U/L    Total Protein 6.9 6.4 - 8.2 g/dL    AST 15 9 - 39 U/L    Bilirubin, Total 0.6 0.0 - 1.2 mg/dL    ALT 18 7 - 45 U/L   CBC and Auto Differential   Result Value Ref Range    WBC 5.2 4.4 - 11.3 x10*3/uL    nRBC 0.0 0.0 - 0.0 /100 WBCs    RBC 4.67 4.00 - 5.20 x10*6/uL    Hemoglobin 12.7 12.0 - 16.0 g/dL    Hematocrit 38.9 36.0 - 46.0 %    MCV 83 80 - 100 fL    MCH 27.2 26.0 - 34.0 pg    MCHC 32.6 32.0 - 36.0 g/dL    RDW 12.6 11.5 - 14.5 %    Platelets 203 150 - 450 x10*3/uL    Neutrophils % 50.9 40.0 - 80.0 %    Immature Granulocytes %, Automated 0.4 0.0 - 0.9 %    Lymphocytes % 38.3 13.0 - 44.0 %    Monocytes % 7.3 2.0 - 10.0 %    Eosinophils % 2.5 0.0 - 6.0 %    Basophils % 0.6 0.0 - 2.0 %    Neutrophils Absolute 2.66 1.20 - 7.70 x10*3/uL    Immature Granulocytes Absolute, Automated 0.02 0.00 - 0.70 x10*3/uL    Lymphocytes Absolute 2.00 1.20 - 4.80 x10*3/uL    Monocytes Absolute 0.38 0.10 - 1.00 x10*3/uL    Eosinophils Absolute 0.13 0.00 - 0.70 x10*3/uL    Basophils Absolute 0.03 0.00 - 0.10 x10*3/uL   B-Type Natriuretic Peptide   Result Value Ref Range    BNP 85 0 - 99 pg/mL   POCT GLUCOSE   Result Value Ref Range    POCT Glucose 318 (H) 74 - 99 mg/dL                Assessment/Plan   Assessment & Plan  Thoracic myelopathy    Abbie Mayo is a 69 y.o. female with a history of asthma, HTN, HLD, prior MI, DM, progressive thoracic myelopathy, now wheelchair bound who presents with shortness of breath prior to anticipated surgery 9/12.      Plan   tele,   OR Thurs 9/12 T10-11 lami w/ fusion, L2-3 L2-3 MIS lami   sidagam c/s in AM for medical clearance/optimization  PTOT        Kathleen  MD Mary

## 2024-09-10 NOTE — PROGRESS NOTES
Pharmacy Medication History Review    Abbie Mayo is a 69 y.o. female admitted for Thoracic myelopathy. Pharmacy reviewed the patient's [id number] medications and allergies for accuracy.    The list below reflects the updated PTA list. Comments regarding how patient may be taking medications differently can be found in the Admit Orders Activity  Prior to Admission Medications   Prescriptions Informant Patient/Chart Reported?   HumaLOG KwikPen Insulin 100 unit/mL injection Self Yes   Sig: Inject 20 Units under the skin 3 times daily   (morning, midday, late afternoon). Take with meals   Lantus Solostar U-100 Insulin 100 unit/mL (3 mL) pen Self Yes   Sig: Inject 48 Units under the skin once daily at bedtime.   Ozempic 0.25 mg or 0.5 mg (2 mg/3 mL) pen injector Self Yes   Sig: Inject 0.25 mg under the skin 1 (one) time per week.   Every Sunday   albuterol 90 mcg/actuation inhaler Self PRN   Sig: Inhale 2 puffs every 6 hours if needed for wheezing or   shortness of breath.   ergocalciferol (Vitamin D-2) 1.25 MG (70115 UT) capsule Self Yes   Sig: Take 1 capsule (1,250 mcg) by mouth 1 (one) time per week.  Every Monday   gabapentin (Neurontin) 300 mg capsule Self See   Comment   Sig: Take 1 capsule (300 mg) by mouth 2 times a day.  **LF 6/11/24, 90 DS, #180**   hydroCHLOROthiazide (HYDRODiuril) 25 mg tablet Self Yes   Sig: Take 1 tablet (25 mg) by mouth once daily.   latanoprost (Xalatan) 0.005 % ophthalmic solution Self Yes   Sig: Administer 1 drop into the right eye 2 times a day.   losartan (Cozaar) 50 mg tablet Self Yes   Sig: Take 1 tablet (50 mg) by mouth once daily.   metFORMIN (Glucophage) 500 mg tablet Self See   Comment   Sig: Take 0.5 tablets (250 mg) by mouth 2 times daily   (morning and late afternoon).   metoprolol tartrate (Lopressor) 50 mg tablet Self Yes   Sig: Take 1.5 tablets by mouth 2 times a day.   rosuvastatin (Crestor) 40 mg tablet Self Yes   Sig: Take 1 tablet (40 mg) by mouth once  daily at bedtime.   verapamil (Calan) 120 mg tablet Self Yes   Sig: Take 1 tablet (120 mg) by mouth 3 times a day.   white petrolatum-mineral oiL (Tears Naturale PM) 94-3 % ophthalmic ointment Self PRN   Sig: Apply 1 Application to left eye if needed for dry eyes.   zolpidem (Ambien) 10 mg tablet Self Yes   Sig: Take 1 tablet (10 mg) by mouth once daily at bedtime.  **LF 8/25/24, 30 DS, #30**      Facility-Administered Medications: None        The list below reflects the updated allergy list. Please review each documented allergy for additional clarification and justification.  Allergies  Reviewed by Martin Catherine on 9/9/2024        Severity Reactions Comments    Suvorexant Medium Other Not sure            Patient declines M2B at discharge. Pharmacy has been updated to [facility] [phone].    Sources used to complete the med history include:  Patient interviewed about medications, alert, cooperative, pleasant, conversive, knew her medications and doses  Epic Dispense Report reviewed  Care Everywhere reviewed  OARRS   8/20/24  Office Visit & AVS reviewed    Below are additional concerns with the patient's PTA list.  **Patient was prescribed metformin 500 mg tablet twice daily but states she only takes 1/2 tablet twice daily because she heard bad things about using this medication**  **Patient states she sometimes takes Gabapentin 300 mg tablet 3 times daily when she needs too**    ---------------------------------  Martin Catherine CPhT  Transitions of Care Technician  Medication reconciliation complete  Please reach out via [employer] Chat for questions,   or if no response call [employer] or [email or web address].   [facility]

## 2024-09-10 NOTE — HOSPITAL COURSE
Abbie Mayo is a 69 year old female with a past medical history of asthma, HTN, HLD, prior MI, DM and progressive thoracic myelopathy, now wheelchair bound, who presented to the [facility] ED 9/9 with shortness of breath prior to anticipated surgery 9/12.  Admitted to Neurosurgery for further workup and possible OR planning.     9/10 Perioperative medicine consulted and recommended repeat TTE. RCRI 2, recommended further pulmonary optimization prior to proceeding with surgery.   9/11 Pulmonology consulted and ultimately recommended delaying surgery for at least 4 weeks to allow for resolution of respiratory symptoms. Surgery canceled with plans to reschedule as outpatient in 4-6 weeks.   9/12 Patient with hyperglycemia overnight 9/11-9/12 (500s) s/p lispro with improvement. Endocrine consulted recommended medication adjustments.         PT/OT evaluated patient and recommended ***     Patient discharged with detailed instructions and scheduled outpatient follow up.

## 2024-09-10 NOTE — CONSULTS
Consults    Reason For Consult  Preoperative evaluation     History Of Present Illness  Abbie Mayo is a 69 y.o. female with PMH of asthma, HTN, HLD, HTN, previous MI, severe spinal stenosis, PVCs. presenting for T10-T11 laminectomy facetectomy with T10 -11 posterior nonsegmental instrumented fusion with L2-3 minimally invasive laminectomy. Preoperative medicine consulted for preop evaluation.     Patient came to ED from PAT clinic with complains of SOB, wheezing, concern for viral pneumonia vs. Asthma exacerbation. Flu/Covid negative. No infectious process on xray. Patient treated with Solu-Medrol, magnesium, and 3 DuoNeb treatments for her shortness of breath in ED. Patient had normal troponin.     Patient with hx of MI. Follows up with cardiology at [facility]. States had a cardiac stress done test but not able to get the echo cardiologist had requested. Patient denies any current CP or symptoms of angina. She has SOB due to cough that she states she has had before with asthma exacerbations. Patient endorsing nocturnal awakening from asthma, using rescue inhaler more frequently and having to sleep on multiple pillows due to SOB. Also endorsing bilateral LE weakness and numbness.    Patient lives at home with her  and son. She is able to perform ADLs, walks with a walker from living to the bathroom but now is wheelchair bound. Patient states needs help to go up the 4 steps in front of her house, she is anxious when walking because she is afraid to fall. Patient states about 8 months ago, was able to walk without a walker and do minimal chores around the house.     Past Medical History  She has a past medical history of Anxiety, Asthma ([facility]), Complaining of cough, Coronary artery disease, History of chronic ischemic heart disease, Hyperlipidemia, Hypertension, Lumbar radiculopathy, Myocardial infarction (Multi), Pseudoclaudication syndrome, Shortness of breath, Spinal stenosis, and Type 2 diabetes  mellitus (Multi).    Surgical History  She has a past surgical history that includes Iridotomy / iridectomy; Cholecystectomy; Cataract extraction; Cardiac catheterization; and Colonoscopy.     Social History  She reports that she has never smoked. She has never used smokeless tobacco. She reports that she does not drink alcohol and does not use drugs.    Family History  No family history on file.     Allergies  Suvorexant    Review of Systems  As stated in HPI     Physical Exam  Physical Exam  General: She is not in acute distress.Resting comfortably   Neurological: AO x3. No focal deficits.   Cardiovascular: RRR, normal S1 and S2. No murmurs appreciated.  Pulmonary: Pulmonary effort is normal. No respiratory distress. Expiratory and inspiratory wheezing bilaterally   Abdominal: Abdomen is soft. There is no abdominal tenderness. + BS  Extremities: No edema appreciated. Weakness in bilateral lower extremities. BLE  Skin: warm, well perfused.   Psychiatric: Mood normal.        Last Recorded Vitals  BP (!) 176/102 (BP Location: Left arm, Patient Position: Sitting)   Pulse 100   Temp 37.1 °C (98.8 °F) (Temporal)   Resp 20   Wt 99.8 kg (220 lb)   SpO2 96%     Relevant Results  Results for orders placed or performed during the hospital encounter of 09/09/24 (from the past 24 hour(s))   Influenza A, and B PCR   Result Value Ref Range    Flu A Result Not Detected Not Detected    Flu B Result Not Detected Not Detected   Sars-CoV-2 PCR   Result Value Ref Range    Coronavirus 2019, PCR Not Detected Not Detected   Blood Gas Venous Full Panel   Result Value Ref Range    POCT pH, Venous 7.35 7.33 - 7.43 pH    POCT pCO2, Venous 54 (H) 41 - 51 mm Hg    POCT pO2, Venous 35 35 - 45 mm Hg    POCT SO2, Venous 50 45 - 75 %    POCT Oxy Hemoglobin, Venous 49.3 45.0 - 75.0 %    POCT Hematocrit Calculated, Venous 39.0 36.0 - 46.0 %    POCT Sodium, Venous 139 136 - 145 mmol/L    POCT Potassium, Venous 4.2 3.5 - 5.3 mmol/L    POCT  Chloride, Venous 103 98 - 107 mmol/L    POCT Ionized Calicum, Venous 1.16 1.10 - 1.33 mmol/L    POCT Glucose, Venous 275 (H) 74 - 99 mg/dL    POCT Lactate, Venous 1.6 0.4 - 2.0 mmol/L    POCT Base Excess, Venous 3.0 -2.0 - 3.0 mmol/L    POCT HCO3 Calculated, Venous 29.8 (H) 22.0 - 26.0 mmol/L    POCT Hemoglobin, Venous 13.1 12.0 - 16.0 g/dL    POCT Anion Gap, Venous 10.0 10.0 - 25.0 mmol/L    Patient Temperature 37.0 degrees Celsius    FiO2 21 %   Troponin I, High Sensitivity   Result Value Ref Range    Troponin I, High Sensitivity (CMC) 18 0 - 34 ng/L   Comprehensive Metabolic Panel   Result Value Ref Range    Glucose 256 (H) 74 - 99 mg/dL    Sodium 142 136 - 145 mmol/L    Potassium 4.0 3.5 - 5.3 mmol/L    Chloride 103 98 - 107 mmol/L    Bicarbonate 28 21 - 32 mmol/L    Anion Gap 15 10 - 20 mmol/L    Urea Nitrogen 15 6 - 23 mg/dL    Creatinine 0.80 0.50 - 1.05 mg/dL    eGFR 80 >60 mL/min/1.73m*2    Calcium 9.1 8.6 - 10.6 mg/dL    Albumin 4.1 3.4 - 5.0 g/dL    Alkaline Phosphatase 126 33 - 136 U/L    Total Protein 6.9 6.4 - 8.2 g/dL    AST 15 9 - 39 U/L    Bilirubin, Total 0.6 0.0 - 1.2 mg/dL    ALT 18 7 - 45 U/L   CBC and Auto Differential   Result Value Ref Range    WBC 5.2 4.4 - 11.3 x10*3/uL    nRBC 0.0 0.0 - 0.0 /100 WBCs    RBC 4.67 4.00 - 5.20 x10*6/uL    Hemoglobin 12.7 12.0 - 16.0 g/dL    Hematocrit 38.9 36.0 - 46.0 %    MCV 83 80 - 100 fL    MCH 27.2 26.0 - 34.0 pg    MCHC 32.6 32.0 - 36.0 g/dL    RDW 12.6 11.5 - 14.5 %    Platelets 203 150 - 450 x10*3/uL    Neutrophils % 50.9 40.0 - 80.0 %    Immature Granulocytes %, Automated 0.4 0.0 - 0.9 %    Lymphocytes % 38.3 13.0 - 44.0 %    Monocytes % 7.3 2.0 - 10.0 %    Eosinophils % 2.5 0.0 - 6.0 %    Basophils % 0.6 0.0 - 2.0 %    Neutrophils Absolute 2.66 1.20 - 7.70 x10*3/uL    Immature Granulocytes Absolute, Automated 0.02 0.00 - 0.70 x10*3/uL    Lymphocytes Absolute 2.00 1.20 - 4.80 x10*3/uL    Monocytes Absolute 0.38 0.10 - 1.00 x10*3/uL    Eosinophils  Absolute 0.13 0.00 - 0.70 x10*3/uL    Basophils Absolute 0.03 0.00 - 0.10 x10*3/uL   B-Type Natriuretic Peptide   Result Value Ref Range    BNP 85 0 - 99 pg/mL   POCT GLUCOSE   Result Value Ref Range    POCT Glucose 318 (H) 74 - 99 mg/dL   POCT GLUCOSE   Result Value Ref Range    POCT Glucose 414 (H) 74 - 99 mg/dL   POCT GLUCOSE   Result Value Ref Range    POCT Glucose 380 (H) 74 - 99 mg/dL   Coagulation Screen   Result Value Ref Range    Protime 11.0 9.8 - 12.8 seconds    INR 1.0 0.9 - 1.1    aPTT 28 27 - 38 seconds     XR chest 2 views    Result Date: 9/9/2024  Interpreted By:  Pascale Dorsey, STUDY: XR CHEST 2 VIEWS;  9/9/2024 4:17 pm   INDICATION: Signs/Symptoms:SOB.     COMPARISON: None.   ACCESSION NUMBER(S): [id number]   ORDERING CLINICIAN: JOSHUA FOWLER   FINDINGS: AP and lateral radiographs of the chest were provided.       CARDIOMEDIASTINAL SILHOUETTE: Cardiomediastinal silhouette is normal in size and configuration.   LUNGS: There are low lung volumes with bronchovascular crowding without maikel pulmonary edema. There is no consolidation, pleural effusion, nor pneumothorax.   ABDOMEN: No remarkable upper abdominal findings.   BONES: No acute osseous changes.       No evidence of acute cardiopulmonary process.   MACRO: None   Signed by: Pascale Dorsey 9/9/2024 4:22 PM Dictation workstation:   [id number]   Scheduled medications  acetaminophen, 650 mg, oral, q6h  [START ON 9/15/2024] ergocalciferol, 1,250 mcg, oral, Every Sunday  heparin (porcine), 5,000 Units, subcutaneous, q8h  hydroCHLOROthiazide, 25 mg, oral, Daily  insulin glargine, 48 Units, subcutaneous, Nightly  insulin lispro, 20 Units, subcutaneous, TID  [Held by provider] losartan, 50 mg, oral, Daily  metoprolol tartrate, 75 mg, oral, BID  polyethylene glycol, 17 g, oral, q12h  rosuvastatin, 40 mg, oral, Nightly  sennosides-docusate sodium, 2 tablet, oral, BID  verapamil, 120 mg, oral, TID  zolpidem, 10 mg, oral, Nightly      Continuous  medications     PRN medications  PRN medications: albuterol, dextrose, dextrose, glucagon, glucagon, HYDROmorphone, naloxone, oxyCODONE, oxyCODONE, oxyCODONE, oxygen       Assessment/Plan     Abbie Mayo is a 69 y.o. female with PMH of asthma, HTN, HLD, HTN, previous MI, severe spinal stenosis, PVCs. presenting for T10-T11 laminectomy facetectomy with T10 -11 posterior nonsegmental instrumented fusion with L2-3 minimally invasive laminectomy. Preoperative medicine consulted for preop evaluation.     For cardiac risk stratification, patient with previous MI. Nuclear Stress Test summary from 9/6/24 at [place] showed no reversible perfusion defects to suggest ischemia. EF reportedly 58%. EKG shows NSR, without ST elevations or T wave inversions. Would recommend repeat Echocardiogram for assessment of cardiac function and the valves. RCRI 2 points (previous history of MI and insulin use), elevated 30-day risk of death, MI, or cardiac arrest.     Based on duke activity status index, METs 3.63. Patient able to perform ADLs, walk indoors using walker, do light chores around the house before she became wheelchair bound without chest pain or anginal symptoms.     From cardiac standpoint, patient is elevated risk but acceptable to proceed.     From pulmonary standpoint, patient requires further optimization. Patient having acute asthma exacerbation, given wheezing, patient is hunched over on table, unable to finish sentences, and poorly controlled asthma given nocturnal awakening and frequent use of rescue inhaler. Recommend solumedrol 60 mg q6 and duo-nebs q6.    Patient seen and discussed with attending Dr. Oliver.    Brent Tineo MD

## 2024-09-11 ENCOUNTER — APPOINTMENT (OUTPATIENT)
Dept: CARDIOLOGY | Facility: HOSPITAL | Age: 69
End: 2024-09-11
Payer: MEDICARE

## 2024-09-11 ENCOUNTER — APPOINTMENT (OUTPATIENT)
Dept: VASCULAR MEDICINE | Facility: HOSPITAL | Age: 69
End: 2024-09-11
Payer: MEDICARE

## 2024-09-11 LAB
AORTIC VALVE PEAK VELOCITY: 1.31 M/S
AV PEAK GRADIENT: 6.9 MMHG
AVA (PEAK VEL): 2.54 CM2
EJECTION FRACTION APICAL 4 CHAMBER: 58.4
EJECTION FRACTION: 62 %
GLUCOSE BLD MANUAL STRIP-MCNC: 153 MG/DL (ref 74–99)
GLUCOSE BLD MANUAL STRIP-MCNC: 352 MG/DL (ref 74–99)
GLUCOSE BLD MANUAL STRIP-MCNC: 378 MG/DL (ref 74–99)
LEFT VENTRICLE INTERNAL DIMENSION DIASTOLE: 4.5 CM (ref 3.5–6)
LEFT VENTRICULAR OUTFLOW TRACT DIAMETER: 2 CM
MITRAL VALVE E/A RATIO: 0.74
RIGHT VENTRICLE FREE WALL PEAK S': 6.84 CM/S

## 2024-09-11 PROCEDURE — 2500000002 HC RX 250 W HCPCS SELF ADMINISTERED DRUGS (ALT 637 FOR MEDICARE OP, ALT 636 FOR OP/ED)

## 2024-09-11 PROCEDURE — 93306 TTE W/DOPPLER COMPLETE: CPT

## 2024-09-11 PROCEDURE — 1200000002 HC GENERAL ROOM WITH TELEMETRY DAILY

## 2024-09-11 PROCEDURE — 93306 TTE W/DOPPLER COMPLETE: CPT | Performed by: INTERNAL MEDICINE

## 2024-09-11 PROCEDURE — 2500000001 HC RX 250 WO HCPCS SELF ADMINISTERED DRUGS (ALT 637 FOR MEDICARE OP): Performed by: ANESTHESIOLOGY

## 2024-09-11 PROCEDURE — 99231 SBSQ HOSP IP/OBS SF/LOW 25: CPT | Performed by: NEUROLOGICAL SURGERY

## 2024-09-11 PROCEDURE — 93970 EXTREMITY STUDY: CPT | Performed by: SURGERY

## 2024-09-11 PROCEDURE — 99222 1ST HOSP IP/OBS MODERATE 55: CPT

## 2024-09-11 PROCEDURE — 2500000001 HC RX 250 WO HCPCS SELF ADMINISTERED DRUGS (ALT 637 FOR MEDICARE OP)

## 2024-09-11 PROCEDURE — 82947 ASSAY GLUCOSE BLOOD QUANT: CPT

## 2024-09-11 PROCEDURE — 2500000002 HC RX 250 W HCPCS SELF ADMINISTERED DRUGS (ALT 637 FOR MEDICARE OP, ALT 636 FOR OP/ED): Performed by: ANESTHESIOLOGY

## 2024-09-11 PROCEDURE — 2500000004 HC RX 250 GENERAL PHARMACY W/ HCPCS (ALT 636 FOR OP/ED): Performed by: ANESTHESIOLOGY

## 2024-09-11 PROCEDURE — 2500000004 HC RX 250 GENERAL PHARMACY W/ HCPCS (ALT 636 FOR OP/ED)

## 2024-09-11 PROCEDURE — 94640 AIRWAY INHALATION TREATMENT: CPT

## 2024-09-11 PROCEDURE — 93970 EXTREMITY STUDY: CPT

## 2024-09-11 RX ORDER — INSULIN LISPRO 100 [IU]/ML
0-5 INJECTION, SOLUTION INTRAVENOUS; SUBCUTANEOUS
Status: DISCONTINUED | OUTPATIENT
Start: 2024-09-11 | End: 2024-09-12

## 2024-09-11 ASSESSMENT — PAIN - FUNCTIONAL ASSESSMENT
PAIN_FUNCTIONAL_ASSESSMENT: 0-10

## 2024-09-11 ASSESSMENT — COGNITIVE AND FUNCTIONAL STATUS - GENERAL
STANDING UP FROM CHAIR USING ARMS: TOTAL
MOVING FROM LYING ON BACK TO SITTING ON SIDE OF FLAT BED WITH BEDRAILS: A LITTLE
STANDING UP FROM CHAIR USING ARMS: A LOT
TOILETING: A LITTLE
MOVING FROM LYING ON BACK TO SITTING ON SIDE OF FLAT BED WITH BEDRAILS: A LOT
CLIMB 3 TO 5 STEPS WITH RAILING: TOTAL
MOVING TO AND FROM BED TO CHAIR: A LOT
MOVING TO AND FROM BED TO CHAIR: A LOT
TURNING FROM BACK TO SIDE WHILE IN FLAT BAD: A LOT
DAILY ACTIVITIY SCORE: 17
HELP NEEDED FOR BATHING: A LOT
MOBILITY SCORE: 10
WALKING IN HOSPITAL ROOM: TOTAL
PERSONAL GROOMING: A LITTLE
MOBILITY SCORE: 10
DRESSING REGULAR LOWER BODY CLOTHING: A LOT
CLIMB 3 TO 5 STEPS WITH RAILING: TOTAL
DRESSING REGULAR UPPER BODY CLOTHING: A LITTLE
WALKING IN HOSPITAL ROOM: TOTAL
TURNING FROM BACK TO SIDE WHILE IN FLAT BAD: A LOT

## 2024-09-11 ASSESSMENT — PAIN DESCRIPTION - ORIENTATION
ORIENTATION: RIGHT;LEFT

## 2024-09-11 ASSESSMENT — PAIN DESCRIPTION - LOCATION
LOCATION: HIP
LOCATION: HIP
LOCATION: LEG
LOCATION: LEG

## 2024-09-11 ASSESSMENT — PAIN SCALES - GENERAL
PAINLEVEL_OUTOF10: 3
PAINLEVEL_OUTOF10: 3
PAINLEVEL_OUTOF10: 4
PAINLEVEL_OUTOF10: 2
PAINLEVEL_OUTOF10: 3

## 2024-09-11 NOTE — DOCUMENTATION CLARIFICATION NOTE
"    PATIENT:               AL MORRIS  ACCT #:                  [id number]  MRN:                       [id number]  :                       1955  ADMIT DATE:       2024 12:06 PM  DISCH DATE:  RESPONDING PROVIDER #:        [id number]          PROVIDER RESPONSE TEXT:    Paraparesis    CDI QUERY TEXT:    Clarification    Instruction:    Based on your assessment of the patient and the clinical information, please provide the requested documentation by clicking on the appropriate radio button and enter any additional information if prompted.    Question: Is there a diagnosis indicative of the lab values or image study    When answering this query, please exercise your independent professional judgment. The fact that a question is being asked, does not imply that any particular answer is desired or expected.    The patient's clinical indicators include:  Clinical Information: 69 y.o. female with a history of asthma, HTN, HLD, prior MI, DM, progressive thoracic myelopathy, now wheelchair bound who presents with shortness of breath prior to anticipated surgery    Clinical Indicators:   ED provider notes by Rafaela Valle PA-C \"  Bilateral lower extremity weakness.  Full strength and sensation to upper extremities bilaterally.\"   \"she states that she has been wheelchair-bound for the past 6 months. \"      HP by Dr. Bell \" progressive thoracic myelopathy, now wheelchair bound \" physical exam \"RLE HF4 KE/PF/DF4 ,  LLE HF3 KE4 PF/DF4 \" \"planning for T10-T11 laminectomy facetectomy with T10 -11 posterior nonsegmental instrumented fusion \"    Treatment: OR planning for T10-T11 laminectomy facetectomy with T10 -11 posterior nonsegmental instrumented fusion    Risk Factors: progressive myelopathy, wheelchair bound.  Options provided:  -- Paraparesis  -- Paraparesis not present  -- Other - I will add my own diagnosis  -- Refer to Clinical Documentation Reviewer    Query created by: Oralia Holbrook on 2024 1:23 " PM      Electronically signed by:  JOZEF MARTINEZ MD 9/11/2024 5:47 PM

## 2024-09-11 NOTE — PROGRESS NOTES
"Abbie Mayo is a 69 y.o. female on day 2 of admission presenting with Thoracic myelopathy.    Subjective   NAEO       Objective     Physical Exam  Awake, Ox3, face symmetric  Ox3  BUE 5/5  RLE HF4 KE/PF/DF4+  LLE HF3 KE4 PF/DF4+    Last Recorded Vitals  Blood pressure 146/76, pulse 76, temperature 35.7 °C (96.3 °F), resp. rate 23, height 1.676 m (5' 6\"), weight 99.8 kg (220 lb), SpO2 93%, peak flow 325 L/min.  Intake/Output last 3 Shifts:  No intake/output data recorded.    Relevant Results                   Results for orders placed or performed during the hospital encounter of 09/09/24 (from the past 24 hour(s))   POCT GLUCOSE   Result Value Ref Range    POCT Glucose 380 (H) 74 - 99 mg/dL   Coagulation Screen   Result Value Ref Range    Protime 11.0 9.8 - 12.8 seconds    INR 1.0 0.9 - 1.1    aPTT 28 27 - 38 seconds   POCT GLUCOSE   Result Value Ref Range    POCT Glucose 153 (H) 74 - 99 mg/dL   Urinalysis with Reflex Microscopic   Result Value Ref Range    Color, Urine Yellow Light-Yellow, Yellow, Dark-Yellow    Appearance, Urine Turbid (N) Clear    Specific Gravity, Urine 1.031 1.005 - 1.035    pH, Urine 5.5 5.0, 5.5, 6.0, 6.5, 7.0, 7.5, 8.0    Protein, Urine 20 (TRACE) NEGATIVE, 10 (TRACE), 20 (TRACE) mg/dL    Glucose, Urine OVER (4+) (A) Normal mg/dL    Blood, Urine NEGATIVE NEGATIVE    Ketones, Urine TRACE (A) NEGATIVE mg/dL    Bilirubin, Urine NEGATIVE NEGATIVE    Urobilinogen, Urine Normal Normal mg/dL    Nitrite, Urine 2+ (A) NEGATIVE    Leukocyte Esterase, Urine 500 Honey/µL (A) NEGATIVE   Microscopic Only, Urine   Result Value Ref Range    WBC, Urine >50 (A) 1-5, NONE /HPF    RBC, Urine 6-10 (A) NONE, 1-2, 3-5 /HPF    Squamous Epithelial Cells, Urine 1-9 (SPARSE) Reference range not established. /HPF    Bacteria, Urine 4+ (A) NONE SEEN /HPF    Mucus, Urine 2+ Reference range not established. /LPF    Calcium Oxalate Crystals, Urine 2+ (A) NONE, 1+ /HPF   POCT GLUCOSE   Result Value Ref Range    POCT " Glucose 137 (H) 74 - 99 mg/dL   Vascular US Lower Extremity Venous Duplex Bilateral   Result Value Ref Range    BSA 2.16 m2                Assessment/Plan   Assessment & Plan  Thoracic myelopathy    Abbie Mayo is a 69 y.o. female with a history of asthma, HTN, HLD, prior MI, DM, progressive thoracic myelopathy, now wheelchair bound who presents with shortness of breath prior to anticipated surgery 9/12.      Plan   tele,   OR Fri 9/12 T10-11 lami w/ fusion, L2-3 MIS lami pending clearance and resp optimization,   mehrdad recs-RCRI II, needs pulm optimization,   Ucx  TTE today  BLE Dvt US   PTOT        Kathleen Hernandez MD

## 2024-09-11 NOTE — PROGRESS NOTES
Physical Therapy                 Therapy Communication Note    Patient Name: Abbie Mayo  MRN: [id number]  Department: [place] [id number]  Room: [address] [place] [address]  Today's Date: 9/11/2024     Discipline: Physical Therapy    Missed Visit Reason: Missed Visit Reason: Other (Comment) (pt pending OR Thurs 9/12 T10-11 lami w/ fusion, L2-3 L2-3 MIS lami; will await post-op orders)    Missed Time: Attempt    Comment: 14:43pm

## 2024-09-11 NOTE — PROGRESS NOTES
Met with patient and introduced myself as Care Coordinator and member of the discharge planning team.  Pt was admitted preoperatively for planned thoracic laminectomy w fusion. Pt lives with her  and son. She has not been ambulatory recently and they assist patient with ADL's. Pt prefers to discharge to home at time of discharge. She says the house is full of boxes while they are renovating the kitchen , bath and bedroom. We will plan to reassess discharge disposition post op following therapy recommendations. Care coordinator will continue to follow for home going needs.  Aminah Justin RN

## 2024-09-11 NOTE — CONSULTS
Pulmonology Consult Note    Reason For Consult  Preoperative clearance; pulmonary optimization    History Of Present Illness  Abbie Mayo is a 69 y.o. female with a history of asthma, HTN, HLD, previous MI, PVC's, and severe spinal stenosis who was admitted to the ED from Neurosurgical pre-op testing with shortness of breath on 9/9/24.     Pt has hx of severe spinal stenosis and is planned to undergo T10-T11 laminectomy facetectomy with T10-T11 posterior nonsegmental instrumented fusion with L2-L3 minimally invasive laminectomy.   Pulmonology is consulted for optimization of respiratory status prior to surgery.     She reports shortness of breath and bothersome cough productive of white phlegm for the past three weeks. She notes a history of asthma and similar symptoms with previous exacerbations. She has been using her albuterol inhaler three times daily for the past three weeks. She normally does not need her rescue inhaler or have any cough at baseline. She was prescribed a course of azithromycin by her PCP, which did improve her symptoms. She normally needs 4-5 pillows to sleep comfortably in bed.   She reports some improvement in her work of breathing with steroids and breathing treatments over the past day.     Denies recent travel or sick contacts. Denies fever, chills, n/v, abd pain, diarrhea. Denies any known environmental allergies. Endorses continued dyspnea and wheezing.     She notes having PFT's completed last several years ago and that she has not seen a Pulmonologist recently.   Pt had nuclear stress test on 9/6/24 which showed no reversible perfusion defects. Repeat TTE was completed on 9/11/24, with read pending.     Medicine was consulted on admission for pre-op testing. Infectious workup so far for flu, and covid negative. Trop and BNP wnl without leukocytosis on CBC. IgE wnl. VBG remarkable for pCO2 54 and HCO3 29.8  She was started on Duo-Nebs q6h and SoluMedrol 60 mg q6h.      Past Medical  History  She has a past medical history of Anxiety, Asthma (HHS-HCC), Complaining of cough, Coronary artery disease, History of chronic ischemic heart disease, Hyperlipidemia, Hypertension, Lumbar radiculopathy, Myocardial infarction (Multi), Pseudoclaudication syndrome, Shortness of breath, Spinal stenosis, and Type 2 diabetes mellitus (Multi).    Surgical History  She has a past surgical history that includes Iridotomy / iridectomy; Cholecystectomy; Cataract extraction; Cardiac catheterization; and Colonoscopy.     Social History  She reports that she has never smoked. She has never used smokeless tobacco. She reports that she does not drink alcohol and does not use drugs.    Family History  No family history on file.     Allergies  Suvorexant    Review of Systems  Per HPI     Physical Exam  General: Alert and interactive, NAD, on room air, overweight  HEENT: NC/AT, EOMI, no conjunctival icterus, mucosa   Cardiovascular: Tachycardic with regular rhythm. No murmurs  Pulmonary: Lungs CTA bilaterally. Diminished breath sounds with expiratory wheezing diffusely.   GI/Abd: Normoactive bowel sounds. No abd distention or TTP. No organomegaly  : N/A  Extremities: 1+ nonpitting edema  Skin: No jaundice, rashes, or surgical scars. No wounds  Neuro: Alert and oriented x3. Has weakness of right lower extremity with dorsiflexion  Psych: Appropriate mood and affect       Last Recorded Vitals  /76   Pulse 76   Temp 35.7 °C (96.3 °F)   Resp 23   Wt 99.8 kg (220 lb)   SpO2 93%    L/min     Relevant Results       Assessment/Plan     Abbie Mayo is a 70 yo F with a PMHx of asthma, HTN, HLD, previous MI, PVC's, and severe spinal stenosis who was admitted to the ED from pre-op testing with shortness of breath. She is tentatively scheduled to undergo T10-T11 laminectomy facetectomy with L2-L3 laminectomy on 9/13. Pt presenting with acute asthma exacerbation being treated with Duo-Nebs q6h and SoluMedrol 60 mg  q6h. Pulmonology consulted for medical optimization prior to surgery.     Patient's symptoms and history are suggestive of asthma, but pt's dyspnea and chronic cough may be due to other factors such as allergic rhinits, GERD, obesity hypoventilation syndrome, COPD as well. It would be helpful to obtain pulmonary function testing to solidify diagnosis and adjust treatment.     #C/f reactive airway disease  #C/f asthma exacerbation  #Chronic cough  #Severe thoracic spinal stenosis    Plan/Recommendations:  -If spinal surgery is elective and not acutely needed, would recommend withholding surgery for four weeks to allow for resolution of respiratory symptoms  -Will order pulmonary function testing that can be completed while inpatient as long as patient is capable with current functional status  -Continue scheduled DuoNebs and SoluMedrol    Thank you for consulting pulmonology, we will continue to follow.     Blake Warren MD PGY-1  Internal Medicine

## 2024-09-11 NOTE — PROGRESS NOTES
Occupational Therapy                 Therapy Communication Note    Patient Name: Abbie Mayo  MRN: [id number]  Department: [facility] [address]  Room: [address] [place], [place] [address]  Today's Date: 9/11/2024     Discipline: Occupational Therapy    Missed Visit Reason:  (Hold OT eval at this time -- pt planned for OR tomorrow (9/12); will attempt post-op.)    Missed Time: Attempt     ---------------  Marisol Knight (OTR/L, OTD)  Inpatient Occupational Therapist   Rehab Office: [phone]

## 2024-09-12 ENCOUNTER — DOCUMENTATION (OUTPATIENT)
Dept: RESPIRATORY THERAPY | Facility: HOSPITAL | Age: 69
End: 2024-09-12
Payer: MEDICARE

## 2024-09-12 ENCOUNTER — APPOINTMENT (OUTPATIENT)
Dept: RESPIRATORY THERAPY | Facility: HOSPITAL | Age: 69
End: 2024-09-12
Payer: MEDICARE

## 2024-09-12 ENCOUNTER — APPOINTMENT (OUTPATIENT)
Dept: RADIOLOGY | Facility: HOSPITAL | Age: 69
End: 2024-09-12
Payer: MEDICARE

## 2024-09-12 PROBLEM — E11.9 TYPE 2 DIABETES MELLITUS (MULTI): Status: ACTIVE | Noted: 2024-09-12

## 2024-09-12 LAB
ALBUMIN SERPL BCP-MCNC: 4 G/DL (ref 3.4–5)
ANION GAP BLDV CALCULATED.4IONS-SCNC: 10 MMOL/L (ref 10–25)
ANION GAP SERPL CALC-SCNC: 13 MMOL/L (ref 10–20)
BASE EXCESS BLDV CALC-SCNC: 3.5 MMOL/L (ref -2–3)
BODY TEMPERATURE: 37 DEGREES CELSIUS
BUN SERPL-MCNC: 28 MG/DL (ref 6–23)
CA-I BLDV-SCNC: 1.28 MMOL/L (ref 1.1–1.33)
CALCIUM SERPL-MCNC: 9.3 MG/DL (ref 8.6–10.6)
CHLORIDE BLDV-SCNC: 102 MMOL/L (ref 98–107)
CHLORIDE SERPL-SCNC: 106 MMOL/L (ref 98–107)
CO2 SERPL-SCNC: 26 MMOL/L (ref 21–32)
CREAT SERPL-MCNC: 0.95 MG/DL (ref 0.5–1.05)
EGFRCR SERPLBLD CKD-EPI 2021: 65 ML/MIN/1.73M*2
GLUCOSE BLD MANUAL STRIP-MCNC: 144 MG/DL (ref 74–99)
GLUCOSE BLD MANUAL STRIP-MCNC: 209 MG/DL (ref 74–99)
GLUCOSE BLD MANUAL STRIP-MCNC: 216 MG/DL (ref 74–99)
GLUCOSE BLD MANUAL STRIP-MCNC: 286 MG/DL (ref 74–99)
GLUCOSE BLD MANUAL STRIP-MCNC: 342 MG/DL (ref 74–99)
GLUCOSE BLD MANUAL STRIP-MCNC: 423 MG/DL (ref 74–99)
GLUCOSE BLD MANUAL STRIP-MCNC: 522 MG/DL (ref 74–99)
GLUCOSE BLD MANUAL STRIP-MCNC: 545 MG/DL (ref 74–99)
GLUCOSE BLDV-MCNC: 490 MG/DL (ref 74–99)
GLUCOSE SERPL-MCNC: 120 MG/DL (ref 74–99)
HCO3 BLDV-SCNC: 27.8 MMOL/L (ref 22–26)
HCT VFR BLD EST: 39 % (ref 36–46)
HGB BLDV-MCNC: 12.9 G/DL (ref 12–16)
INHALED O2 CONCENTRATION: 21 %
LACTATE BLDV-SCNC: 3 MMOL/L (ref 0.4–2)
MGC ASCENT PFT - FEV1 - POST: 1.21
MGC ASCENT PFT - FEV1 - PRE: 1.18
MGC ASCENT PFT - FEV1 - PREDICTED: 2.35
MGC ASCENT PFT - FVC - POST: 1.77
MGC ASCENT PFT - FVC - PRE: 1.91
MGC ASCENT PFT - FVC - PREDICTED: 3.03
OXYHGB MFR BLDV: 92.2 % (ref 45–75)
PCO2 BLDV: 40 MM HG (ref 41–51)
PH BLDV: 7.45 PH (ref 7.33–7.43)
PHOSPHATE SERPL-MCNC: 2.8 MG/DL (ref 2.5–4.9)
PO2 BLDV: 66 MM HG (ref 35–45)
POTASSIUM BLDV-SCNC: 4.3 MMOL/L (ref 3.5–5.3)
POTASSIUM SERPL-SCNC: 3.7 MMOL/L (ref 3.5–5.3)
SAO2 % BLDV: 94 % (ref 45–75)
SODIUM BLDV-SCNC: 135 MMOL/L (ref 136–145)
SODIUM SERPL-SCNC: 141 MMOL/L (ref 136–145)

## 2024-09-12 PROCEDURE — 36415 COLL VENOUS BLD VENIPUNCTURE: CPT

## 2024-09-12 PROCEDURE — 97161 PT EVAL LOW COMPLEX 20 MIN: CPT | Mod: GP

## 2024-09-12 PROCEDURE — 2550000001 HC RX 255 CONTRASTS: Performed by: NEUROLOGICAL SURGERY

## 2024-09-12 PROCEDURE — 2500000004 HC RX 250 GENERAL PHARMACY W/ HCPCS (ALT 636 FOR OP/ED): Performed by: PHYSICIAN ASSISTANT

## 2024-09-12 PROCEDURE — 2500000002 HC RX 250 W HCPCS SELF ADMINISTERED DRUGS (ALT 637 FOR MEDICARE OP, ALT 636 FOR OP/ED)

## 2024-09-12 PROCEDURE — 2500000002 HC RX 250 W HCPCS SELF ADMINISTERED DRUGS (ALT 637 FOR MEDICARE OP, ALT 636 FOR OP/ED): Performed by: PHYSICIAN ASSISTANT

## 2024-09-12 PROCEDURE — 94060 EVALUATION OF WHEEZING: CPT

## 2024-09-12 PROCEDURE — 2500000001 HC RX 250 WO HCPCS SELF ADMINISTERED DRUGS (ALT 637 FOR MEDICARE OP): Performed by: ANESTHESIOLOGY

## 2024-09-12 PROCEDURE — 99232 SBSQ HOSP IP/OBS MODERATE 35: CPT | Performed by: NEUROLOGICAL SURGERY

## 2024-09-12 PROCEDURE — 2500000004 HC RX 250 GENERAL PHARMACY W/ HCPCS (ALT 636 FOR OP/ED)

## 2024-09-12 PROCEDURE — 99222 1ST HOSP IP/OBS MODERATE 55: CPT | Performed by: STUDENT IN AN ORGANIZED HEALTH CARE EDUCATION/TRAINING PROGRAM

## 2024-09-12 PROCEDURE — 99221 1ST HOSP IP/OBS SF/LOW 40: CPT

## 2024-09-12 PROCEDURE — 94640 AIRWAY INHALATION TREATMENT: CPT

## 2024-09-12 PROCEDURE — 2500000002 HC RX 250 W HCPCS SELF ADMINISTERED DRUGS (ALT 637 FOR MEDICARE OP, ALT 636 FOR OP/ED): Performed by: ANESTHESIOLOGY

## 2024-09-12 PROCEDURE — 1200000002 HC GENERAL ROOM WITH TELEMETRY DAILY

## 2024-09-12 PROCEDURE — 97166 OT EVAL MOD COMPLEX 45 MIN: CPT | Mod: GO

## 2024-09-12 PROCEDURE — 84520 ASSAY OF UREA NITROGEN: CPT

## 2024-09-12 PROCEDURE — 71275 CT ANGIOGRAPHY CHEST: CPT

## 2024-09-12 PROCEDURE — 2500000001 HC RX 250 WO HCPCS SELF ADMINISTERED DRUGS (ALT 637 FOR MEDICARE OP)

## 2024-09-12 PROCEDURE — 97530 THERAPEUTIC ACTIVITIES: CPT | Mod: GO

## 2024-09-12 PROCEDURE — 71275 CT ANGIOGRAPHY CHEST: CPT | Performed by: RADIOLOGY

## 2024-09-12 PROCEDURE — 82947 ASSAY GLUCOSE BLOOD QUANT: CPT

## 2024-09-12 PROCEDURE — 84132 ASSAY OF SERUM POTASSIUM: CPT

## 2024-09-12 PROCEDURE — 97530 THERAPEUTIC ACTIVITIES: CPT | Mod: GP

## 2024-09-12 RX ORDER — INSULIN LISPRO 100 [IU]/ML
0-20 INJECTION, SOLUTION INTRAVENOUS; SUBCUTANEOUS
Status: DISCONTINUED | OUTPATIENT
Start: 2024-09-12 | End: 2024-09-12

## 2024-09-12 RX ORDER — POTASSIUM CHLORIDE 14.9 MG/ML
20 INJECTION INTRAVENOUS ONCE
Status: COMPLETED | OUTPATIENT
Start: 2024-09-12 | End: 2024-09-12

## 2024-09-12 RX ORDER — IPRATROPIUM BROMIDE AND ALBUTEROL SULFATE 2.5; .5 MG/3ML; MG/3ML
3 SOLUTION RESPIRATORY (INHALATION) EVERY 6 HOURS PRN
Status: DISCONTINUED | OUTPATIENT
Start: 2024-09-12 | End: 2024-09-13 | Stop reason: HOSPADM

## 2024-09-12 RX ORDER — INSULIN LISPRO 100 [IU]/ML
30 INJECTION, SOLUTION INTRAVENOUS; SUBCUTANEOUS
Status: DISCONTINUED | OUTPATIENT
Start: 2024-09-12 | End: 2024-09-13 | Stop reason: HOSPADM

## 2024-09-12 RX ORDER — PREDNISONE 20 MG/1
40 TABLET ORAL DAILY
Status: CANCELLED | OUTPATIENT
Start: 2024-09-13

## 2024-09-12 RX ORDER — GUAIFENESIN 600 MG/1
600 TABLET, EXTENDED RELEASE ORAL 2 TIMES DAILY PRN
Status: DISCONTINUED | OUTPATIENT
Start: 2024-09-12 | End: 2024-09-13 | Stop reason: HOSPADM

## 2024-09-12 RX ORDER — INSULIN LISPRO 100 [IU]/ML
0-5 INJECTION, SOLUTION INTRAVENOUS; SUBCUTANEOUS
Status: DISCONTINUED | OUTPATIENT
Start: 2024-09-12 | End: 2024-09-12

## 2024-09-12 RX ORDER — INSULIN LISPRO 100 [IU]/ML
0-5 INJECTION, SOLUTION INTRAVENOUS; SUBCUTANEOUS
Status: DISCONTINUED | OUTPATIENT
Start: 2024-09-12 | End: 2024-09-13

## 2024-09-12 RX ORDER — INSULIN LISPRO 100 [IU]/ML
10 INJECTION, SOLUTION INTRAVENOUS; SUBCUTANEOUS ONCE
Status: COMPLETED | OUTPATIENT
Start: 2024-09-12 | End: 2024-09-12

## 2024-09-12 RX ORDER — PREDNISONE 20 MG/1
40 TABLET ORAL DAILY
Status: DISCONTINUED | OUTPATIENT
Start: 2024-09-12 | End: 2024-09-13 | Stop reason: HOSPADM

## 2024-09-12 RX ORDER — INSULIN LISPRO 100 [IU]/ML
20 INJECTION, SOLUTION INTRAVENOUS; SUBCUTANEOUS ONCE
Status: COMPLETED | OUTPATIENT
Start: 2024-09-12 | End: 2024-09-12

## 2024-09-12 ASSESSMENT — COGNITIVE AND FUNCTIONAL STATUS - GENERAL
MOBILITY SCORE: 9
MOVING FROM LYING ON BACK TO SITTING ON SIDE OF FLAT BED WITH BEDRAILS: A LOT
PERSONAL GROOMING: A LITTLE
MOBILITY SCORE: 11
HELP NEEDED FOR BATHING: A LOT
MOVING TO AND FROM BED TO CHAIR: A LOT
TOILETING: A LOT
TURNING FROM BACK TO SIDE WHILE IN FLAT BAD: A LOT
STANDING UP FROM CHAIR USING ARMS: A LOT
MOVING TO AND FROM BED TO CHAIR: A LOT
CLIMB 3 TO 5 STEPS WITH RAILING: TOTAL
DRESSING REGULAR LOWER BODY CLOTHING: A LOT
WALKING IN HOSPITAL ROOM: A LOT
WALKING IN HOSPITAL ROOM: TOTAL
DRESSING REGULAR LOWER BODY CLOTHING: A LOT
CLIMB 3 TO 5 STEPS WITH RAILING: TOTAL
TOILETING: A LITTLE
MOVING FROM LYING ON BACK TO SITTING ON SIDE OF FLAT BED WITH BEDRAILS: A LOT
HELP NEEDED FOR BATHING: A LOT
TURNING FROM BACK TO SIDE WHILE IN FLAT BAD: A LOT
DAILY ACTIVITIY SCORE: 17
DAILY ACTIVITIY SCORE: 18
STANDING UP FROM CHAIR USING ARMS: TOTAL
DRESSING REGULAR UPPER BODY CLOTHING: A LITTLE

## 2024-09-12 ASSESSMENT — ENCOUNTER SYMPTOMS
COUGH: 1
ABDOMINAL PAIN: 0
PALPITATIONS: 0
WHEEZING: 1

## 2024-09-12 ASSESSMENT — PAIN SCALES - GENERAL
PAINLEVEL_OUTOF10: 0 - NO PAIN
PAINLEVEL_OUTOF10: 0 - NO PAIN
PAINLEVEL_OUTOF10: 4
PAINLEVEL_OUTOF10: 0 - NO PAIN
PAINLEVEL_OUTOF10: 2

## 2024-09-12 ASSESSMENT — ACTIVITIES OF DAILY LIVING (ADL)
TOILETING_ASSISTANCE: MINIMAL
BATHING_ASSISTANCE: MAXIMAL
ADL_ASSISTANCE: NEEDS ASSISTANCE
HOME_MANAGEMENT_TIME_ENTRY: 5

## 2024-09-12 ASSESSMENT — PAIN DESCRIPTION - ORIENTATION: ORIENTATION: RIGHT;LEFT

## 2024-09-12 ASSESSMENT — PAIN - FUNCTIONAL ASSESSMENT
PAIN_FUNCTIONAL_ASSESSMENT: 0-10

## 2024-09-12 ASSESSMENT — PAIN DESCRIPTION - LOCATION: LOCATION: LEG

## 2024-09-12 NOTE — PROCEDURES
Pre/Post bronchodilator Spirometry PFT completed.  Duoneb given. Pt. Has coughing on every effort.

## 2024-09-12 NOTE — PROGRESS NOTES
Occupational Therapy                   Therapy Communication Note    Patient Name: Abbie Mayo  MRN: [id number]  Department: [facility] [address]  Room: [address] [place] [address]  Today's Date: 9/12/2024     Discipline: Occupational Therapy    Missed Visit Reason: Missed Visit Reason: Other (Comment) (Pt currently pending OR for T10-11 lami w/ fusion, L2-3 L2-3 MIS lami- will reattempt as appropriate)    Missed Time: Attempt

## 2024-09-12 NOTE — PROGRESS NOTES
Occupational Therapy    Evaluation and Treatment    Patient Name: Abbie Mayo  MRN: [id number]  Today's Date: 9/12/2024  Room: [address] [place] [address]A  Time Calculation  Start Time: 1121  Stop Time: 1209  Time Calculation (min): 48 min    Assessment  IP OT Assessment  OT Assessment: Pt presents with impaired functional mobility, transfers, ADLs, balance, and endurance. Pt reports assistance for LB bathing, dressing at baseline as well as functional transfers and mobility. Pt tolerated treatment fairly demonstrating ability to transfer with MaxA x3 trials and transfer to chair- reporting fatigue at end of session. Pt is likely near reported baseline but requires increased assistance for safety. Pt is likely to benefit from skilled OT services at a LOW intensity with increased family support OR MOD intensity OT services if family unable to increase assistance provided.  Prognosis: Fair  Barriers to Discharge: None  Evaluation/Treatment Tolerance: Patient tolerated treatment well  Medical Staff Made Aware: Yes  End of Session Communication: Bedside nurse, PCT/NA/CTA  End of Session Patient Position: Up in chair, Alarm on  Plan:  Inpatient Plan  Treatment Interventions: ADL retraining, Functional transfer training, Equipment evaluation/education, Compensatory technique education  OT Frequency: 3 times per week  OT Discharge Recommendations: Moderate intensity level of continued care, Low intensity level of continued care (LOW vs MOD)  Equipment Recommended upon Discharge: Bedside commode  OT Recommended Transfer Status: Assist of 2  OT - OK to Discharge: Yes  OT Assessment  OT Assessment Results: Decreased ADL status, Decreased safe judgment during ADL, Decreased endurance, Decreased functional mobility, Decreased IADLs  Prognosis: Fair  Barriers to Discharge: None  Evaluation/Treatment Tolerance: Patient tolerated treatment well  Medical Staff Made Aware: Yes  Strengths: Attitude of self, Support of Caregivers  Barriers to  Participation: Comorbidities    Subjective   Current Problem:  1. Thoracic myelopathy  Vascular US Lower Extremity Venous Duplex Bilateral    Vascular US Lower Extremity Venous Duplex Bilateral    CANCELED: Vascular US Lower Extremity Venous Duplex Bilateral    CANCELED: Vascular US Lower Extremity Venous Duplex Bilateral      2. Moderate persistent asthma, unspecified whether complicated (HHS-HCC)        3. Spinal stenosis, unspecified spinal region        4. Swelling of both lower extremities  Vascular US Lower Extremity Venous Duplex Bilateral    Vascular US Lower Extremity Venous Duplex Bilateral    Transthoracic Echo (TTE) Complete    Transthoracic Echo (TTE) Complete    CANCELED: Vascular US Lower Extremity Venous Duplex Bilateral    CANCELED: Vascular US Lower Extremity Venous Duplex Bilateral    CANCELED: Transthoracic Echo (TTE) Complete    CANCELED: Transthoracic Echo (TTE) Complete      5. Evidence of prior myocardial infarction on electrocardiography  Transthoracic Echo (TTE) Complete    Transthoracic Echo (TTE) Complete    CANCELED: Transthoracic Echo (TTE) Complete    CANCELED: Transthoracic Echo (TTE) Complete      6. Localized edema  Vascular US Lower Extremity Venous Duplex Bilateral    Transthoracic Echo (TTE) Complete      7. Type 2 diabetes mellitus with stage 2 chronic kidney disease, with long-term current use of insulin (Multi)  Referral to Endocrinology        General:  Reason for Referral: Admitted 9/9 admitted to rule out PNA (SOB, productive cough); scheduled tof T10-T11 laminectomy on 9/12; dx: spinal stenosis with thoracic myelopathy; OR now scheduled as OP  Past Medical History Relevant to Rehab: asthma, HTN, HLD, MI (medically treated), spinal stenosis with thoracic myelopathy, PVCs, DM, asthma, anxiety, CAD, lumbar radiculopathy  Prior to Session Communication: Bedside nurse, PCT/NA/CTA  Patient Position Received: Bed, 3 rail up, Alarm on  Family/Caregiver Present: No  General  Comment: Pt supine n bed upon arrival. Pleasant and agreeable to OT eval and tx. Pt reporting preference of non male caregivers for ADL care.   Precautions:  Medical Precautions: Fall precautions  Vital Signs:  Vital Signs (Past 2hrs)        Date/Time Vitals Session Patient Position Pulse Resp SpO2 BP MAP (mmHg)    09/12/24 1510 --  --  --  21  96 %  --  --                   Pain:  Pain Assessment  Pain Assessment: 0-10  0-10 (Numeric) Pain Score: 0 - No pain  Lines/Tubes/Drains:  External Urinary Catheter (Active)   Number of days: 1         Objective   Cognition:  Overall Cognitive Status: Impaired  Arousal/Alertness: Appropriate responses to stimuli  Orientation Level: Oriented X4  Following Commands: Follows all commands and directions without difficulty  Safety Judgment: Decreased awareness of need for assistance  Problem Solving: Assistance required to identify errors made  Memory: Exceptions to WFL  Short-Term Memory: Impaired  Insight: Mild  Impulsive: Within functional limits  Processing Speed: Within funtional limits           Home Living:  Type of Home: House  Lives With: Spouse, Adult children (hsb, adult son)  Home Adaptive Equipment: None, Walker rolling or standard, Other (Comment) (rollator, MWC being delivered soon per report)  Home Layout: One level, Laundry in basement, Able to live on main level with bedroom/bathroom  Home Access: Stairs to enter with rails  Entrance Stairs-Rails: Left  Entrance Stairs-Number of Steps: 3 (plans to install WC ramp)  Bathroom Shower/Tub: Tub/shower unit  Bathroom Toilet: Standard  Bathroom Equipment: None  Bathroom Accessibility: Completes bathing while seated on toilet or rollator  Home Living Comments: Pt has her own bathroom near bedroom that she receives assistance to access; reports she is pushed while seated on rollator but able to perform short ambulation and stand pivot transfers (questionable historian at times)   Prior Function:  Level of Jones:  Needs assistance with ADLs, Needs assistance with homemaking, Needs assistance with functional transfers  Receives Help From: Family (Hsb, son, or granddaughters)  ADL Assistance: Needs assistance  Bath: Minimal, Sponge bathing (for feet)  Toileting: Minimal  Dressing: Moderate (LB)  Homemaking Assistance: Needs assistance  Ambulatory Assistance: Needs assistance  Transfers: Moderate  Hand Dominance: Right  IADL History:  Homemaking Responsibilities: No (son, hsb complete)  Current License: No  Mode of Transportation: Family  Occupation: Retired  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  Leisure and Hobbies: sewing, music  ADL:  Eating Assistance: Independent (Anticipated)  Grooming Assistance: Independent (Anticipated)  Bathing Assistance: Maximal (Anticipated)  UE Dressing Assistance: Independent (Anticipated)  LE Dressing Assistance: Maximal (Anticipated)  Toileting Assistance with Device: Moderate (Anticipated)  ADL Comments: Pt is near reported baseline ADL function  Activity Tolerance:  Endurance: Tolerates 10 - 20 min exercise with multiple rests  Balance:  Dynamic Sitting Balance  Dynamic Sitting-Comments: SBA  Dynamic Standing Balance  Dynamic Standing-Comments: ModA-MaxA with FWW and VCs for safety  Static Sitting Balance  Static Sitting-Comment/Number of Minutes: IND  Static Standing Balance  Static Standing-Comment/Number of Minutes: ModA with FWW  Bed Mobility/Transfers: Bed Mobility/Transfers: Bed Mobility  Bed Mobility: Yes  Bed Mobility 1  Bed Mobility 1: Supine to sitting  Level of Assistance 1: Moderate assistance, Minimal verbal cues  Bed Mobility Comments 1: HOB elevated, ModA for LB management, VCs for guidance  Functional Mobility  Functional Mobility Performed: Yes  Functional Mobility 1  Comments 1: Pt ambulated several short steps from bedside to chair nearby with Mod-Max assist using FWW and with VCs for safety, positioning, and guidance   and  Transfers  Transfer: Yes  Transfer 1  Transfer From 1: Bed to, Stand to  Transfer to 1: Stand, Bed  Technique 1: Sit to stand, Stand to sit  Transfer Device 1: Walker  Transfer Level of Assistance 1: Maximum assistance, Moderate verbal cues, Minimal tactile cues  Trials/Comments 1: x3 trials, VCs for FWW safety and hand placement (PRN rest between each trial)  Transfers 2  Transfer From 2: Stand to  Transfer to 2: Chair with arms  Technique 2: Stand pivot  Transfer Device 2: Walker  Transfer Level of Assistance 2: Moderate assistance, Moderate verbal cues, Minimal tactile cues  Trials/Comments 2: VCs for positioning and safety, hand placement  IADL's:   Homemaking Responsibilities: No (son, hsb complete)  Current License: No  Mode of Transportation: Family  Occupation: Retired  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  Leisure and Hobbies: sewing, music  Vision: Vision - Basic Assessment  Current Vision: Wears glasses only for reading  Visual History: Other (Comment) (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))   and Vision - Complex Assessment  Ocular Range of Motion: Within Functional Limits  Tracking: WFL  Sensation:  Light Touch: No apparent deficits  Strength:  Strength Comments: BUE WFL- grossly 5/5  Perception:  Inattention/Neglect: Appears intact  Coordination:  Movements are Fluid and Coordinated: Yes (BUE)   Hand Function:  Hand Function  Gross Grasp: Functional  Coordination: Functional  Extremities:   RUE   RUE : Within Functional Limits, LUE   LUE: Within Functional Limits,  , and      Outcome Measures: [facility] Daily Activity  Putting on and taking off regular lower body clothing: A lot  Bathing (including washing, rinsing, drying): A lot  Putting on and taking off regular upper body clothing: None  Toileting, which includes using toilet, bedpan or urinal: A lot  Taking care of personal grooming such as brushing teeth: None  Eating Meals: None  Daily Activity - Total Score:  18         ,     OT Adult Other Outcome Measures  4AT: -    Education Documentation  Body Mechanics, taught by Reji Scott OT at 9/12/2024  4:00 PM.  Learner: Patient  Readiness: Acceptance  Method: Explanation, Demonstration  Response: Verbalizes Understanding, Demonstrated Understanding    Precautions, taught by Reji Scott OT at 9/12/2024  4:00 PM.  Learner: Patient  Readiness: Acceptance  Method: Explanation, Demonstration  Response: Verbalizes Understanding, Demonstrated Understanding    ADL Training, taught by Reji Scott OT at 9/12/2024  4:00 PM.  Learner: Patient  Readiness: Acceptance  Method: Explanation, Demonstration  Response: Verbalizes Understanding, Demonstrated Understanding    Education Comments  No comments found.        Goals:   Encounter Problems       Encounter Problems (Active)       ADLs       Pt will complete UB /LB bathing tasks with modified independence while seated and AE as needed.        Start:  09/12/24    Expected End:  09/26/24            Pt will complete LB dressing with modified independence while seated and/or standing and AE as needed.        Start:  09/12/24    Expected End:  09/26/24            Pt will complete toilet hygiene while seated /standing with modified independence level of assistance.         Start:  09/12/24    Expected End:  09/26/24               BALANCE       Pt will maintain dynamic standing balance during ADL task with modified independent level of assistance in order to demonstrate decreased risk of falling and improved postural control.       Start:  09/12/24    Expected End:  09/26/24               COGNITION/SAFETY       Patient will score WFL on standardized cognitive assessment with no cues and within reasonable time frame       Start:  09/12/24    Expected End:  09/26/24               MOBILITY       Patient will perform Functional mobility min Household distances/Community Distances with modified independent level of  assistance and least restrictive device in order to improve safety and functional mobility.       Start:  09/12/24    Expected End:  09/26/24               TRANSFERS       Patient will perform bed mobility minimal assist  level of assistance and bed rails in order to improve safety and independence with mobility       Start:  09/12/24    Expected End:  09/26/24            Patient will complete sit to stand transfer with supervision level of assistance and least restrictive device in order to improve safety and prepare for out of bed mobility.       Start:  09/12/24    Expected End:  09/26/24                   Treatment Completed on Evaluation    Activities of Daily Living:      Toileting  Toileting Comments: Pt required DEP assist to comlete hygiene after bowel incontinence; assistance required to stand while receiving hygiene care    Therapy/Activity:     Therapeutic Activity  Therapeutic Activity Performed: Yes  Therapeutic Activity 1: Functional mobility and transfer training as noted requiring skilled assistance and cueing for safety and training  Therapeutic Activity 2: Education on safety concerns with going home and increased assistance that is likely required  Therapeutic Activity 3: Education and training on compensatory techniques    09/12/24 at 4:01 PM   Reji Scott OT   Rehab Office: [phone]

## 2024-09-12 NOTE — ASSESSMENT & PLAN NOTE
The patient is a 69 year old female who is admitted to pre-op optimization and treatment of asthma exacerbation. Endocrinology is consulted to manage persistent hyperglycemia in the light of steroid use. Reviewing her current trend, her prandial sugars are concerning but they are related to steroid use, thus we will target those given the steroid dose remains consistent. According to pulmonology, she will be switched to prednisone 40 mg daily starting 09/13 for 7 days.    - Goal -180  - Rx Glargine 48 U HS  - Rx prandial Lispro 30 U TID before meals   - Accuchecks (not BMP) TID and QHS- kindly ensure QHS Accucheck is drawn; it is often missed  - Change SS to #2 (2U of Lispro for every 50 above 150) TID w/ meals only; avoid night time correction   - Hypoglycemia protocol  - Diabetic Diet- low carb 60 CHO  - Will continue to follow and titrate insulin accordingly  - Please contact when pt's diet changes or if TF/D5 are started, and 1-2 days prior to anticipated discharge for optimal planning and transition to home regimen  - Please ensure adequate home supplies/ provide rx for insulin pens, needles, glucometer, test strips, alcohol swabs, lancets  - Consider rx for glucagon (nasal powder 3mg)  - Will refer to pharmacy for close follow up after discharge  - Follow up with Endocrinology 1-2w after discharge. She prefers to follow up with an endocrinologist in [place], [place] but there are no  providers there. Patient to call and make an appointment herself (will provide patient with options). However, she should also follow up with PCP in 1 week for SYLVIA to make adjustments to her insulin regimen if needed during the time she waits for an endocrinologist.

## 2024-09-12 NOTE — CONSULTS
Inpatient consult to Endocrinology  Consult performed by: Seth Luevano MD  Consult ordered by: Marino Oliver MD  Reason for consult: poorly controlled T2DM, A2C of 9,6 and now needing Steroids for acute severe asthma,          Reason For Consult    poorly controlled T2DM, A2C of 9,6 and now needing Steroids for acute severe asthma,    History Of Present Illness  Abbie Mayo is a 69 y.o. female presenting with a past medical history significant of but not limited to HTN, HLD, asthma, IDT2DM, MI, wheelchair bound due to progressive thoracic myelopathy presented to the ED on 09/09 because of worsening SOB and productive cough that started 3 days PTA. The patient was initially scheduled for T10-T11 laminectomy facetectomy with T10-T11 posterior nonsegmental instrumented fusion with L2-L3 minimally invasive laminectomy on 09/12 with neurosurgery and was admitted to the hospital for pre-op optimization along with treatment of asthma exacerbation. Endocrinology is consulted due to persistent hyperglycemia in the light of steroid use.    Diabetes Diagnosis: 1999  Diabetes duration: 20+ years   Home blood glucose checks: Yes  Hypoglycemia (y/n, threshold and symptoms): no  Home diabetes regimen: Lantus 48 U, Lispro 20 U TID, Ozempic 0.25 mg Qweekly which was recently started and the patient has not taken, Metformin 250 mg BID  Home diet (how many meals a day): More snacks than proper meals, drinks diet sodas  Outpatient physician managing diabetes: None, had an endocrinologist before but no physician seen lately  Macrovascular complications: CAD  Microvascular complications: + neuropathy, + retinopathy  Last A1c: 10.6  Inpatient diabetes regimen: Lantus 48 U, Lispro 20 U TID, SS 4:50 > 150  Inpatient diet: CCD < 60 gms a day     Upon my evaluation today, the patient stated that she is feeling better compared to when she came in. She does not exercise because of her neurological condition, however is looking forward to  "the rehabilitation to help her get back on her feet. She states she does not have an endocrinologist as she wanted to cut back on the number of physician visits, however is agreeable to have one post discharge. She wants to be closer to [place], where she lives. She does have an ophthalmologist who she saw about 1 year ago. Her surgery has also been rescheduled, and will now happen in 4 weeks once her breathing is optimized. She had no acute concerns otherwise.    Past Medical History  She has a past medical history of Anxiety, Asthma ([facility]), Complaining of cough, Coronary artery disease, History of chronic ischemic heart disease, Hyperlipidemia, Hypertension, Lumbar radiculopathy, Myocardial infarction (Multi), Pseudoclaudication syndrome, Shortness of breath, Spinal stenosis, and Type 2 diabetes mellitus (Multi).    Surgical History  She has a past surgical history that includes Iridotomy / iridectomy; Cholecystectomy; Cataract extraction; Cardiac catheterization; and Colonoscopy.     Social History  She reports that she has never smoked. She has never used smokeless tobacco. She reports that she does not drink alcohol and does not use drugs.    Family History  No family history on file.     Allergies  Suvorexant    Review of Systems  Negative unless noted above    Physical Exam    General: obese AA female patient in NAD  HEENT: AT/NC  Neck: trachea in midline, no thyromegaly or nodules, no signs of acanthosis  Resp: CTA B/L  CVS: normal s1 and s2  Abdomen: soft and non tender to palpation, BS+  Skin: warm, dry and intact, no visible skin tags  Neuro: AAO x3, CN 2-12 grossly intact  Psych: cooperative    ROS, PMH, FH/SH, surgical history and allergies have been reviewed.    Last Recorded Vitals  Blood pressure 153/81, pulse 81, temperature 35 °C (95 °F), resp. rate 24, height 1.676 m (5' 6\"), weight 99.8 kg (220 lb), SpO2 93%, peak flow 250 L/min.    Relevant Results  Results from last 7 days   Lab Units " 24  0647 24  0537 24  0425 24  0333 24  0223 24  2044 24  1407 24  1107   POCT GLUCOSE mg/dL 144* 209* 342* 423* 545*   < >  --   --    GLUCOSE mg/dL  --   --   --   --   --   --  256* 289*    < > = values in this interval not displayed.     Lab Results   Component Value Date    HGBA1C 10.6 (H) 2024         No current facility-administered medications on file prior to encounter.     Current Outpatient Medications on File Prior to Encounter   Medication Sig Dispense Refill    albuterol 90 mcg/actuation inhaler Inhale 2 puffs every 6 hours if needed for wheezing or shortness of breath.      chlorhexidine (Hibiclens) 4 % external liquid Apply 1 Application topically once daily for 5 days. Use per CPM/PAT provided instructions 473 mL 0    [] chlorhexidine (Peridex) 0.12 % solution Use 15 mL in the mouth or throat once daily for 2 doses. 473 mL 0    ergocalciferol (Vitamin D-2) 1.25 MG (84670 UT) capsule Take 1 capsule (1,250 mcg) by mouth 1 (one) time per week. Every Monday      gabapentin (Neurontin) 300 mg capsule Take 1 capsule (300 mg) by mouth 2 times a day.      HumaLOG KwikPen Insulin 100 unit/mL injection 20 Units 3 times daily (morning, midday, late afternoon). Take with meals      hydroCHLOROthiazide (HYDRODiuril) 25 mg tablet Take 1 tablet (25 mg) by mouth once daily.      Lantus Solostar U-100 Insulin 100 unit/mL (3 mL) pen Inject 48 Units under the skin once daily at bedtime.      latanoprost (Xalatan) 0.005 % ophthalmic solution Administer 1 drop into the right eye 2 times a day.      losartan (Cozaar) 50 mg tablet Take 1 tablet (50 mg) by mouth once daily.      metFORMIN (Glucophage) 500 mg tablet Take 0.5 tablets (250 mg) by mouth 2 times daily (morning and late afternoon).      metoprolol tartrate (Lopressor) 50 mg tablet Take 1.5 tablets by mouth 2 times a day.      Ozempic 0.25 mg or 0.5 mg (2 mg/3 mL) pen injector Inject 0.25 mg under the  skin 1 (one) time per week. Every Sunday      rosuvastatin (Crestor) 40 mg tablet Take 1 tablet (40 mg) by mouth once daily at bedtime.      verapamil (Calan) 120 mg tablet Take 1 tablet (120 mg) by mouth 3 times a day.      white petrolatum-mineral oiL (Tears Naturale PM) 94-3 % ophthalmic ointment Apply 1 Application to left eye if needed for dry eyes.      zolpidem (Ambien) 10 mg tablet Take 1 tablet (10 mg) by mouth once daily at bedtime.      [DISCONTINUED] amitriptyline (Elavil) 10 mg tablet Take 1 tablet (10 mg) by mouth once daily at bedtime.      [DISCONTINUED] aspirin 81 mg EC tablet Take 1 tablet (81 mg) by mouth every other day.      [DISCONTINUED] atorvastatin (Lipitor) 20 mg tablet Take 1 tablet (20 mg) by mouth once daily.      [DISCONTINUED] INSULIN LISPRO SUBQ Inject 15 Units under the skin once daily.      [DISCONTINUED] isosorbide mononitrate ER (Imdur) 30 mg 24 hr tablet Take 1 tablet (30 mg) by mouth once daily.            Assessment/Plan   Assessment & Plan  Thoracic myelopathy    Type 2 diabetes mellitus (Multi)  The patient is a 69 year old female who is admitted to pre-op optimization and treatment of asthma exacerbation. Endocrinology is consulted to manage persistent hyperglycemia in the light of steroid use. Reviewing her current trend, her prandial sugars are concerning but they are related to steroid use, thus we will target those given the steroid dose remains consistent. According to pulmonology, she will be switched to prednisone 40 mg daily starting 09/13 for 7 days.    - Goal -180  - Rx Glargine 48 U HS  - Rx prandial Lispro 30 U TID before meals   - Accuchecks (not BMP) TID and QHS- kindly ensure QHS Accucheck is drawn; it is often missed  - Change SS to #2 (2U of Lispro for every 50 above 150) TID w/ meals only; avoid night time correction   - Hypoglycemia protocol  - Diabetic Diet- low carb 60 CHO  - Will continue to follow and titrate insulin accordingly  - Please contact  when pt's diet changes or if TF/D5 are started, and 1-2 days prior to anticipated discharge for optimal planning and transition to home regimen  - Please ensure adequate home supplies/ provide rx for insulin pens, needles, glucometer, test strips, alcohol swabs, lancets  - Consider rx for glucagon (nasal powder 3mg)  - Will refer to pharmacy for close follow up after discharge  - Follow up with Endocrinology 1-2w after discharge. She prefers to follow up with an endocrinologist in [place], [place] but there are no  providers there. Patient to call and make an appointment herself (will provide patient with options). However, she should also follow up with PCP in 1 week for SYLVIA to make adjustments to her insulin regimen if needed during the time she waits for an endocrinologist.    Thank you for this consult. The patient was seen and discussed with Dr. Luu. Please reach out incase of any questions or concerns.      Seth Luevano MD  PGY -4 Endocrinology Fellow

## 2024-09-12 NOTE — CONSULTS
Inpatient consult to Medicine  Consult performed by: Justina Bennett DO  Consult ordered by: Marino Oliver MD  Reason for consult: Transfer to medicine        Reason For Consult  Transfer to medicine    History Of Present Illness  Abbie Mayo is a 69 y.o. female  with history of chronic thoracic myelopathy with plan for T10-11 laminectomy on 9/12 with neurosurgery presenting on 9/10 with 3 weeks of productive cough and wheezing refractory to home albuterol inhaler and course of azithromycin.  Neurosurgery delayed operation until asthma exacerbation resolved.      Patient has been treated for asthma exacerbation inpatient with montelukast, DuoNeb, albuterol inhaler, Solu-Medrol.  She went for PFTs on 9/12.  CT angio negative for PE.    Hospital course C/B uncontrolled blood glucose in the setting of IV steroids.  Blood sugars have ranged from 100s to 500s.     Past Medical History  She has a past medical history of Anxiety, Asthma ([facility]), Complaining of cough, Coronary artery disease, History of chronic ischemic heart disease, Hyperlipidemia, Hypertension, Lumbar radiculopathy, Myocardial infarction (Multi), Pseudoclaudication syndrome, Shortness of breath, Spinal stenosis, and Type 2 diabetes mellitus (Multi).  T2DM, MI, HTN, HLD, asthma, obesity    Surgical History  She has a past surgical history that includes Iridotomy / iridectomy; Cholecystectomy; Cataract extraction; Cardiac catheterization; and Colonoscopy.     Social History  She reports that she has never smoked. She has never used smokeless tobacco. She reports that she does not drink alcohol and does not use drugs.    Family History  No family history on file.     Allergies  Suvorexant    Review of Systems  Review of Systems   Respiratory:  Positive for cough and wheezing.    Cardiovascular:  Negative for chest pain, palpitations and leg swelling.   Gastrointestinal:  Negative for abdominal pain.         Physical Exam  Patient was unable to stop coughing  so cardiovascular and pulmonary exams were delayed.   Abdomen soft, nontender       Last Recorded Vitals  /84 (BP Location: Right arm, Patient Position: Lying)   Pulse 81   Temp 35 °C (95 °F)   Resp 21   Wt 99.8 kg (220 lb)   SpO2 96%    L/min     Relevant Results  CT angio chest for pulmonary embolism    Result Date: 9/12/2024  1. No evidence of acute pulmonary embolism. Assessment is however limited due to suboptimal opacification of the pulmonary artery. 2. Mildly dilated main pulmonary artery which can be seen in pulmonary artery hypertension.     MACRO: None   Signed by: Arianna Hernández 9/12/2024 1:54 PM Dictation workstation:   [id number]    XR chest 2 views    Result Date: 9/9/2024  No evidence of acute cardiopulmonary process.   MACRO: None   Signed by: Pascale Dorsey 9/9/2024 4:22 PM Dictation workstation:   [id number]         Assessment/Plan     This is a 69-year-old female with PMH of HTN, HLD, previous MI, T2DM, asthma with history of exacerbations, severe spinal stenosis of thoracic spine who presented on 9/10 with shortness of breath and cough prior to anticipated surgery with neurosurgery for T10-11 laminectomy.  Asthma exacerbation has been treated inpatient with montelukast, DuoNebs, albuterol inhaler, Solu-Medrol 60 mg every 6 hours.  CT angio on 9/12 negative for PE.  Patient went for PFTs on 9/12 AM.  Since admission, patient's blood glucose has ranged from 100s to 500s in the setting of IV steroids.  Medicine is consulted for transfer of service.    Recommendations:  As patient's asthma exacerbation has not resolved and she does not require emergent neurosurgery, she is an appropriate transfer to medicine for further treatment of asthma exacerbation and treatment of uncontrolled diabetes.    Patient was seen by and staffed with Dr. Chang.  Recommendations are not final until attending signs note    Justina Bennett DO  Anesthesiology, PGY-1

## 2024-09-12 NOTE — CARE PLAN
Abbie Mayo is a 70 yo F with a PMHx of asthma, HTN, HLD, previous MI, PVC's, and severe spinal stenosis who was admitted to the ED from pre-op testing with shortness of breath. She is tentatively scheduled to undergo T10-T11 laminectomy facetectomy with L2-L3 laminectomy on 9/13. Pt presenting with acute asthma exacerbation being treated with Duo-Nebs q6h and SoluMedrol 60 mg q6h. Pulmonology consulted for medical optimization prior to surgery.      Patient's symptoms and presentation c/w acute asthma exacerbation, but pt's dyspnea and chronic cough may be due to other factors such as allergic rhinits, GERD, obesity hypoventilation syndrome, COPD as well. Unfortunately, patient was only able to complete bedside spirometry due to functional status today.     #C/f reactive airway disease  #C/f asthma exacerbation  #Chronic cough  #Severe thoracic spinal stenosis     Updates 9/12:  -Recommend discontinuing IV solumedrol and starting prednisone 40 mg daily to finish 7 day course (last dose 9/16)  -Recommend discharging with nebulizer machine  -Will reassess pulmonary status tomorrow. Pt will need likely triple therapy inhaler/nebs   -Will continue to follow    Blake Warren MD   Internal Medicine PGY-1

## 2024-09-12 NOTE — PROGRESS NOTES
Physical Therapy    Physical Therapy Evaluation & Treatment    Patient Name: Abbie Mayo  MRN: [id number]  Department: [place] [address]  Room: [address] [place] [address]  Today's Date: 9/12/2024   Time Calculation  Start Time: 1550  Stop Time: 1619  Time Calculation (min): 29 min    Assessment/Plan   PT Assessment  PT Assessment Results: Decreased strength, Impaired balance, Decreased mobility, Decreased coordination, Impaired sensation  Rehab Prognosis: Good  Evaluation/Treatment Tolerance: Patient tolerated treatment well  Medical Staff Made Aware: Yes  Strengths: Support of Caregivers  End of Session Communication: Bedside nurse  Assessment Comment: 70 yo female, reports Ind with mobility 3 years ago, with progressive decline.  Most recently, ambulated with assist short household distances with assist.  Currently, limited by deficits in strength, balance and sensation, requiring Mod A for bed mobility and Max A to stand.  Would benefit from cont PT while in hosp to address deficits, and maximize functional mobility.  Pt is appropriate for Mod Intensity PT based on assist levels, but wishes to return home with assist of family.  If family able to accomodate pt at current level of function, recc Low intensity PT with family assist.  If family not able to provide at current level, recc Mod intensity.  End of Session Patient Position: Bed, 3 rail up, Alarm off, not on at start of session   IP OR SWING BED PT PLAN  Inpatient or Swing Bed: Inpatient  PT Plan  Treatment/Interventions: Bed mobility, Transfer training, Gait training, Balance training, Stair training, Therapeutic exercise, Therapeutic activity  PT Plan: Ongoing PT  PT Frequency: 5 times per week  PT Discharge Recommendations:  (Low vs Mod, see Assessment Comment)  PT Recommended Transfer Status: Assist x2, Assistive device  PT - OK to Discharge: Yes (Pt has been evaluated and d/c recc is in place)    Subjective     General Visit Information:  General  Reason for Referral: r/o  out PNA (SOB, productive cough) prior to anticipated  T10-T11 laminectomy, surgery now delayed 2/2 pulmonary concerns  Past Medical History Relevant to Rehab: asthma, HTN, HLD, MI , spinal stenosis with thoracic myelopathy, PVCs, DM, asthma, anxiety, CAD, lumbar radiculopathy  Missed Visit: No  Missed Visit Reason:  (-)  Family/Caregiver Present: No  Prior to Session Communication: Bedside nurse  Patient Position Received: Bed, 3 rail up, Alarm off, not on at start of session  General Comment: Supine.  Pt pleasant, cooperative and agreeable to PT.  Pt had busy day but was able to sit EOB and stand with Max A.  Tolerated well.  Home Living:  Home Living  Type of Home: House  Lives With: Spouse, Adult children  Home Adaptive Equipment: Walker rolling or standard (& rollator)  Home Layout: One level, Laundry in basement, Able to live on main level with bedroom/bathroom  Home Access: Stairs to enter with rails  Entrance Stairs-Rails: Left  Entrance Stairs-Number of Steps: 3  Prior Level of Function:  Prior Function Per Pt/Caregiver Report  Level of [place]: Needs assistance with ADLs  Ambulatory Assistance: Needs assistance (short household distances with rollator)  Precautions:  Precautions  Medical Precautions: Fall precautions  Precautions Comment: Reviewed spinal precautions and followed during mobility to familiar pt for upcoming surgery.    Objective   Pain:  Pain Assessment  Pain Assessment:  (no reports of pain during visit)  Cognition:  Cognition  Arousal/Alertness: Appropriate responses to stimuli  Orientation Level: Oriented X4  Following Commands: Follows all commands and directions without difficulty    General Assessments:    Activity Tolerance  Endurance: Tolerates 10 - 20 min exercise with multiple rests    Sensation  Light Touch: Severe deficits in the RLE, Severe deficits in the LLE    Strength  Strength Comments: B hip 2-/5, knee 2+/5, DF 2-/5, PF 2+/5  Strength  Strength Comments: B hip 2-/5,  knee 2+/5, DF 2-/5, PF 2+/5    Coordination  Movements are Fluid and Coordinated: Yes (BUE, but limited motor control BLE)    Postural Control  Postural Control:  (kyphotic with forward head, improvement with VCs)    Static Sitting Balance  Static Sitting-Level of Assistance: Close supervision  Dynamic Sitting Balance  Dynamic Sitting-Level of Assistance: Contact guard    Static Standing Balance  Static Standing-Level of Assistance: Maximum assistance  Functional Assessments:     Bed Mobility 1  Bed Mobility 1: Supine to sitting, Sitting to supine, Log roll  Level of Assistance 1: Moderate assistance, Moderate verbal cues, Moderate tactile cues    Transfer 1  Transfer From 1: Sit to, Stand to  Transfer to 1: Sit  Transfer Device 1:  (PT in front of pt supporting trunk, blocking BLE)  Transfer Level of Assistance 1: Maximum assistance, Moderate verbal cues, Moderate tactile cues    Ambulation/Gait Training  Ambulation/Gait Training Performed: No (Pt declining.  Had already taken steps with OT earlier, and then to transport cart and was tired.)    Treatments:    Therapeutic Activity  Therapeutic Activity 1: Issued and reviewed HOs for log roll and spinal precautions to help familiarize pt, to optimize her recovery when she does have surgery in the future.    Bed Mobility 1  Bed Mobility 1: Supine to sitting, Sitting to supine, Log roll  Level of Assistance 1: Moderate assistance, Moderate verbal cues, Moderate tactile cues    Ambulation/Gait Training  Ambulation/Gait Training Performed: No (Pt declining.  Had already taken steps with OT earlier, and then to transport cart and was tired.)  Transfer 1  Transfer From 1: Sit to, Stand to  Transfer to 1: Sit  Transfer Device 1:  (PT in front of pt supporting trunk, blocking BLE)  Transfer Level of Assistance 1: Maximum assistance, Moderate verbal cues, Moderate tactile cues    Outcome Measures:    [facility] Basic Mobility  Turning from your back to your side while in a flat  bed without using bedrails: A lot  Moving from lying on your back to sitting on the side of a flat bed without using bedrails: A lot  Moving to and from bed to chair (including a wheelchair): A lot  Standing up from a chair using your arms (e.g. wheelchair or bedside chair): A lot  To walk in hospital room: A lot  Climbing 3-5 steps with railing: Total  Basic Mobility - Total Score: 11    Encounter Problems       Encounter Problems (Active)       Balance       Sitting EOB 20 minutes, dist supv for static, close supv for dynamic (Progressing)       Start:  09/12/24    Expected End:  09/26/24            Standing >5 minutes, with LRD, Min A (Progressing)       Start:  09/12/24    Expected End:  09/26/24               Mobility       Ambulate >5 feet with LRD and Min A  (Progressing)       Start:  09/12/24    Expected End:  09/26/24            Up/dn 3 stairs, Max A (Progressing)       Start:  09/12/24    Expected End:  09/26/24               PT Transfers       Pt able to perform bed mobility CGA from a flat bed without rail.  (Progressing)       Start:  09/12/24    Expected End:  09/26/24            sit<>stand, bed<>chair, LRD, Min A (Progressing)       Start:  09/12/24    Expected End:  09/26/24                   Education Documentation  Handouts, taught by Juan Cole PT at 9/12/2024  5:23 PM.  Learner: Patient  Readiness: Acceptance  Method: Explanation  Response: Verbalizes Understanding    Precautions, taught by Juan Cole, PT at 9/12/2024  5:23 PM.  Learner: Patient  Readiness: Acceptance  Method: Explanation  Response: Verbalizes Understanding    Body Mechanics, taught by Juan Cole, PT at 9/12/2024  5:23 PM.  Learner: Patient  Readiness: Acceptance  Method: Explanation  Response: Verbalizes Understanding    Mobility Training, taught by Juan Cole PT at 9/12/2024  5:23 PM.  Learner: Patient  Readiness: Acceptance  Method: Explanation  Response: Verbalizes Understanding    Body  Mechanics, taught by Juan Cole, PT at 9/12/2024  5:23 PM.  Learner: Patient  Readiness: Acceptance  Method: Explanation  Response: Verbalizes Understanding    Precautions, taught by Juan Cole, PT at 9/12/2024  5:23 PM.  Learner: Patient  Readiness: Acceptance  Method: Explanation  Response: Verbalizes Understanding    ADL Training, taught by Juan Cole, PT at 9/12/2024  5:23 PM.  Learner: Patient  Readiness: Acceptance  Method: Explanation  Response: Verbalizes Understanding    Education Comments  No comments found.

## 2024-09-12 NOTE — PROGRESS NOTES
"Abbie Mayo is a 69 y.o. female on day 3 of admission presenting with Thoracic myelopathy.    Subjective   Patient was hyperglycemic to >515. Now <200. No other issues        Objective     Physical Exam  Awake, Ox3, face symmetric  Ox3  BUE 5/5  RLE HF4 KE/PF/DF4+  LLE HF3 KE4 PF/DF4+    Last Recorded Vitals  Blood pressure 153/81, pulse 81, temperature 35 °C (95 °F), resp. rate 24, height 1.676 m (5' 6\"), weight 99.8 kg (220 lb), SpO2 93%, peak flow 250 L/min.  Intake/Output last 3 Shifts:  I/O last 3 completed shifts:  In: 1240 (12.4 mL/kg) [P.O.:240; IV Piggyback:1000]  Out: 1300 (13 mL/kg) [Urine:1300 (0.4 mL/kg/hr)]  Weight: 99.8 kg     Relevant Results                   Results for orders placed or performed during the hospital encounter of 09/09/24 (from the past 24 hour(s))   Transthoracic Echo (TTE) Complete   Result Value Ref Range    AV pk aneesh 1.31 m/s    LVOT diam 2.00 cm    MV E/A ratio 0.74     LV EF 62 %    RV free wall pk S' 6.84 cm/s    LVIDd 4.50 cm    Aortic Valve Area by Continuity of Peak Velocity 2.54 cm2    AV pk grad 6.9 mmHg    LV A4C EF 58.4    POCT GLUCOSE   Result Value Ref Range    POCT Glucose 352 (H) 74 - 99 mg/dL   POCT GLUCOSE   Result Value Ref Range    POCT Glucose 378 (H) 74 - 99 mg/dL   POCT GLUCOSE   Result Value Ref Range    POCT Glucose 522 (H) 74 - 99 mg/dL   POCT GLUCOSE   Result Value Ref Range    POCT Glucose 545 (H) 74 - 99 mg/dL   POCT GLUCOSE   Result Value Ref Range    POCT Glucose 423 (H) 74 - 99 mg/dL   POCT GLUCOSE   Result Value Ref Range    POCT Glucose 342 (H) 74 - 99 mg/dL   POCT GLUCOSE   Result Value Ref Range    POCT Glucose 209 (H) 74 - 99 mg/dL   POCT GLUCOSE   Result Value Ref Range    POCT Glucose 144 (H) 74 - 99 mg/dL                Assessment/Plan   Assessment & Plan  Thoracic myelopathy    Abbie Mayo is a 69 y.o. female with a history of asthma, HTN, HLD, prior MI, DM, progressive thoracic myelopathy, now wheelchair bound who presents with " shortness of breath prior to anticipated surgery 9/12.        9/11 TTE nondiagnostic, EF approx 60-65%,   9/11 BG >500 s/p 40u lispro, fluid bolus, K replacement, , 10u lispro, methyl pred held    Plan   tele,   OR likely as OP  sidagam recs-RCRI II, needs pulm optimization,   pulm recs-PFTs this AM,  BLE Dvt US   PTOT        Kathleen Hernandez MD

## 2024-09-13 ENCOUNTER — PHARMACY VISIT (OUTPATIENT)
Dept: PHARMACY | Facility: CLINIC | Age: 69
End: 2024-09-13
Payer: COMMERCIAL

## 2024-09-13 VITALS
BODY MASS INDEX: 35.36 KG/M2 | HEART RATE: 82 BPM | RESPIRATION RATE: 26 BRPM | TEMPERATURE: 98.2 F | DIASTOLIC BLOOD PRESSURE: 78 MMHG | HEIGHT: 66 IN | WEIGHT: 220 LBS | SYSTOLIC BLOOD PRESSURE: 163 MMHG | OXYGEN SATURATION: 94 %

## 2024-09-13 LAB
GLUCOSE BLD MANUAL STRIP-MCNC: 162 MG/DL (ref 74–99)
GLUCOSE BLD MANUAL STRIP-MCNC: 176 MG/DL (ref 74–99)
GLUCOSE BLD MANUAL STRIP-MCNC: 188 MG/DL (ref 74–99)
GLUCOSE BLD MANUAL STRIP-MCNC: 99 MG/DL (ref 74–99)

## 2024-09-13 PROCEDURE — 82947 ASSAY GLUCOSE BLOOD QUANT: CPT

## 2024-09-13 PROCEDURE — 2500000002 HC RX 250 W HCPCS SELF ADMINISTERED DRUGS (ALT 637 FOR MEDICARE OP, ALT 636 FOR OP/ED)

## 2024-09-13 PROCEDURE — 2500000001 HC RX 250 WO HCPCS SELF ADMINISTERED DRUGS (ALT 637 FOR MEDICARE OP): Performed by: ANESTHESIOLOGY

## 2024-09-13 PROCEDURE — 99239 HOSP IP/OBS DSCHRG MGMT >30: CPT | Performed by: STUDENT IN AN ORGANIZED HEALTH CARE EDUCATION/TRAINING PROGRAM

## 2024-09-13 PROCEDURE — RXMED WILLOW AMBULATORY MEDICATION CHARGE

## 2024-09-13 PROCEDURE — 2500000004 HC RX 250 GENERAL PHARMACY W/ HCPCS (ALT 636 FOR OP/ED)

## 2024-09-13 PROCEDURE — 2500000002 HC RX 250 W HCPCS SELF ADMINISTERED DRUGS (ALT 637 FOR MEDICARE OP, ALT 636 FOR OP/ED): Performed by: PHYSICIAN ASSISTANT

## 2024-09-13 PROCEDURE — 99231 SBSQ HOSP IP/OBS SF/LOW 25: CPT | Performed by: NEUROLOGICAL SURGERY

## 2024-09-13 PROCEDURE — 2500000004 HC RX 250 GENERAL PHARMACY W/ HCPCS (ALT 636 FOR OP/ED): Performed by: PHYSICIAN ASSISTANT

## 2024-09-13 PROCEDURE — 2500000001 HC RX 250 WO HCPCS SELF ADMINISTERED DRUGS (ALT 637 FOR MEDICARE OP)

## 2024-09-13 PROCEDURE — 2500000002 HC RX 250 W HCPCS SELF ADMINISTERED DRUGS (ALT 637 FOR MEDICARE OP, ALT 636 FOR OP/ED): Performed by: NEUROLOGICAL SURGERY

## 2024-09-13 PROCEDURE — 2500000001 HC RX 250 WO HCPCS SELF ADMINISTERED DRUGS (ALT 637 FOR MEDICARE OP): Performed by: PHYSICIAN ASSISTANT

## 2024-09-13 PROCEDURE — 2500000002 HC RX 250 W HCPCS SELF ADMINISTERED DRUGS (ALT 637 FOR MEDICARE OP, ALT 636 FOR OP/ED): Performed by: STUDENT IN AN ORGANIZED HEALTH CARE EDUCATION/TRAINING PROGRAM

## 2024-09-13 RX ORDER — INSULIN PUMP SYRINGE, 3 ML
1 EACH MISCELLANEOUS 4 TIMES DAILY
Qty: 1 EACH | Refills: 1 | Status: SHIPPED | OUTPATIENT
Start: 2024-09-13

## 2024-09-13 RX ORDER — DEXTROSE 50 % IN WATER (D50W) INTRAVENOUS SYRINGE
12.5
Status: DISCONTINUED | OUTPATIENT
Start: 2024-09-13 | End: 2024-09-13 | Stop reason: HOSPADM

## 2024-09-13 RX ORDER — GUAIFENESIN 600 MG/1
600 TABLET, EXTENDED RELEASE ORAL 2 TIMES DAILY PRN
Qty: 30 TABLET | Refills: 0 | Status: SHIPPED | OUTPATIENT
Start: 2024-09-13

## 2024-09-13 RX ORDER — INSULIN LISPRO 100 [IU]/ML
20 INJECTION, SOLUTION INTRAVENOUS; SUBCUTANEOUS
Qty: 15 ML | Refills: 5 | Status: SHIPPED | OUTPATIENT
Start: 2024-09-13 | End: 2024-09-13

## 2024-09-13 RX ORDER — ALBUTEROL SULFATE 90 UG/1
2 INHALANT RESPIRATORY (INHALATION) EVERY 4 HOURS
Qty: 17 G | Refills: 11 | Status: SHIPPED | OUTPATIENT
Start: 2024-09-13

## 2024-09-13 RX ORDER — INSULIN LISPRO 100 [IU]/ML
0-10 INJECTION, SOLUTION INTRAVENOUS; SUBCUTANEOUS
Status: DISCONTINUED | OUTPATIENT
Start: 2024-09-13 | End: 2024-09-13 | Stop reason: HOSPADM

## 2024-09-13 RX ORDER — PREDNISONE 20 MG/1
40 TABLET ORAL DAILY
Qty: 10 TABLET | Refills: 0 | Status: SHIPPED | OUTPATIENT
Start: 2024-09-14 | End: 2024-09-19

## 2024-09-13 RX ORDER — INSULIN GLARGINE 100 [IU]/ML
48 INJECTION, SOLUTION SUBCUTANEOUS NIGHTLY
Qty: 15 ML | Refills: 3 | Status: SHIPPED | OUTPATIENT
Start: 2024-09-13 | End: 2024-09-13

## 2024-09-13 RX ORDER — ALBUTEROL SULFATE 90 UG/1
2 INHALANT RESPIRATORY (INHALATION) EVERY 4 HOURS
Status: DISCONTINUED | OUTPATIENT
Start: 2024-09-13 | End: 2024-09-13

## 2024-09-13 RX ORDER — INSULIN LISPRO 100 [IU]/ML
20 INJECTION, SOLUTION INTRAVENOUS; SUBCUTANEOUS
Qty: 15 ML | Refills: 1 | Status: SHIPPED | OUTPATIENT
Start: 2024-09-13

## 2024-09-13 RX ORDER — MONTELUKAST SODIUM 10 MG/1
10 TABLET ORAL NIGHTLY
Qty: 30 TABLET | Refills: 1 | Status: SHIPPED | OUTPATIENT
Start: 2024-09-13

## 2024-09-13 RX ORDER — PANTOPRAZOLE SODIUM 40 MG/1
40 TABLET, DELAYED RELEASE ORAL
Qty: 5 TABLET | Refills: 0 | Status: SHIPPED | OUTPATIENT
Start: 2024-09-14

## 2024-09-13 RX ORDER — BUDESONIDE AND FORMOTEROL FUMARATE DIHYDRATE 160; 4.5 UG/1; UG/1
2 AEROSOL RESPIRATORY (INHALATION)
Qty: 10.2 G | Refills: 11 | Status: SHIPPED | OUTPATIENT
Start: 2024-09-13 | End: 2024-09-13 | Stop reason: HOSPADM

## 2024-09-13 RX ORDER — ALBUTEROL SULFATE 90 UG/1
2 INHALANT RESPIRATORY (INHALATION) EVERY 4 HOURS PRN
Status: DISCONTINUED | OUTPATIENT
Start: 2024-09-13 | End: 2024-09-13 | Stop reason: HOSPADM

## 2024-09-13 RX ORDER — SULFAMETHOXAZOLE AND TRIMETHOPRIM 800; 160 MG/1; MG/1
1 TABLET ORAL EVERY 12 HOURS SCHEDULED
Qty: 4 TABLET | Refills: 0 | Status: SHIPPED | OUTPATIENT
Start: 2024-09-13 | End: 2024-09-15

## 2024-09-13 RX ORDER — DEXTROSE 50 % IN WATER (D50W) INTRAVENOUS SYRINGE
25
Status: DISCONTINUED | OUTPATIENT
Start: 2024-09-13 | End: 2024-09-13 | Stop reason: HOSPADM

## 2024-09-13 RX ORDER — IPRATROPIUM BROMIDE AND ALBUTEROL SULFATE 2.5; .5 MG/3ML; MG/3ML
3 SOLUTION RESPIRATORY (INHALATION) EVERY 6 HOURS PRN
Qty: 180 ML | Refills: 11 | Status: SHIPPED | OUTPATIENT
Start: 2024-09-13

## 2024-09-13 RX ORDER — INSULIN GLARGINE 100 [IU]/ML
48 INJECTION, SOLUTION SUBCUTANEOUS NIGHTLY
Qty: 15 ML | Refills: 1 | Status: SHIPPED | OUTPATIENT
Start: 2024-09-13

## 2024-09-13 ASSESSMENT — PAIN - FUNCTIONAL ASSESSMENT
PAIN_FUNCTIONAL_ASSESSMENT: 0-10
PAIN_FUNCTIONAL_ASSESSMENT: 0-10

## 2024-09-13 ASSESSMENT — PAIN SCALES - GENERAL
PAINLEVEL_OUTOF10: 0 - NO PAIN

## 2024-09-13 NOTE — ASSESSMENT & PLAN NOTE
The patient is a 69 year old female who is admitted to pre-op optimization and treatment of asthma exacerbation. Endocrinology is consulted to manage persistent hyperglycemia in the light of steroid use. Reviewing her current trend, her prandial sugars are concerning but they are related to steroid use, thus we will target those given the steroid dose remains consistent. According to pulmonology, she will be switched to prednisone 40 mg daily starting 09/13 for 7 days.    Inpatient regimen:    - Goal -180  - Rx Glargine 48 U HS  - Decrease prandial Lispro to 27 U TID before meals   - Accuchecks (not BMP) TID and QHS- kindly ensure QHS Accucheck is drawn; it is often missed  - Change SS to #2 (2U of Lispro for every 50 above 150) TID w/ meals only; avoid night time correction   - Hypoglycemia protocol  - Diabetic Diet- low carb 60 CHO  - Will continue to follow and titrate insulin accordingly  - Please contact when pt's diet changes or if TF/D5 are started, and 1-2 days prior to anticipated discharge for optimal planning and transition to home regimen  - Please ensure adequate home supplies/ provide rx for insulin pens, needles, glucometer, test strips, alcohol swabs, lancets  - Consider rx for glucagon (nasal powder 3mg)  - Follow up with Endocrinology 1-2w after discharge. She prefers to follow up with an endocrinologist in [place], [place] but there are no  providers there. Patient to call and make an appointment herself (with. Dr Ramos). However, she should also follow up with PCP in 1 week for SYLVIA to make adjustments to her insulin regimen if needed during the time she waits for an endocrinologist.

## 2024-09-13 NOTE — DISCHARGE INSTRUCTIONS
Dear Ms. Mayo,    It was a pleasure to meet you! You were admitted for your scheduled spine surgery but unfortunately your asthma was acting up, so you were treated for an asthma flare first. Here are our recommendations for you after you go home:    - For your asthma:          - You should take 2 puffs of your Albuterol inhaler every 4 hours for the next 2-3 days while your asthma is getting back under control.          - We have also prescribed you Prednisone (steroids to reduce the inflammation in your lungs) to take once a day for the next 5 days.          - I ordered a DuoNebs solution for you to use as you need it for wheezing or trouble breathing. This medication will go through a nebulizer machine, which we will work on ordering for you to have delivered to your home early next week.         - You should start using a combination inhaler called Dulera that is designed to control you asthma - you should use this twice a day every day to keep your asthma under control.    - For your diabetes:         - Take your Glargine (Lantus insulin) 48 U once a day        - Lispro insulin (short-acting): Give yourself 27 U three times a day before meals while you are on steroids, and after that go back to your normal Lispro 20 U before meals.        - Sliding scale Lispro Insulin: You should check your blood sugar before each meal. In addition to the 27 units of Lispro, you should also give yourself an additional 2 units of Lispro per every 50 of blood sugar above 150 (2 units if between 150-200; 4 units if between 200 - 250; 6 units if between 250 - 300, etc)        - Be sure to follow up with Dr. Richard ang in [place]!    - For Neurosurgery:        - Be sure to call Neurosurgery at [phone] to re-schedule your surgery    Please take care & We wish you all the best!  Your Team at

## 2024-09-13 NOTE — PROGRESS NOTES
"Abbie Mayo is a 69 y.o. female on day 4 of admission presenting with Thoracic myelopathy.    Subjective   No acute events overnight       Objective     Physical Exam  Awake, Ox3, face symmetric  Ox3  BUE 5/5  RLE HF4 KE/PF/DF4+  LLE HF3 KE4 PF/DF4+    Last Recorded Vitals  Blood pressure 146/79, pulse 76, temperature 36.7 °C (98.1 °F), temperature source Temporal, resp. rate 18, height 1.676 m (5' 6\"), weight 99.8 kg (220 lb), SpO2 95%, peak flow 250 L/min.  Intake/Output last 3 Shifts:  I/O last 3 completed shifts:  In: 1260 (12.6 mL/kg) [P.O.:260; IV Piggyback:1000]  Out: 1550 (15.5 mL/kg) [Urine:1550 (0.4 mL/kg/hr)]  Weight: 99.8 kg     Relevant Results                   Results for orders placed or performed during the hospital encounter of 09/09/24 (from the past 24 hour(s))   POCT GLUCOSE   Result Value Ref Range    POCT Glucose 209 (H) 74 - 99 mg/dL   POCT GLUCOSE   Result Value Ref Range    POCT Glucose 144 (H) 74 - 99 mg/dL   Pulmonary function testing   Result Value Ref Range    FVC - Predicted 3.03     FEV1 - Predicted 2.35     FVC - PRE 1.91     FEV1 - Pre 1.18     FVC - Post 1.77     FEV1 - Post 1.21    Renal function panel   Result Value Ref Range    Glucose 120 (H) 74 - 99 mg/dL    Sodium 141 136 - 145 mmol/L    Potassium 3.7 3.5 - 5.3 mmol/L    Chloride 106 98 - 107 mmol/L    Bicarbonate 26 21 - 32 mmol/L    Anion Gap 13 10 - 20 mmol/L    Urea Nitrogen 28 (H) 6 - 23 mg/dL    Creatinine 0.95 0.50 - 1.05 mg/dL    eGFR 65 >60 mL/min/1.73m*2    Calcium 9.3 8.6 - 10.6 mg/dL    Phosphorus 2.8 2.5 - 4.9 mg/dL    Albumin 4.0 3.4 - 5.0 g/dL   POCT GLUCOSE   Result Value Ref Range    POCT Glucose 286 (H) 74 - 99 mg/dL   POCT GLUCOSE   Result Value Ref Range    POCT Glucose 216 (H) 74 - 99 mg/dL                Assessment/Plan   Assessment & Plan  Thoracic myelopathy    Type 2 diabetes mellitus (Multi)    Abbie Mayo is a 69 y.o. female with a history of asthma, HTN, HLD, prior MI, DM, progressive " thoracic myelopathy, now wheelchair bound who presents with shortness of breath prior to anticipated surgery 9/12.        9/11 TTE nondiagnostic, EF approx 60-65%,   9/11 BG >500 s/p 40u lispro, fluid bolus, K replacement, , 10u lispro, methyl pred held  9/12 CTPE neg for PE, pulm artery dilation c/f pulm HTN    Plan   tele, possible transfer of care to medicine toay  OR likely as OP  Perioperative medicine recs-RCRI II, needs pulm optimization  pulm recs-nebs/inhalers  Cont dex 40 until 9/16  Continue bactrim for UTI until 9/15  PTOT-low intensity with fam assist vs SNF      Kenn Bell MD

## 2024-09-13 NOTE — PROGRESS NOTES
"Abbie Mayo is a 69 y.o. female on day 4 of admission presenting with Thoracic myelopathy.    Subjective   The patient was seen and evaluated by the bedside this morning. No overnight concerns reported. She felt well and denied any concerns.     I have reviewed histories, allergies and medications have been reviewed and there are no changes       Objective   Review of Systems  Negative unless reported above    Physical Exam  General: obese AA female patient in NAD  HEENT: AT/NC  Neck: trachea in midline, no thyromegaly or nodules, no signs of acanthosis  Resp: CTA B/L  CVS: normal s1 and s2  Abdomen: soft and non tender to palpation, BS+  Skin: warm, dry and intact, no visible skin tags  Neuro: AAO x3, CN 2-12 grossly intact  Psych: cooperative    Last Recorded Vitals  Blood pressure 159/83, pulse 71, temperature 36 °C (96.8 °F), resp. rate 24, height 1.676 m (5' 6\"), weight 99.8 kg (220 lb), SpO2 93%, peak flow 250 L/min.  Intake/Output last 3 Shifts:  I/O last 3 completed shifts:  In: 1260 (12.6 mL/kg) [P.O.:260; IV Piggyback:1000]  Out: 1150 (11.5 mL/kg) [Urine:1150 (0.3 mL/kg/hr)]  Weight: 99.8 kg     Relevant Results  Results from last 7 days   Lab Units 09/13/24  0835 09/12/24  1711 09/12/24  1219 09/12/24  1034 09/12/24  0647 09/12/24  0537 09/09/24  2044 09/09/24  1407 09/09/24  1107   POCT GLUCOSE mg/dL 162* 216* 286*  --  144* 209*   < >  --   --    GLUCOSE mg/dL  --   --   --  120*  --   --   --  256* 289*    < > = values in this interval not displayed.     Lab Results   Component Value Date    HGBA1C 10.6 (H) 09/09/2024     No current facility-administered medications on file prior to encounter.     Current Outpatient Medications on File Prior to Encounter   Medication Sig Dispense Refill    albuterol 90 mcg/actuation inhaler Inhale 2 puffs every 6 hours if needed for wheezing or shortness of breath.      chlorhexidine (Hibiclens) 4 % external liquid Apply 1 Application topically once daily for 5 " days. Use per CPM/PAT provided instructions 473 mL 0    [] chlorhexidine (Peridex) 0.12 % solution Use 15 mL in the mouth or throat once daily for 2 doses. 473 mL 0    ergocalciferol (Vitamin D-2) 1.25 MG (17084 UT) capsule Take 1 capsule (1,250 mcg) by mouth 1 (one) time per week. Every Monday      gabapentin (Neurontin) 300 mg capsule Take 1 capsule (300 mg) by mouth 2 times a day.      HumaLOG KwikPen Insulin 100 unit/mL injection 20 Units 3 times daily (morning, midday, late afternoon). Take with meals      hydroCHLOROthiazide (HYDRODiuril) 25 mg tablet Take 1 tablet (25 mg) by mouth once daily.      Lantus Solostar U-100 Insulin 100 unit/mL (3 mL) pen Inject 48 Units under the skin once daily at bedtime.      latanoprost (Xalatan) 0.005 % ophthalmic solution Administer 1 drop into the right eye 2 times a day.      losartan (Cozaar) 50 mg tablet Take 1 tablet (50 mg) by mouth once daily.      metFORMIN (Glucophage) 500 mg tablet Take 0.5 tablets (250 mg) by mouth 2 times daily (morning and late afternoon).      metoprolol tartrate (Lopressor) 50 mg tablet Take 1.5 tablets by mouth 2 times a day.      Ozempic 0.25 mg or 0.5 mg (2 mg/3 mL) pen injector Inject 0.25 mg under the skin 1 (one) time per week. Every       rosuvastatin (Crestor) 40 mg tablet Take 1 tablet (40 mg) by mouth once daily at bedtime.      verapamil (Calan) 120 mg tablet Take 1 tablet (120 mg) by mouth 3 times a day.      white petrolatum-mineral oiL (Tears Naturale PM) 94-3 % ophthalmic ointment Apply 1 Application to left eye if needed for dry eyes.      zolpidem (Ambien) 10 mg tablet Take 1 tablet (10 mg) by mouth once daily at bedtime.      [DISCONTINUED] amitriptyline (Elavil) 10 mg tablet Take 1 tablet (10 mg) by mouth once daily at bedtime.      [DISCONTINUED] aspirin 81 mg EC tablet Take 1 tablet (81 mg) by mouth every other day.      [DISCONTINUED] atorvastatin (Lipitor) 20 mg tablet Take 1 tablet (20 mg) by mouth once  daily.      [DISCONTINUED] INSULIN LISPRO SUBQ Inject 15 Units under the skin once daily.      [DISCONTINUED] isosorbide mononitrate ER (Imdur) 30 mg 24 hr tablet Take 1 tablet (30 mg) by mouth once daily.                   Assessment/Plan   Assessment & Plan  Thoracic myelopathy    Type 2 diabetes mellitus (Multi)  The patient is a 69 year old female who is admitted to pre-op optimization and treatment of asthma exacerbation. Endocrinology is consulted to manage persistent hyperglycemia in the light of steroid use. Reviewing her current trend, her prandial sugars are concerning but they are related to steroid use, thus we will target those given the steroid dose remains consistent. According to pulmonology, she will be switched to prednisone 40 mg daily starting 09/13 for 7 days.    Inpatient regimen:    - Goal -180  - Rx Glargine 48 U HS  - Decrease prandial Lispro to 27 U TID before meals   - Accuchecks (not BMP) TID and QHS- kindly ensure QHS Accucheck is drawn; it is often missed  - Change SS to #2 (2U of Lispro for every 50 above 150) TID w/ meals only; avoid night time correction   - Hypoglycemia protocol  - Diabetic Diet- low carb 60 CHO  - Will continue to follow and titrate insulin accordingly  - Please contact when pt's diet changes or if TF/D5 are started, and 1-2 days prior to anticipated discharge for optimal planning and transition to home regimen  - Please ensure adequate home supplies/ provide rx for insulin pens, needles, glucometer, test strips, alcohol swabs, lancets  - Consider rx for glucagon (nasal powder 3mg)  - Follow up with Endocrinology 1-2w after discharge. She prefers to follow up with an endocrinologist in [place], [place] but there are no  providers there. Patient to call and make an appointment herself (with. Dr Ramos). However, she should also follow up with PCP in 1 week for SYLVIA to make adjustments to her insulin regimen if needed during the time she waits for an  endocrinologist.    For Discharge:  - Glargine: 48 U every 24 hours  - Lispro: 27 U TID before meals while she is on steroids and after that she can be on Lispro 20 U TID before meals.  - SS 2:50 > 150  - Can continue her home meds like Ozempic 0.25 mg Qweekly and metformin 250 mg BID  - Accuchecks TID and HS  - Advise low carb diet  - Endocrinology Follow up as mentioned above  - Pharmacy follow up    The patient was seen and discussed with Dr. Luu.    Seth Luevano MD  PGY -4 Endocrinology Fellow

## 2024-09-13 NOTE — DISCHARGE SUMMARY
Discharge Diagnosis  Thoracic myelopathy  Asthma exacerbation  T2DM    Issues Requiring Follow-Up  [ ] Neurosurgical Planning: Ms. Mayo strongly preferring to return home prior to surgery; Strongly encouraged her to call to reschedule NSGY ASAP    [ ] Asthma: Started Dulera 2 puffs BID for outpt use; Encouraged PRN controller use; Prescribed 5-day steroid course for current exacerbation    [ ] T2DM: Encouraged to schedule follow-up with Endo closer to home in [place]; Discharged on increased insulin doses per Endo consult team while on steroid regimen    Discharge Meds     Medication List      START taking these medications     Blood glucose monitoring meter kit kit; 1 each 4 times a day.   ipratropium-albuteroL 0.5-2.5 mg/3 mL nebulizer solution; Commonly known   as: Duo-Neb; Take 3 mL by nebulization every 6 hours if needed for   wheezing or shortness of breath.   mometasone-formoterol 100-5 mcg/actuation inhaler; Commonly known as:   Dulera 100; Inhale 2 puffs 2 times a day.   montelukast 10 mg tablet; Commonly known as: Singulair; Take 1 tablet   (10 mg) by mouth once daily at bedtime.   Mucus Relief  mg 12 hr tablet; Generic drug: guaiFENesin; Take 1   tablet (600 mg) by mouth 2 times a day as needed for cough. Do not crush,   chew, or split.   pantoprazole 40 mg EC tablet; Commonly known as: ProtoNix; Take 1 tablet   (40 mg) by mouth once daily in the morning. Take before meals. Do not   crush, chew, or split.; Start taking on: September 14, 2024   predniSONE 20 mg tablet; Commonly known as: Deltasone; Take 2 tablets   (40 mg) by mouth once daily for 5 days.; Start taking on: September 14, 2024   sulfamethoxazole-trimethoprim 800-160 mg tablet; Commonly known as:   Bactrim DS; Take 1 tablet by mouth every 12 hours for 4 doses.     CHANGE how you take these medications     albuterol 90 mcg/actuation inhaler; Inhale 2 puffs every 4 hours as   needed for shortness of breath. You should use your inhaler  every 4 hours   while you are awake for the next 3 days, until your breathing is better   after your asthma flare.; What changed: when to take this, reasons to take   this   HumaLOG KwikPen Insulin 100 unit/mL injection; Generic drug: insulin   lispro; Inject 20 Units under the skin 3 times daily (morning, midday,   late afternoon). Take with meals. Take 27 units before meals while you are   on steroids and then go back to taking 20 units before meals once you are   off the steroids.; What changed: how to take this, additional instructions     CONTINUE taking these medications     ergocalciferol 1.25 MG (56562 UT) capsule; Commonly known as: Vitamin   D-2; Notes to patient: Every Monday   gabapentin 300 mg capsule; Commonly known as: Neurontin   hydroCHLOROthiazide 25 mg tablet; Commonly known as: HYDRODiuril   Lantus Solostar U-100 Insulin 100 unit/mL (3 mL) pen; Generic drug:   insulin glargine; Inject 48 Units under the skin once daily at bedtime.   latanoprost 0.005 % ophthalmic solution; Commonly known as: Xalatan   losartan 50 mg tablet; Commonly known as: Cozaar   metFORMIN 500 mg tablet; Commonly known as: Glucophage   metoprolol tartrate 50 mg tablet; Commonly known as: Lopressor   Ozempic 0.25 mg or 0.5 mg (2 mg/3 mL) pen injector; Generic drug:   semaglutide; Notes to patient: Every Sunday   rosuvastatin 40 mg tablet; Commonly known as: Crestor   verapamil 120 mg tablet; Commonly known as: Calan   white petrolatum-mineral oiL 94-3 % ophthalmic ointment; Commonly known   as: Tears Naturale PM   zolpidem 10 mg tablet; Commonly known as: Ambien     STOP taking these medications     chlorhexidine 0.12 % solution; Commonly known as: Peridex   chlorhexidine 4 % external liquid; Commonly known as: Hibiclens       Test Results Pending At Discharge  Pending Labs       No current pending labs.          Hospital Course  Ms. Mayo is a 70 y/o woman w/ PMH of chronic thoracic myelopathy, asthma, and  poorly-controlled T2DM who initially presented for scheduled T10-T11 laminectomy on 9/12 with Neurosurgery. She was unfortunately in the midst of an asthma exacerbation at this time, and so her case was delayed pending resolution of asthma exacerbation.    For her asthma, she was treated with scheduled DuoNebs Q6H and PO Prednisone course with significant improvement by 9/13. She strongly stated that she would prefer to go home at this time to continue her asthma treatments prior to rescheduling her surgery date with NSGY. She was discharged with Dulera controller inhaler, PRN albuterol, nebulizer ordered to be sent to her home, and 5-day steroid course.    Regarding her diabetes, her BG's ranged as high as 500 while inpatient and on steroids. Insulin regimen up-titrated with higher scheduled pre-meal doses and sliding scale ratios while she was on steroids. She was educated by Endocrine and Diabetes teams while inpatient and stated she had a better understanding of her diabetes at the end of this admission.    She was discharged to home on 9/13 to follow up with Neurosurgery, her PCP, and Endocrinology for her ongoing care.    Pertinent Physical Exam At Time of Discharge  Physical Exam  Constitutional:       General: She is not in acute distress.     Appearance: Normal appearance. She is not toxic-appearing.   HENT:      Head: Normocephalic and atraumatic.      Nose: Nose normal. No congestion or rhinorrhea.      Mouth/Throat:      Mouth: Mucous membranes are moist.      Pharynx: Oropharynx is clear.   Eyes:      Extraocular Movements: Extraocular movements intact.      Conjunctiva/sclera: Conjunctivae normal.      Pupils: Pupils are equal, round, and reactive to light.   Cardiovascular:      Rate and Rhythm: Normal rate and regular rhythm.      Pulses: Normal pulses.      Heart sounds: No murmur heard.     No friction rub. No gallop.   Pulmonary:      Effort: Pulmonary effort is normal. No respiratory distress.       Breath sounds: No stridor. Wheezing present.      Comments: +Expiratory wheezes throughout b/l posterior lung fields; Good air mvmt throughout; Conversational in full sentences without dsypnea  Abdominal:      General: Abdomen is flat.      Palpations: Abdomen is soft.      Tenderness: There is no abdominal tenderness. There is no guarding or rebound.   Musculoskeletal:      Cervical back: Normal range of motion and neck supple.      Right lower leg: No edema.      Left lower leg: No edema.   Skin:     General: Skin is warm and dry.      Capillary Refill: Capillary refill takes less than 2 seconds.   Neurological:      Mental Status: She is alert and oriented to person, place, and time. Mental status is at baseline.      Sensory: No sensory deficit.   Psychiatric:         Mood and Affect: Mood normal.         Behavior: Behavior normal.       Outpatient Follow-Up  No future appointments. Encouraged to follow up with Neurosurgery, her PCP, Endocrinology    Amara Tapia MD

## 2024-09-13 NOTE — CONSULTS
Inpatient Diabetes Education Consult    Reason for Visit:  Abbie Mayo is a 69 y.o. female who presents for thoracic myelopathy, asthma exacerbation, T2DM    Consulting Service/Provider: Dr. Tapia team    Visit Type: Initial visit    Visit Modality: In-person    Discharge Equipment/Supply Needs:       Patient has supplies at home: Blood glucose meter:  , Testing strips:  , Lancets, and Pen needles    Patient History and Assessment:  New diagnosis: Steroid-induced hyperglycemia  Previous diagnosis: Type 2  Patient known to Diabetes Education department: No  Treatment prior to hospital admission: Oral medications Metformin  Insulin: Rapid acting, Long acting, via pen  Blood glucose testing: Before Meals  To start Ozempic once weekly  Complications: cardiovascular disease and obesity  PTA Medications:    Current Outpatient Medications   Medication Instructions    albuterol 90 mcg/actuation inhaler 2 puffs, inhalation, Every 6 hours PRN    chlorhexidine (Hibiclens) 4 % external liquid 1 Application, Topical, Daily, Use per CPM/PAT provided instructions    ergocalciferol (Vitamin D-2) 1.25 MG (46023 UT) capsule 1 capsule, oral, Once Weekly, Every Monday    gabapentin (Neurontin) 300 mg capsule 1 capsule, oral, 2 times daily    HumaLOG KwikPen Insulin 20 Units, 3 times daily (morning, midday, late afternoon), Take with meals    hydroCHLOROthiazide (HYDRODiuril) 25 mg tablet 1 tablet, oral, Daily    Lantus Solostar U-100 Insulin 48 Units, subcutaneous, Nightly    latanoprost (Xalatan) 0.005 % ophthalmic solution 1 drop, Right Eye, 2 times daily    losartan (Cozaar) 50 mg tablet 1 tablet, oral, Daily    metFORMIN (Glucophage) 500 mg tablet 0.5 tablets, oral, 2 times daily (morning and late afternoon)    metoprolol tartrate (Lopressor) 50 mg tablet 1.5 tablets, oral, 2 times daily    Ozempic 0.25 mg, subcutaneous, Weekly, Every Sunday    rosuvastatin (Crestor) 40 mg tablet 1 tablet, oral, Nightly    verapamil (Calan) 120  "mg tablet 1 tablet, oral, 3 times daily    white petrolatum-mineral oiL (Tears Naturale PM) 94-3 % ophthalmic ointment 1 Application, Left Eye, As needed    zolpidem (AMBIEN) 10 mg, oral, Nightly       Glucose   Date/Time Value Ref Range Status   09/12/2024 10:34  (H) 74 - 99 mg/dL Final   09/09/2024 02:07  (H) 74 - 99 mg/dL Final   09/09/2024 11:07  (H) 74 - 99 mg/dL Final     No results found for: \"CPEPTIDE\"  Hemoglobin A1C   Date Value Ref Range Status   09/09/2024 10.6 (H) see below % Final   12/15/2023 9.9 (H) 4.0 - 5.6 % Final   04/26/2023 10.0 (H) 4.0 - 5.6 % Final       Patient Learning/Readiness Assessment:  Ms. Mayo is agreeable to diabetes ed visit.    Interventions/Topics Covered:  See After Visit Summary for handouts/information sheets provided to patient.  Education Documentation  Self-Care Behaviors, taught by She Salazar RN at 9/13/2024 11:22 AM.  Learner: Patient  Readiness: Acceptance  Method: Explanation, Teach-back  Response: Verbalizes Understanding  Comment: Ms. Mayo is independent with self care behaviors re insulin, monitoring and provider follow up.  States she andrea well.  She is \"working on better nutrition\" for herself.  Comfortable with changes to insulin regimen for home          Additional topics covered: We reviewed her insulin regimen compared to inpatient - she is comfortable with changes and will be able to follow the recs.  She is able to follow SS if ordered at discharge.   She monitor BG prior to meals.  Follows with PCP close to home ([place], [place]).    Re nutrition/healthy eating, states she tries to eat better.  Avoids sweets but snacks through the day.    Additional materials provided: n/a    CGM:  n/a    Education Outcome/Recommendations:        Recommendations for bedside nursing: Allow patient to self-inject insulin (supervised)    Recommendations for Providers: Follow-up w/ PCP and/or Endocrinology    Additional Comments: Ms. Mayo has had DM x 25 " years.  She is comfortable with her med regimen.  States she was to have surgery for spine then developed asthma exacerbation and needed to be admitted.  She is very aware of the action of steroids on the BG.  Will follow as needed while inpatient.

## 2024-09-13 NOTE — SIGNIFICANT EVENT
Neurosurgery Sign-off and Final Recommendations    All imaging and clinical information reviewed as neurosurgical team. Final recommendations as follows:    If patient remains inpatient and is medically optimized for surgery (which is medically necessary to prevent further neurological decline) please page [phone] and the Neurosurgery team will re-evaluate. We strongly recommend performing surgery during this admission if possible  If patient is being discharged and considered not an appropriate candidate for surgery please also page [phone] and the Neurosurgery team will schedule and discuss outpatient surgery until she is medically optimized.     If any further imaging is obtained on an inpatient basis, please page [phone] and we will review. Please do not hesitate to reach out with further questions or concerns about this patient's neurosurgical care.    Kuldeep Andrade MD  PGY-1, Department of Neurosurgery  [facility]  8:43 AM

## 2024-09-13 NOTE — CARE PLAN
Abbie Mayo is a 68 yo F with a PMHx of asthma, HTN, HLD, previous MI, PVC's, and severe spinal stenosis who was admitted to the ED from pre-op testing with shortness of breath. She is tentatively scheduled to undergo T10-T11 laminectomy facetectomy with L2-L3 laminectomy on 9/13. Pt presenting with acute asthma exacerbation being treated with Duo-Nebs q6h and SoluMedrol 60 mg q6h. Pulmonology consulted for medical optimization prior to surgery.      Patient's symptoms and presentation c/w acute asthma exacerbation, but pt's dyspnea and chronic cough may be due to other factors such as allergic rhinits, GERD, obesity hypoventilation syndrome, COPD as well. Unfortunately, patient was only able to complete bedside spirometry due to functional status while inpatient     #C/f reactive airway disease  #C/f asthma exacerbation  #Chronic cough  #Severe thoracic spinal stenosis     Updates 9/13:  -Unfortunately, she remains with significant wheezing today and has not had much improvement with steroid treatment   -Would recommend continuing prednisone course for four more days. Recommend initiating ICS/LABA inhaler with nebulized albuterol treatments for discharge  -Okay for discharge from Pulm standpoint with need for close outpatient follow up     Blake Warren MD   Internal Medicine PGY-1     Case discussed with Dr. Roca    I reviewed the resident/fellow's documentation and discussed the patient with the resident/fellow. I agree with the resident/fellow's medical decision making as documented in the note.     Principal Problem:    Thoracic myelopathy  Active Problems:    Type 2 diabetes mellitus (Multi)    Asthma with acute exacerbation ([facility])    Rizwan Roca MD

## 2024-09-14 NOTE — NURSING NOTE
Pt discharged; condition stable. All instructions given and discussed; no questions asked by patient. Patient taken down via wheelchair to private vehicle with sons taking pt home.

## 2024-09-16 DIAGNOSIS — Z79.4 TYPE 2 DIABETES MELLITUS WITHOUT COMPLICATION, WITH LONG-TERM CURRENT USE OF INSULIN (MULTI): Primary | ICD-10-CM

## 2024-09-16 DIAGNOSIS — M51.04 THORACIC DISC DISEASE WITH MYELOPATHY: Primary | ICD-10-CM

## 2024-09-16 DIAGNOSIS — E11.9 TYPE 2 DIABETES MELLITUS WITHOUT COMPLICATION, WITH LONG-TERM CURRENT USE OF INSULIN (MULTI): Primary | ICD-10-CM

## 2024-09-16 PROBLEM — J45.901 ASTHMA WITH ACUTE EXACERBATION (HHS-HCC): Status: ACTIVE | Noted: 2024-09-16

## 2024-09-17 ENCOUNTER — HOSPITAL ENCOUNTER (OUTPATIENT)
Facility: HOSPITAL | Age: 69
Setting detail: SURGERY ADMIT
End: 2024-09-17
Attending: NEUROLOGICAL SURGERY | Admitting: NEUROLOGICAL SURGERY
Payer: MEDICARE

## 2024-09-24 NOTE — PROGRESS NOTES
Pharmacy Post-Discharge Visit    Abbie Mayo is a 69 y.o. female was referred to Clinical Pharmacy Team to complete a post-discharge medication optimization and monitoring visit.  The patient was referred for their Diabetes.    Admission Date: 24  Discharge Date: 24    Referring Provider: Renu Luu MD  PCP: No Assigned PCP Generic Provider, MD - Patient reports that she sees a PCP outside of .      Subjective   Allergies   Allergen Reactions    Suvorexant Other     Not sure       [id number] [place], [place] - [address]  [facility]  [address]  [facility] [address]  Phone: [phone] Fax: [phone]    [facility]  [address], [address]  [facility] [address]  Phone: [phone] Fax: [phone]      Medication System Management:  Affordability/Accessibility: Medicare  Adherence/Organization: Patient states she is on too many medications and sometimes forgets to take them.       Social History     Social History Narrative    Not on file        HPI  Type II Diabetes  Current  Pharmacotherapy:   Metformin  mg BID  Lantus 48 units daily  Humalog 20 units TID  Ozempic 0.5 mg weekly     SECONDARY PREVENTION  - Statin? Rosuvastatin 40 mg (patient states she is not sure if she is taking)   LDL: N/A   TC: N/A  - ACE-I/ARB? Losartan 50 mg   UACR: N/A   BP: 163/78  - Aspirin? none    -The ASCVD Risk score (Funmi DK, et al., 2019) failed to calculate for the following reasons:    The patient has a prior MI or stroke diagnosis      Current monitoring regimen:   Patient is using: Finger sticks     SMBG Fasting Readings:    Mornins  Afternoon: 180s  Dinner: 180s    Patient states that she checks her blood sugar levels when she eats but she only eats one time a day on most days.     Diet:  cooks everyday    Exercise: Patient states that she is wheelchair bound and cannot walk as much        Objective     There  "were no vitals taken for this visit.   BP Readings from Last 4 Encounters:   09/13/24 163/78   09/09/24 139/74   08/20/24 143/82      There were no vitals filed for this visit.     LAB  Lab Results   Component Value Date    BILITOT 0.6 09/09/2024    CALCIUM 9.3 09/12/2024    CO2 26 09/12/2024     09/12/2024    CREATININE 0.95 09/12/2024    GLUCOSE 120 (H) 09/12/2024    ALKPHOS 126 09/09/2024    K 3.7 09/12/2024    PROT 6.9 09/09/2024     09/12/2024    AST 15 09/09/2024    ALT 18 09/09/2024    BUN 28 (H) 09/12/2024    ANIONGAP 13 09/12/2024    PHOS 2.8 09/12/2024    ALBUMIN 4.0 09/12/2024     No results found for: \"TRIG\", \"CHOL\", \"LDLCALC\", \"HDL\"  Lab Results   Component Value Date    HGBA1C 10.6 (H) 09/09/2024         Current Outpatient Medications   Medication Instructions    albuterol 90 mcg/actuation inhaler Inhale 2 puffs every 4 hours as needed for shortness of breath. You should use your inhaler every 4 hours while you are awake for the next 3 days, until your breathing is better after your asthma flare.    Blood glucose monitoring meter kit kit 1 each, miscellaneous, 4 times daily    ergocalciferol (Vitamin D-2) 1.25 MG (14267 UT) capsule 1 capsule, oral, Once Weekly, Every Monday    gabapentin (Neurontin) 300 mg capsule 1 capsule, oral, 2 times daily    HumaLOG KwikPen Insulin 20 Units, subcutaneous, 3 times daily (morning, midday, late afternoon), Take with meals. Take 27 units before meals while you are on steroids and then go back to taking 20 units before meals once you are off the steroids.    hydroCHLOROthiazide (HYDRODiuril) 25 mg tablet 1 tablet, oral, Daily    ipratropium-albuteroL (Duo-Neb) 0.5-2.5 mg/3 mL nebulizer solution 3 mL, nebulization, Every 6 hours PRN    Lantus Solostar U-100 Insulin 48 Units, subcutaneous, Nightly    latanoprost (Xalatan) 0.005 % ophthalmic solution 1 drop, Right Eye, 2 times daily    losartan (Cozaar) 50 mg tablet 1 tablet, oral, Daily    metFORMIN " (Glucophage) 500 mg tablet 0.5 tablets, oral, 2 times daily (morning and late afternoon)    metoprolol tartrate (Lopressor) 50 mg tablet 1.5 tablets, oral, 2 times daily    mometasone-formoterol (Dulera 100) 100-5 mcg/actuation inhaler 2 puffs, inhalation, 2 times daily RT    montelukast (SINGULAIR) 10 mg, oral, Nightly    Mucus Relief  mg, oral, 2 times daily PRN, Do not crush, chew, or split.    Ozempic 0.25 mg, subcutaneous, Weekly, Every Sunday    pantoprazole (PROTONIX) 40 mg, oral, Daily before breakfast, Do not crush, chew, or split.    rosuvastatin (Crestor) 40 mg tablet 1 tablet, oral, Nightly    verapamil (Calan) 120 mg tablet 1 tablet, oral, 3 times daily    white petrolatum-mineral oiL (Tears Naturale PM) 94-3 % ophthalmic ointment 1 Application, Left Eye, As needed    zolpidem (AMBIEN) 10 mg, oral, Nightly        DRUG INTERACTIONS  None at time of review      Assessment/Plan   Problem List Items Addressed This Visit       Type 2 diabetes mellitus (Multi) - Primary     Patient has uncontrolled diabetes. Patient expressed concerns that she takes a lot of medications and its too much for her to keep up with. Patient reports that she cuts her metformin tablet in half due to GI side effects. Also, patient states that since Ozempic is once weekly she forgets to take it. Patient is not adherent to Humalog TID due to only eating one meal per day. Recommend light arm exercises. Educated on lifestyle changes and adherence.  Recommended to patient to take metformin with meals to decrease GI side effects    Plan    CONTINUE  Metformin  mg BID (patient reports cutting tablet in half)  Lantus 48 units daily  Humalog 20 units TID  Ozempic 0.5 mg weekly (recommended she reach out to surgeon to see if she should stop before surgery on 10/17/24)     START Freestyle Phu          Relevant Orders    Follow Up In Clinical Pharmacy       Follow Up: 10/9 @1pm    Continue all meds under the continuation of care  with the referring provider and clinical pharmacy team.    Mart Rodriguez PharmD  PGY-1 Pharmacy Resident     Verbal consent to manage patient's drug therapy was obtained from the patient. They were informed they may decline to participate or withdraw from participation in pharmacy services at any time.

## 2024-09-25 ENCOUNTER — APPOINTMENT (OUTPATIENT)
Dept: NEUROSURGERY | Facility: HOSPITAL | Age: 69
End: 2024-09-25
Payer: MEDICARE

## 2024-09-25 ENCOUNTER — APPOINTMENT (OUTPATIENT)
Dept: PHARMACY | Facility: HOSPITAL | Age: 69
End: 2024-09-25
Payer: MEDICARE

## 2024-09-25 DIAGNOSIS — E11.9 TYPE 2 DIABETES MELLITUS WITHOUT COMPLICATION, WITH LONG-TERM CURRENT USE OF INSULIN (MULTI): Primary | ICD-10-CM

## 2024-09-25 DIAGNOSIS — Z79.4 TYPE 2 DIABETES MELLITUS WITHOUT COMPLICATION, WITH LONG-TERM CURRENT USE OF INSULIN (MULTI): Primary | ICD-10-CM

## 2024-09-25 RX ORDER — FLASH GLUCOSE SENSOR
KIT MISCELLANEOUS
Qty: 2 EACH | Refills: 3 | Status: SHIPPED | OUTPATIENT
Start: 2024-09-25 | End: 2024-09-25 | Stop reason: WASHOUT

## 2024-09-25 RX ORDER — FLASH GLUCOSE SENSOR
KIT MISCELLANEOUS
Qty: 2 EACH | Refills: 3 | Status: SHIPPED | OUTPATIENT
Start: 2024-09-25

## 2024-09-25 NOTE — PROGRESS NOTES
I reviewed the progress note and agree with the resident’s findings and plans as written. Case discussed with resident.    Amelia Dover, PharmD

## 2024-09-25 NOTE — ASSESSMENT & PLAN NOTE
Patient has uncontrolled diabetes. Patient expressed concerns that she takes a lot of medications and its too much for her to keep up with. Patient reports that she cuts her metformin tablet in half due to GI side effects. Also, patient states that since Ozempic is once weekly she forgets to take it. Patient is not adherent to Humalog TID due to only eating one meal per day. Recommend light arm exercises. Educated on lifestyle changes and adherence.  Recommended to patient to take metformin with meals to decrease GI side effects    Plan    CONTINUE  Metformin  mg BID (patient reports cutting tablet in half)  Lantus 48 units daily  Humalog 20 units TID  Ozempic 0.5 mg weekly (recommended she reach out to surgeon to see if she should stop before surgery on 10/17/24)     START Freestyle Phu

## 2024-09-25 NOTE — ASSESSMENT & PLAN NOTE
Patient expressed concerns that she takes a lot of medications and its too much for her to keep up with. Patient reports that she cuts her metformin tablet in half due to GI side effects. Also, patient states that since Ozempic is once weekly she forgets to take it. Patient is not adherent to Humalog TID due to only eating one meal per day. Recommend light arm exercises. Educated on lifestyle changes and adherence.  Recommended to patient to take metformin with meals to decrease GI side effects    Plan    CONTINUE  Metformin  mg BID (patient reports cutting tablet in half)  Lantus 48 units daily  Humalog 20 units TID  Ozempic 0.5 mg weekly (recommended she reach out to surgeon to see if she should stop before surgery on 10/17/24)     START Freestyle Phu

## 2024-10-01 ENCOUNTER — CLINICAL SUPPORT (OUTPATIENT)
Dept: PREADMISSION TESTING | Facility: HOSPITAL | Age: 69
End: 2024-10-01
Payer: MEDICARE

## 2024-10-01 NOTE — CPM/PAT NURSE NOTE
CPM/PAT Nurse Note      Name: Abbie Mayo (Abbie Mayo)  /Age: 1955/69 y.o.     Abbie Mayo is scheduled for OPEN SURGERY - T10-T11 laminectomy facetectomy with T10 -11 posterior nonsegmental instrumented fusion using local bone autograft and DBM along with L2-3 laminectomy with facetectomy - Bilateral on 10/17/24      **Data Input  Past Medical History:   Diagnosis Date    Anxiety     Asthma ([facility])     Asthma with acute exacerbation ([facility]) 2024    Complaining of cough     24-- c/o cough with phelgm    Coronary artery disease     History of chronic ischemic heart disease     Hyperlipidemia     Hypertension     Lumbar radiculopathy     Myocardial infarction ([facility])     Several years ago    Pseudoclaudication syndrome     Shortness of breath     Spinal stenosis     spinal stenosis in the thoracic spine    Type 2 diabetes mellitus (Multi)     Poorly controlled       Past Surgical History:   Procedure Laterality Date    CARDIAC CATHETERIZATION      CATARACT EXTRACTION      CHOLECYSTECTOMY      COLONOSCOPY      IRIDOTOMY / IRIDECTOMY      TRABECULECTOMY       Patient Sexual activity questions deferred to the physician.    No family history on file.    Allergies   Allergen Reactions    Suvorexant Other     Not sure       Prior to Admission medications    Medication Sig Start Date End Date Taking? Authorizing Provider   albuterol 90 mcg/actuation inhaler Inhale 2 puffs every 4 hours as needed for shortness of breath. You should use your inhaler every 4 hours while you are awake for the next 3 days, until your breathing is better after your asthma flare. 24   Amara Taipa MD   Blood glucose monitoring meter kit kit 1 each 4 times a day. 24   Amara Tapia MD   ergocalciferol (Vitamin D-2) 1.25 MG (40683 UT) capsule Take 1 capsule (1,250 mcg) by mouth 1 (one) time per week. Every 24   Historical Provider, MD   FreeStyle Phu 2 Sensor kit Use as instructed. Replace  every 14 days. 9/25/24   Renu Luu MD   gabapentin (Neurontin) 300 mg capsule Take 1 capsule (300 mg) by mouth 2 times a day. 4/12/24   Historical Provider, MD   guaiFENesin (Mucinex) 600 mg 12 hr tablet Take 1 tablet (600 mg) by mouth 2 times a day as needed for cough. Do not crush, chew, or split.  Patient not taking: Reported on 9/25/2024 9/13/24   Amara Tapia MD   HumaLOG KwikPen Insulin 100 unit/mL injection Inject 20 Units under the skin 3 times daily (morning, midday, late afternoon). Take with meals. Take 27 units before meals while you are on steroids and then go back to taking 20 units before meals once you are off the steroids. 9/13/24   Amara Tapia MD   hydroCHLOROthiazide (HYDRODiuril) 25 mg tablet Take 1 tablet (25 mg) by mouth once daily.    Historical Provider, MD   ipratropium-albuteroL (Duo-Neb) 0.5-2.5 mg/3 mL nebulizer solution Take 3 mL by nebulization every 6 hours if needed for wheezing or shortness of breath. 9/13/24   Amara Tapia MD   Lantus Solostar U-100 Insulin 100 unit/mL (3 mL) pen Inject 48 Units under the skin once daily at bedtime. 9/13/24   Amara Tapia MD   latanoprost (Xalatan) 0.005 % ophthalmic solution Administer 1 drop into the right eye 2 times a day.    Historical Provider, MD   losartan (Cozaar) 50 mg tablet Take 1 tablet (50 mg) by mouth once daily. 4/12/24   Historical Provider, MD   metFORMIN (Glucophage) 500 mg tablet Take 0.5 tablets (250 mg) by mouth 2 times daily (morning and late afternoon).    Historical Provider, MD   metoprolol tartrate (Lopressor) 50 mg tablet Take 1.5 tablets by mouth 2 times a day. 4/12/24   Historical Provider, MD   mometasone-formoterol (Dulera 100) 100-5 mcg/actuation inhaler Inhale 2 puffs 2 times a day. 9/13/24   Amara Tapia MD   montelukast (Singulair) 10 mg tablet Take 1 tablet (10 mg) by mouth once daily at bedtime. 9/13/24   Amara Tapia MD   Ozempic 0.25 mg or 0.5 mg (2 mg/3 mL) pen injector Inject  0.25 mg under the skin 1 (one) time per week. Every Sunday 12/11/23   Historical Provider, MD   pantoprazole (ProtoNix) 40 mg EC tablet Take 1 tablet (40 mg) by mouth once daily in the morning. Take before meals. Do not crush, chew, or split.  Patient not taking: Reported on 9/25/2024 9/14/24   Amara Tapia MD   rosuvastatin (Crestor) 40 mg tablet Take 1 tablet (40 mg) by mouth once daily at bedtime. 4/12/24   Historical Provider, MD   verapamil (Calan) 120 mg tablet Take 1 tablet (120 mg) by mouth 3 times a day. 4/12/24   Historical Provider, MD   white petrolatum-mineral oiL (Tears Naturale PM) 94-3 % ophthalmic ointment Apply 1 Application to left eye if needed for dry eyes.    Historical Provider, MD   zolpidem (Ambien) 10 mg tablet Take 1 tablet (10 mg) by mouth once daily at bedtime. 8/25/24   Historical Provider, MD   FreeStyle Phu 2 Sensor kit Use as instructed. Replace every 14 days. 9/25/24 9/25/24  Renu Luu MD        PAT ROS     DASI Risk Score      Flowsheet Row Pre-Admission Testing from 9/9/2024 in [facility]   Can you take care of yourself (eat, dress, bathe, or use toilet)?  0 filed at 09/09/2024 1037   Can you walk indoors, such as around your house? 0 filed at 09/09/2024 1037   Can you walk a block or two on level ground?  0 filed at 09/09/2024 1037   Can you climb a flight of stairs or walk up a hill? 0 filed at 09/09/2024 1037   Can you run a short distance? 0 filed at 09/09/2024 1037   Can you do light work around the house like dusting or washing dishes? 0 filed at 09/09/2024 1037   Can you do moderate work around the house like vacuuming, sweeping floors or carrying groceries? 0 filed at 09/09/2024 1037   Can you do heavy work around the house like scrubbing floors or lifting and moving heavy furniture?  0 filed at 09/09/2024 1037   Can you do yard work like raking leaves, weeding or pushing a mower? 0 filed at 09/09/2024 1037   Can you have sexual relations? 0  filed at 09/09/2024 1037   Can you participate in moderate recreational activities like golf, bowling, dancing, doubles tennis or throwing a baseball or football? 0 filed at 09/09/2024 1037   Can you participate in strenous sports like swimming, singles tennis, football, basketball, or skiing? 0 filed at 09/09/2024 1037   DASI SCORE 0 filed at 09/09/2024 1037   METS Score (Will be calculated only when all the questions are answered) 2.7 filed at 09/09/2024 1037          Caprini DVT Assessment      Flowsheet Row ED to Hosp-Admission (Discharged) from 9/9/2024 in [facility] with Amara Tapia MD and Delia Sharpe MD  Most recent reading at 9/9/2024  6:34 PM Pre-Admission Testing from 9/9/2024 in [facility]  Most recent reading at 9/9/2024 11:38 AM   DVT Score 6 filed at 09/09/2024 1834 10 filed at 09/09/2024 1138   BMI -- 31-40 (Obesity) filed at 09/09/2024 1138   RETIRED: Current Status Major surgery planned, lasting over 3 hours filed at 09/09/2024 1834 Major surgery planned, lasting over 3 hours filed at 09/09/2024 1138   RETIRED: History Acute myocardial infarction filed at 09/09/2024 1834 --   RETIRED: Age -- 60-75 years filed at 09/09/2024 1138          Modified Frailty Index    No data to display       CHADS2 Stroke Risk  Current as of today        N/A 3 to 100%: High Risk   2 to < 3%: Medium Risk   0 to < 2%: Low Risk     Last Change: N/A          This score determines the patient's risk of having a stroke if the patient has atrial fibrillation.        This score is not applicable to this patient. Components are not calculated.          Revised Cardiac Risk Index      Flowsheet Row Pre-Admission Testing from 9/9/2024 in [facility]   High-Risk Surgery (Intraperitoneal, Intrathoracic,Suprainguinal vascular) 0 filed at 09/09/2024 1139   History of ischemic heart disease (History of MI, History of positive exercuse test, Current chest paint  considered due to myocardial ischemia, Use of nitrate therapy, ECG with pathological Q Waves) 1 filed at 09/09/2024 1139   History of congestive heart failure (pulmonary edemia, bilateral rales or S3 gallop, Paroxysmal nocturnal dyspnea, CXR showing pulmonary vascular redistribution) 0 filed at 09/09/2024 1139   History of cerebrovascular disease (Prior TIA or stroke) 0 filed at 09/09/2024 1139   Pre-operative insulin treatment 1 filed at 09/09/2024 1139   Pre-operative creatinine>2 mg/dl 0 filed at 09/09/2024 1139   Revised Cardiac Risk Calculator 2 filed at 09/09/2024 1139          Apfel Simplified Score      Flowsheet Row Pre-Admission Testing from 9/9/2024 in [facility]   Smoking status 1 filed at 09/09/2024 1139   History of motion sickness or PONV  0 filed at 09/09/2024 1139   Use of postoperative opioids 1 filed at 09/09/2024 1139   Gender - Female 1=Yes filed at 09/09/2024 1139   Apfel Simplified Score Calculator 3 filed at 09/09/2024 1139          Risk Analysis Index Results This Encounter    No data found in the last 10 encounters.       Stop Bang Score      Flowsheet Row Pre-Admission Testing from 9/9/2024 in [facility]   Do you snore loudly? 0 filed at 09/09/2024 1037   Do you often feel tired or fatigued after your sleep? 0 filed at 09/09/2024 1037   Has anyone ever observed you stop breathing in your sleep? 0 filed at 09/09/2024 1037   Do you have or are you being treated for high blood pressure? 0 filed at 09/09/2024 1037   Recent BMI (Calculated) 35.5 filed at 09/09/2024 1037   Is BMI greater than 35 kg/m2? 1=Yes filed at 09/09/2024 1037   Age older than 50 years old? 1=Yes filed at 09/09/2024 1037   Is your neck circumference greater than 17 inches (Male) or 16 inches (Female)? 0 filed at 09/09/2024 1037   Gender - Male 0=No filed at 09/09/2024 1037   STOP-BANG Total Score 2 filed at 09/09/2024 1037        Providers:                                                                                                              Cardiology: Rufino Gresham History of MI ,PVCs (premature ventricular contractions), ischemic cardiac disease   Seen by Dr. Newton 24, Stress test and Echo on 24      PCP: Coretta Puentes at [facility], LOV 24: seen by Pharmacist Odalis Rodriguez for management of Type 2 diabetes mellitus         NeuroSx: Ajith Lopez 24, presents with complaints LOW BACK PAIN radiation to LEFT LEG  AND right leg that started about   2 years  ago. Severe spinal stenosis in the thoracic spine resulting in signs or symptoms of thoracic myelopathy along with severe lumbar stenosis causing neurogenic claudication           -24: Admitted for Thoracic myelopathy  Asthma exacerbation and poorly-controlled T2DM          --TESTING:        - EK24  Normal sinus rhythm  Cannot rule out Inferior infarct , age undetermined  Cannot rule out Anterior infarct , age undetermined  Abnormal ECG      - Echo/Stress test: 24, see media  Impression:   No stress induced ischemia. There is septal hypokinesis. There is good wall motion throughout the remainder of left ventricle. Left ventricular EF of 58%      - Echo: 24  CONCLUSIONS:   1. Poorly visualized anatomical structures due to suboptimal image quality.   2. Left ventricular ejection fraction is normal, calculated by Obrien's biplane at 62%.   3. Left ventricular diastolic filling was indeterminate.   4. Unable to determine right ventricular systolic function.   5. No prior echocardiogram available for comparison.      - CT Chest for PE: 24  IMPRESSION:  1. No evidence of acute pulmonary embolism. Assessment is however limited due to suboptimal opacification of the pulmonary artery.  2. Mildly dilated main pulmonary artery which can be seen in  pulmonary artery hypertension.      - MR Thoracic spine: 24  IMPRESSION:  Moderate to severe central spinal stenosis, bilateral  neural foraminanarrowing involving lower levels of the thoracic spine causing moderate to severe deformity of the thecal sac and spinal cord compression. 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 Follow-up with high-resolution MRI is recommended.      Mild-to-moderate central spinal stenosis, bilateral neural foramina  narrowing involving the remaining levels of the thoracic spine.            _________________________________________________________________  **Data input only. No medications, history or providers verified           Rosa Keenan LPN  Preadmission Testing          Nurse Plan of Action:   Cardiology input provided see media 9/10/24

## 2024-10-03 PROBLEM — M48.061 SPINAL STENOSIS OF LUMBAR REGION: Status: ACTIVE | Noted: 2024-10-03

## 2024-10-03 PROBLEM — S93.609A FOOT SPRAIN: Status: ACTIVE | Noted: 2024-10-03

## 2024-10-03 PROBLEM — R53.81 PHYSICAL DECONDITIONING: Status: ACTIVE | Noted: 2024-10-03

## 2024-10-03 PROBLEM — R07.89 ATYPICAL CHEST PAIN: Status: ACTIVE | Noted: 2024-10-03

## 2024-10-03 PROBLEM — S96.912A LEFT ANKLE STRAIN: Status: ACTIVE | Noted: 2024-10-03

## 2024-10-03 PROBLEM — S39.012A LUMBAR STRAIN: Status: ACTIVE | Noted: 2024-10-03

## 2024-10-03 PROBLEM — E66.9 NON MORBID OBESITY: Status: ACTIVE | Noted: 2018-12-17

## 2024-10-03 PROBLEM — R10.9 NONSPECIFIC ABDOMINAL PAIN: Status: ACTIVE | Noted: 2024-10-03

## 2024-10-03 PROBLEM — K29.00 ACUTE GASTRITIS WITHOUT HEMORRHAGE: Status: ACTIVE | Noted: 2024-10-03

## 2024-10-03 PROBLEM — Z96.1 BILATERAL PSEUDOPHAKIA: Status: ACTIVE | Noted: 2022-12-19

## 2024-10-03 PROBLEM — I25.2 HISTORY OF MI (MYOCARDIAL INFARCTION): Status: ACTIVE | Noted: 2018-12-17

## 2024-10-03 PROBLEM — I25.2 OLD MYOCARDIAL INFARCTION: Status: ACTIVE | Noted: 2024-10-03

## 2024-10-03 PROBLEM — E78.2 MIXED HYPERLIPIDEMIA: Status: ACTIVE | Noted: 2024-10-03

## 2024-10-03 PROBLEM — I10 ESSENTIAL HYPERTENSION: Status: ACTIVE | Noted: 2018-12-17

## 2024-10-03 PROBLEM — R00.2 HEART PALPITATIONS: Status: ACTIVE | Noted: 2018-12-17

## 2024-10-03 PROBLEM — E87.6 HYPOKALEMIA: Status: ACTIVE | Noted: 2024-10-03

## 2024-10-03 PROBLEM — E11.3299 MILD NONPROLIFERATIVE DIABETIC RETINOPATHY ASSOCIATED WITH TYPE 2 DIABETES MELLITUS (MULTI): Status: ACTIVE | Noted: 2021-12-15

## 2024-10-03 PROBLEM — H40.1130 PRIMARY OPEN ANGLE GLAUCOMA (POAG) OF BOTH EYES: Status: ACTIVE | Noted: 2022-12-19

## 2024-10-03 RX ORDER — BRIMONIDINE TARTRATE AND TIMOLOL MALEATE 2; 5 MG/ML; MG/ML
SOLUTION OPHTHALMIC
COMMUNITY

## 2024-10-03 RX ORDER — ACETAMINOPHEN AND CODEINE PHOSPHATE 300; 30 MG/1; MG/1
1 TABLET ORAL EVERY 6 HOURS PRN
COMMUNITY
Start: 2024-05-21

## 2024-10-03 RX ORDER — GLIMEPIRIDE 4 MG/1
TABLET ORAL
COMMUNITY

## 2024-10-03 RX ORDER — BLOOD-GLUCOSE METER
EACH MISCELLANEOUS
COMMUNITY
Start: 2024-08-22

## 2024-10-03 RX ORDER — LANOLIN ALCOHOL/MO/W.PET/CERES
CREAM (GRAM) TOPICAL
COMMUNITY

## 2024-10-03 RX ORDER — DICLOFENAC SODIUM 50 MG/1
1 TABLET, DELAYED RELEASE ORAL 2 TIMES DAILY PRN
COMMUNITY
Start: 2024-04-16

## 2024-10-03 RX ORDER — ONDANSETRON 4 MG/1
TABLET, ORALLY DISINTEGRATING ORAL
COMMUNITY
Start: 2023-10-05

## 2024-10-03 RX ORDER — PREGABALIN 50 MG/1
1 CAPSULE ORAL 3 TIMES DAILY
COMMUNITY

## 2024-10-03 RX ORDER — TRAVOPROST OPHTHALMIC SOLUTION 0.04 MG/ML
1 SOLUTION OPHTHALMIC
COMMUNITY

## 2024-10-03 RX ORDER — AZITHROMYCIN 250 MG/1
TABLET, FILM COATED ORAL
COMMUNITY
Start: 2024-08-09

## 2024-10-03 RX ORDER — DEXTROSE 50 % IN WATER (D50W) INTRAVENOUS SYRINGE
COMMUNITY
Start: 2024-04-09

## 2024-10-03 RX ORDER — HYDROCODONE BITARTRATE AND ACETAMINOPHEN 5; 325 MG/1; MG/1
TABLET ORAL
COMMUNITY
Start: 2024-04-16

## 2024-10-03 RX ORDER — ALPRAZOLAM 1 MG/1
1 TABLET ORAL
COMMUNITY

## 2024-10-03 RX ORDER — FLUTICASONE FUROATE AND VILANTEROL 100; 25 UG/1; UG/1
POWDER RESPIRATORY (INHALATION)
COMMUNITY
Start: 2024-04-16

## 2024-10-03 RX ORDER — FERROUS SULFATE 325(65) MG
325 TABLET ORAL
COMMUNITY

## 2024-10-08 DIAGNOSIS — J45.21 INTERMITTENT ASTHMA WITH ACUTE EXACERBATION, UNSPECIFIED ASTHMA SEVERITY (HHS-HCC): Primary | ICD-10-CM

## 2024-10-08 NOTE — PROGRESS NOTES
Pharmacy Post-Discharge Visit    Abbie Mayo is a 69 y.o. female was referred to Clinical Pharmacy Team to complete a post-discharge medication optimization and monitoring visit.  The patient was referred for their Diabetes.    Admission Date: 9/9/24  Discharge Date: 9/13/24    Referring Provider: Renu Luu MD  PCP: No Assigned PCP Generic Provider, MD - Patient reports that she sees a PCP outside of .       Subjective   Allergies   Allergen Reactions    Suvorexant Other     Not sure       [id number] [place] [facility] [address]  AT [facility] [address] [facility]  [address]  [facility] [address]  Phone: [phone] Fax: [phone]    [facility]  [address], [address]  [facility] [address]  Phone: [phone] Fax: [phone]      Medication System Management:  Affordability/Accessibility: Medicare  Adherence/Organization:  Patient states she is on too many medications and sometimes forgets to take them.       Social History     Social History Narrative    Not on file        HPI  Type II Diabetes  Current  Pharmacotherapy:   Metformin  mg BID  Lantus 48 units daily  Humalog 20 units TID  Ozempic 0.25 mg weekly     SECONDARY PREVENTION  - Statin? Rosuvastatin 40 mg (patient states she is not sure if she is taking)    LDL: n/a   TC: n/a  - ACE-I/ARB? Losartan 50 mg   UACR: n/a   BP: 163/78  - Aspirin? none    -The ASCVD Risk score (Valencia DK, et al., 2019) failed to calculate for the following reasons:    The patient has a prior MI or stroke diagnosis      Current monitoring regimen:   Patient is using: finger sticks     SMBG Fasting Readings:   Mornings: 120s  Afternoon and Evenings: 130s    Patient reports her highest reading has been 187.    Exercise: Patient reports she has been doing arm and leg exercises.     Objective     There were no vitals taken for this visit.   BP Readings from Last 4 Encounters:   09/13/24 163/78   09/09/24  "139/74   08/20/24 143/82      There were no vitals filed for this visit.     LAB  Lab Results   Component Value Date    BILITOT 0.6 09/09/2024    CALCIUM 9.3 09/12/2024    CO2 26 09/12/2024     09/12/2024    CREATININE 0.95 09/12/2024    GLUCOSE 120 (H) 09/12/2024    ALKPHOS 126 09/09/2024    K 3.7 09/12/2024    PROT 6.9 09/09/2024     09/12/2024    AST 15 09/09/2024    ALT 18 09/09/2024    BUN 28 (H) 09/12/2024    ANIONGAP 13 09/12/2024    PHOS 2.8 09/12/2024    ALBUMIN 4.0 09/12/2024     No results found for: \"TRIG\", \"CHOL\", \"LDLCALC\", \"HDL\"  Lab Results   Component Value Date    HGBA1C 10.6 (H) 09/09/2024         Current Outpatient Medications   Medication Instructions    acetaminophen-codeine (Tylenol w/ Codeine #3) 300-30 mg tablet 1 tablet, oral, Every 6 hours PRN    albuterol 90 mcg/actuation inhaler Inhale 2 puffs every 4 hours as needed for shortness of breath. You should use your inhaler every 4 hours while you are awake for the next 3 days, until your breathing is better after your asthma flare.    ALPRAZolam (XANAX) 1 mg, oral    azithromycin (Zithromax) 250 mg tablet     Blood glucose monitoring meter kit kit 1 each, miscellaneous, 4 times daily    brimonidine-timoloL (Combigan) 0.2-0.5 % ophthalmic solution 1 drop BID    cyanocobalamin (Vitamin B-12) 1,000 mcg tablet Take 1 tablet every day by oral route in the morning.    dextrose 50 % injection EVERY 4 HOURS    diclofenac (Voltaren) 50 mg EC tablet 1 tablet, oral, 2 times daily PRN    dulaglutide (Trulicity) 1.5 mg/0.5 mL pen injector injection subcutaneous    ergocalciferol (Vitamin D-2) 1.25 MG (77345 UT) capsule 1 capsule, oral, Once Weekly, Every Monday    ferrous sulfate (325 mg ferrous sulfate) 325 mg, oral, Daily with breakfast    fluticasone furoate-vilanteroL (Breo Ellipta) 100-25 mcg/dose inhaler inhalation    fluticasone/vilanterol (BREO ELLIPTA INHL) 1 puff daily    FreeStyle Phu 2 Sensor kit Use as instructed. Replace every " 14 days.    gabapentin (Neurontin) 300 mg capsule 1 capsule, oral, 2 times daily    glimepiride (Amaryl) 4 mg tablet Take 1 tablet every day by oral route at dinner for 30 days.    HumaLOG KwikPen Insulin 20 Units, subcutaneous, 3 times daily (morning, midday, late afternoon), Take with meals. Take 27 units before meals while you are on steroids and then go back to taking 20 units before meals once you are off the steroids.    hydroCHLOROthiazide (HYDRODiuril) 25 mg tablet 1 tablet, oral, Daily    HYDROcodone-acetaminophen (Norco) 5-325 mg tablet oral    ipratropium-albuteroL (Duo-Neb) 0.5-2.5 mg/3 mL nebulizer solution 3 mL, nebulization, Every 6 hours PRN    Lantus Solostar U-100 Insulin 48 Units, subcutaneous, Nightly    latanoprost (Xalatan) 0.005 % ophthalmic solution 1 drop, Right Eye, 2 times daily    losartan (Cozaar) 50 mg tablet 1 tablet, oral, Daily    metFORMIN (Glucophage) 500 mg tablet 0.5 tablets, oral, 2 times daily (morning and late afternoon)    metoprolol tartrate (Lopressor) 50 mg tablet 1.5 tablets, oral, 2 times daily    mometasone-formoterol (Dulera 100) 100-5 mcg/actuation inhaler 2 puffs, inhalation, 2 times daily RT    montelukast (SINGULAIR) 10 mg, oral, Nightly    Mucus Relief  mg, oral, 2 times daily PRN, Do not crush, chew, or split.    ondansetron ODT (Zofran-ODT) 4 mg disintegrating tablet DISSOLVE 1 TABLET ON THE TONGUE EVERY 6 HOURS FOR NAUSEA    OneTouch Verio test strips strip USE TO CHECK BLOOD SUGAR 3 TIMES DAILY    Ozempic 0.25 mg, subcutaneous, Weekly, Every Sunday    pantoprazole (PROTONIX) 40 mg, oral, Daily before breakfast, Do not crush, chew, or split.    pregabalin (Lyrica) 50 mg capsule 1 capsule, oral, 3 times daily    rosuvastatin (Crestor) 40 mg tablet 1 tablet, oral, Nightly    travoprost (Travatan Z) 0.004 % drops ophthalmic solution 1 drop    verapamil (Calan) 120 mg tablet 1 tablet, oral, 3 times daily    white petrolatum-mineral oiL (Tears Naturale PM)  94-3 % ophthalmic ointment 1 Application, Left Eye, As needed    zolpidem (AMBIEN) 10 mg, oral, Nightly        DRUG INTERACTIONS  None at time of review      Assessment/Plan   Problem List Items Addressed This Visit       Type 2 diabetes mellitus     Patient has uncontrolled diabetes. Patient reports she has been taking her diabetes medications as directed and has been adherent. Patient reported the pharmacy stated that her insurance would not pay for freestyle maria fernanda. Patient reports she has been doing arm and leg exercises which is helping her in being more active. Patient reports her upcoming surgery got rescheduled. Recommend restarting Ozempic as patient stated she has not been taking. Patient reports no side effects with her medications. Recommend to check blood pressure twice daily.     Plan    CONTINUE   Metformin  mg BID  Lantus 48 units daily  Humalog 20 units TID    RESTART Ozempic 0.25 mg weekly (patient reports she has not taken in months)         Relevant Orders    Referral to Clinical Pharmacy       Follow Up: 11/5 @1 pm    Continue all meds under the continuation of care with the referring provider and clinical pharmacy team.    Silva RagsdaleD  PGY-1 Pharmacy Resident     Verbal consent to manage patient's drug therapy was obtained from the patient. They were informed they may decline to participate or withdraw from participation in pharmacy services at any time.

## 2024-10-09 ENCOUNTER — APPOINTMENT (OUTPATIENT)
Dept: PHARMACY | Facility: HOSPITAL | Age: 69
End: 2024-10-09
Payer: MEDICARE

## 2024-10-09 DIAGNOSIS — Z79.4 TYPE 2 DIABETES MELLITUS WITHOUT COMPLICATION, WITH LONG-TERM CURRENT USE OF INSULIN (MULTI): ICD-10-CM

## 2024-10-09 DIAGNOSIS — M51.04 THORACIC DISC DISEASE WITH MYELOPATHY: ICD-10-CM

## 2024-10-09 DIAGNOSIS — E11.9 TYPE 2 DIABETES MELLITUS WITHOUT COMPLICATION, UNSPECIFIED WHETHER LONG TERM INSULIN USE (MULTI): Primary | ICD-10-CM

## 2024-10-09 DIAGNOSIS — E11.9 TYPE 2 DIABETES MELLITUS WITHOUT COMPLICATION, WITH LONG-TERM CURRENT USE OF INSULIN (MULTI): ICD-10-CM

## 2024-10-09 PROBLEM — M51.9 THORACIC DISC DISEASE: Status: ACTIVE | Noted: 2024-10-09

## 2024-10-09 RX ORDER — BLOOD-GLUCOSE,RECEIVER,CONT
EACH MISCELLANEOUS
Qty: 1 EACH | Refills: 0 | Status: SHIPPED | OUTPATIENT
Start: 2024-10-09

## 2024-10-09 RX ORDER — ACETAMINOPHEN 500 MG
TABLET ORAL
Qty: 1 KIT | Refills: 0 | Status: SHIPPED | OUTPATIENT
Start: 2024-10-09

## 2024-10-09 RX ORDER — BLOOD-GLUCOSE SENSOR
EACH MISCELLANEOUS
Qty: 2 EACH | Refills: 3 | Status: SHIPPED | OUTPATIENT
Start: 2024-10-09 | End: 2024-10-11 | Stop reason: WASHOUT

## 2024-10-09 NOTE — PROGRESS NOTES
"Mother called reports negative rapid home covid test 10/29/21. Reports right left ear pain and sore throat.     Reason for Disposition    [1] Earache AND [2] MODERATE pain OR SEVERE pain inadequately treated per guideline advice    Additional Information    Negative: Ear tubes in place    Negative: [1] Diagnosed ear infection within past 10 days (may or may not be on antibiotics) AND [2] symptoms continue    Negative: [1] Painful ear canal AND [2] has been swimming    Negative: Full or muffled sensation in the ear, but no pain    Negative: Due to airplane or mountain travel    Negative: [1] Crying AND [2] cause is unclear    Negative: Followed an injury to the ear    Negative: [1] Stiff neck (can't touch chin to chest) AND [2] fever    Negative: Long, pointed object was inserted into the ear canal (e.g. a pencil or stick)    Negative: [1] Fever AND [2] > 105 F (40.6 C) by any route OR axillary > 104 F (40 C)    Negative: [1] Fever AND [2] weak immune system (sickle cell disease, HIV, splenectomy, chemotherapy, organ transplant, chronic oral steroids, etc)    Negative: Child sounds very sick or weak to the triager    Negative: [1] SEVERE pain (excruciating) AND [2] not improved 2 hours after pain medicine (ibuprofen preferred)    Negative: [1] Earache causes inconsolable crying AND [2] not improved 2 hours after pain medicine    Negative: [1] Pink or red swelling behind the ear AND [2] fever    Negative: Outer ear is red, swollen and painful    Negative: New onset of balance problem (e.g., walking is very unsteady or falling)    Answer Assessment - Initial Assessment Questions  1. LOCATION: \"Which ear is involved?\"       Right left  2. ONSET: \"When did the ear start hurting?\"       10/30/21  3. SEVERITY: \"How bad is the pain?\" (Dull earache vs screaming with pain)       - MILD: doesn't interfere with normal activities      - MODERATE: interferes with normal activities or awakens from sleep      - SEVERE: excruciating " I reviewed the progress note and agree with the resident’s findings and plans as written. Case discussed with resident.    Amelia Dover, PharmD           "pain, can't do any normal activities      4  4. URI SYMPTOMS: \"Does your child have a runny nose or cough?\"       Sore throat congestion   5. FEVER: \"Does your child have a fever?\" If so, ask: \"What is it, how was it measured and when did it start?\"       no  6. CHILD'S APPEARANCE: \"How sick is your child acting?\" \" What is he doing right now?\" If asleep, ask: \"How was he acting before he went to sleep?\"       No concerns  7. CAUSE: \"What do you think is causing this earache?\"      unknown    Protocols used: EARACHE-P-AH      "

## 2024-10-09 NOTE — ASSESSMENT & PLAN NOTE
Patient has uncontrolled diabetes. Patient reports she has been taking her diabetes medications as directed and has been adherent. Patient reported the pharmacy stated that her insurance would not pay for freestyle maria fernanda. Patient reports she has been doing arm and leg exercises which is helping her in being more active. Patient reports her upcoming surgery got rescheduled. Recommend restarting Ozempic as patient stated she has not been taking. Patient reports no side effects with her medications. Recommend to check blood pressure twice daily.     Plan    CONTINUE   Metformin  mg BID  Lantus 48 units daily  Humalog 20 units TID    RESTART Ozempic 0.25 mg weekly (patient reports she has not taken in months)

## 2024-10-11 ENCOUNTER — TELEPHONE (OUTPATIENT)
Dept: ENDOCRINOLOGY | Facility: CLINIC | Age: 69
End: 2024-10-11
Payer: MEDICARE

## 2024-10-11 DIAGNOSIS — E11.65 TYPE 2 DIABETES MELLITUS WITH HYPERGLYCEMIA, WITH LONG-TERM CURRENT USE OF INSULIN: Primary | ICD-10-CM

## 2024-10-11 DIAGNOSIS — Z79.4 TYPE 2 DIABETES MELLITUS WITH HYPERGLYCEMIA, WITH LONG-TERM CURRENT USE OF INSULIN: Primary | ICD-10-CM

## 2024-10-11 RX ORDER — BLOOD-GLUCOSE SENSOR
EACH MISCELLANEOUS
Qty: 6 EACH | Refills: 0 | Status: SHIPPED | OUTPATIENT
Start: 2024-10-11

## 2024-10-11 NOTE — TELEPHONE ENCOUNTER
I have only seen her in the hospital as a consult. She preferred to follow up with an outpatient endocrinologist in [place], [place] (information for an endocrinologist was provided to her). She was referred to the [facility] program at discharge for follow-up. She was started on a Phu by pharmacy.  I will prescribe the Phu 3 plus as requested, but I will not be able to provide any refills. Further refills will need to come from her PCP or new outpatient endocrinologist. Will notify pharmacy.

## 2024-10-14 ENCOUNTER — DOCUMENTATION (OUTPATIENT)
Dept: NEUROSURGERY | Facility: HOSPITAL | Age: 69
End: 2024-10-14
Payer: MEDICARE

## 2024-10-14 NOTE — PROGRESS NOTES
I spoke with the patient on phone last week regarding her upcoming surgery and she saw her primary care physician and another physician for her medical comorbidities especially pulmonary issues and uncontrolled diabetes and she mentioned that she still has uncontrolled diabetes and symptomatic from her asthma and is not cleared for surgery as per the physicians who evaluated her.  I again explained to her regarding the nature of the pathology that she has which is thoracic spinal stenosis spinal cord compression that is causing her significant neurological symptoms with inability to walk and also told her that her surgery even though not emergent is not completely elective either as there is associated risk in further worsening of her weakness in the lower extremities to the point that she can become completely paralyzed if it is left untreated.    I mention to her to discuss the nature of the surgery which is important to preserve neurological function especially lower extremity paralysis which once happened will be reversible with the treating physicians especially pulmonologist so that she can get optimized to a reasonable extent to ensure that she can undergo the surgery sooner rather than later to prevent any untoward neurological compromise about which I did discuss with the patient very clearly.    She explained understanding and will talk to her PCP and her pulmonologist to see if she can be optimized for surgery as soon as possible.    At this point given the fact that we have failed to get her ready for surgery twice and we would wait for her to let my office know whenever she is cleared for surgery with acceptable risk and we will schedule her at that point of time.    I again explained to her that she should return to ED if she notices any further worsening of her symptoms especially bowel bladder symptoms and neurological compromise a lot of which she already has for several months now at this  point.    Ajith Lopez MD, Health system, [facility]   of Neurological Surgery  [facility] [employer]  Attending Surgeon  Director - Minimally Invasive Spine Surgery  [place], [place]      ---Some of this note was completed using Dragon voice recognition technology and sometimes the software misinterprets words. This may include unintended errors with respect to translation of words, typographical errors or grammar errors which may not have been identified prior to finalization of the chart note. Please take this into account when reading this note---

## 2024-10-15 ENCOUNTER — APPOINTMENT (OUTPATIENT)
Dept: NEUROSURGERY | Facility: CLINIC | Age: 69
End: 2024-10-15
Payer: MEDICARE

## 2024-10-22 ENCOUNTER — APPOINTMENT (OUTPATIENT)
Dept: NEUROSURGERY | Facility: CLINIC | Age: 69
End: 2024-10-22
Payer: MEDICARE

## 2024-10-30 ENCOUNTER — APPOINTMENT (OUTPATIENT)
Dept: NEUROSURGERY | Facility: HOSPITAL | Age: 69
End: 2024-10-30
Payer: MEDICARE

## 2024-11-05 ENCOUNTER — APPOINTMENT (OUTPATIENT)
Dept: PHARMACY | Facility: HOSPITAL | Age: 69
End: 2024-11-05
Payer: MEDICARE

## 2024-11-05 DIAGNOSIS — Z79.4 TYPE 2 DIABETES MELLITUS WITHOUT COMPLICATION, WITH LONG-TERM CURRENT USE OF INSULIN (MULTI): ICD-10-CM

## 2024-11-05 DIAGNOSIS — I10 ESSENTIAL HYPERTENSION: Primary | ICD-10-CM

## 2024-11-05 DIAGNOSIS — E11.9 TYPE 2 DIABETES MELLITUS WITHOUT COMPLICATION, WITH LONG-TERM CURRENT USE OF INSULIN (MULTI): ICD-10-CM

## 2024-11-05 RX ORDER — SEMAGLUTIDE 0.68 MG/ML
0.5 INJECTION, SOLUTION SUBCUTANEOUS WEEKLY
Qty: 3 ML | Refills: 0 | Status: SHIPPED | OUTPATIENT
Start: 2024-11-05

## 2024-11-05 NOTE — PROGRESS NOTES
Pharmacy Post-Discharge Visit    Abbie Mayo is a 69 y.o. female was referred to Clinical Pharmacy Team to complete a post-discharge medication optimization and monitoring visit.  The patient was referred for their Diabetes and Hypertension.    Admission Date: 9/9/24  Discharge Date: 9/13/24    Referring Provider: Renu Luu MD  PCP: No Assigned PCP Generic Provider, MD - Patient reports that she sees a PCP outside of .      Subjective   Allergies   Allergen Reactions    Suvorexant Other     Not sure       [id number] - [place], [place] - [address] [facility] [address] [facility]  [address]  [facility] [address]  Phone: [phone] Fax: [phone]    [facility]  [address], [address]  [facility] [address]  Phone: [phone] Fax: [phone]      Medication System Management:  Affordability/Accessibility: Medicare  Adherence/Organization: Patient reports she that she has a pill container. Still concerned for adherence.       Social History     Social History Narrative    Not on file          HPI  Type II Diabetes  Current  Pharmacotherapy:   Metformin  mg BID  Lantus 48 units daily  Humalog 20 TID   Ozempic 0.25 mg weekly     SECONDARY PREVENTION  - Statin? Rosuvastatin 40 mg  - ACE-I/ARB? Losartan 50 mg  - Aspirin? None    -The ASCVD Risk score (Funmi VEGAS, et al., 2019) failed to calculate for the following reasons:    Risk score cannot be calculated because patient has a medical history suggesting prior/existing ASCVD      Current monitoring regimen:   Patient is using: Finger sticks     SMBG Fasting Readings:     Mornings: 121,129,135  Evenings: 169       Diet/Lifestyle:    Arm and leg exercises which is helping her in being more active      Objective     There were no vitals taken for this visit.   BP Readings from Last 4 Encounters:   09/13/24 163/78   09/09/24 139/74   08/20/24 143/82      There were no vitals filed for this visit.  "    LAB  Lab Results   Component Value Date    BILITOT 0.6 09/09/2024    CALCIUM 9.3 09/12/2024    CO2 26 09/12/2024     09/12/2024    CREATININE 0.95 09/12/2024    GLUCOSE 120 (H) 09/12/2024    ALKPHOS 126 09/09/2024    K 3.7 09/12/2024    PROT 6.9 09/09/2024     09/12/2024    AST 15 09/09/2024    ALT 18 09/09/2024    BUN 28 (H) 09/12/2024    ANIONGAP 13 09/12/2024    PHOS 2.8 09/12/2024    ALBUMIN 4.0 09/12/2024     No results found for: \"TRIG\", \"CHOL\", \"LDLCALC\", \"HDL\"  Lab Results   Component Value Date    HGBA1C 10.6 (H) 09/09/2024         Current Outpatient Medications   Medication Instructions    acetaminophen-codeine (Tylenol w/ Codeine #3) 300-30 mg tablet 1 tablet, oral, Every 6 hours PRN    albuterol 90 mcg/actuation inhaler Inhale 2 puffs every 4 hours as needed for shortness of breath. You should use your inhaler every 4 hours while you are awake for the next 3 days, until your breathing is better after your asthma flare.    ALPRAZolam (XANAX) 1 mg, oral    azithromycin (Zithromax) 250 mg tablet     Blood glucose monitoring meter kit kit 1 each, miscellaneous, 4 times daily    blood pressure monitor (Blood Pressure Kit) kit Use as directed to check blood pressure twice daily.    brimonidine-timoloL (Combigan) 0.2-0.5 % ophthalmic solution 1 drop BID    cyanocobalamin (Vitamin B-12) 1,000 mcg tablet Take 1 tablet every day by oral route in the morning.    dextrose 50 % injection EVERY 4 HOURS    diclofenac (Voltaren) 50 mg EC tablet 1 tablet, oral, 2 times daily PRN    ergocalciferol (Vitamin D-2) 1.25 MG (42630 UT) capsule 1 capsule, oral, Once Weekly, Every Monday    ferrous sulfate (325 mg ferrous sulfate) 325 mg, oral, Daily with breakfast    fluticasone furoate-vilanteroL (Breo Ellipta) 100-25 mcg/dose inhaler inhalation    fluticasone/vilanterol (BREO ELLIPTA INHL) 1 puff daily    FreeStyle Phu 3 Plus Sensor device Change sensor every 15 days    FreeStyle Phu 3 [place] misc Use as " instructed    gabapentin (Neurontin) 300 mg capsule 1 capsule, oral, 2 times daily    glimepiride (Amaryl) 4 mg tablet Take 1 tablet every day by oral route at dinner for 30 days.    HumaLOG KwikPen Insulin 20 Units, subcutaneous, 3 times daily (morning, midday, late afternoon), Take with meals. Take 27 units before meals while you are on steroids and then go back to taking 20 units before meals once you are off the steroids.    hydroCHLOROthiazide (HYDRODiuril) 25 mg tablet 1 tablet, oral, Daily    HYDROcodone-acetaminophen (Norco) 5-325 mg tablet oral    ipratropium-albuteroL (Duo-Neb) 0.5-2.5 mg/3 mL nebulizer solution 3 mL, nebulization, Every 6 hours PRN    Lantus Solostar U-100 Insulin 48 Units, subcutaneous, Nightly    latanoprost (Xalatan) 0.005 % ophthalmic solution 1 drop, Right Eye, 2 times daily    losartan (Cozaar) 50 mg tablet 1 tablet, oral, Daily    metFORMIN (Glucophage) 500 mg tablet 0.5 tablets, oral, 2 times daily (morning and late afternoon)    metoprolol tartrate (Lopressor) 50 mg tablet 1.5 tablets, oral, 2 times daily    mometasone-formoterol (Dulera 100) 100-5 mcg/actuation inhaler 2 puffs, inhalation, 2 times daily RT    montelukast (SINGULAIR) 10 mg, oral, Nightly    Mucus Relief  mg, oral, 2 times daily PRN, Do not crush, chew, or split.    ondansetron ODT (Zofran-ODT) 4 mg disintegrating tablet DISSOLVE 1 TABLET ON THE TONGUE EVERY 6 HOURS FOR NAUSEA    OneTouch Verio test strips strip USE TO CHECK BLOOD SUGAR 3 TIMES DAILY    Ozempic 0.25 mg, subcutaneous, Weekly, Every Sunday    pantoprazole (PROTONIX) 40 mg, oral, Daily before breakfast, Do not crush, chew, or split.    pregabalin (Lyrica) 50 mg capsule 1 capsule, oral, 3 times daily    rosuvastatin (Crestor) 40 mg tablet 1 tablet, oral, Nightly    travoprost (Travatan Z) 0.004 % drops ophthalmic solution 1 drop    verapamil (Calan) 120 mg tablet 1 tablet, oral, 3 times daily    white petrolatum-mineral oiL (Tears Naturale PM)  94-3 % ophthalmic ointment 1 Application, Left Eye, As needed    zolpidem (AMBIEN) 10 mg, oral, Nightly        DRUG INTERACTIONS  None at time of review      Assessment/Plan   Problem List Items Addressed This Visit       Type 2 diabetes mellitus     Patient has uncontrolled diabetes. Patient reports she has been taking her diabetes medications as directed and has been adherent. Patient states she was not able to get the freestyle maria fernanda due to the cost. Patient reports no side effects with current medications    Plan    INCREASE to Ozempic 0.50 mg weekly     CONTINUE     Metformin  mg BID  Lantus 48 units daily  Humalog 20 units TID         Essential hypertension - Primary     Patient states she has not been checking blood pressure due to insurance not covering blood pressure cuff. Patient states that the pharmacy sent her papers to fill out in order to get blood pressure cuff.             Follow Up: 12/4/24 @ 11 am    Continue all meds under the continuation of care with the referring provider and clinical pharmacy team.    Mart Rodriguez PharmD  PGY-1 Pharmacy Resident     Verbal consent to manage patient's drug therapy was obtained from the patient. They were informed they may decline to participate or withdraw from participation in pharmacy services at any time.

## 2024-11-05 NOTE — ASSESSMENT & PLAN NOTE
Patient has uncontrolled diabetes. Patient reports she has been taking her diabetes medications as directed and has been adherent. Patient states she was not able to get the freestyle maria fernanda due to the cost. Patient reports no side effects with current medications    Plan    INCREASE to Ozempic 0.50 mg weekly     CONTINUE     Metformin  mg BID  Lantus 48 units daily  Humalog 20 units TID

## 2024-11-05 NOTE — ASSESSMENT & PLAN NOTE
Patient states she has not been checking blood pressure due to insurance not covering blood pressure cuff. Patient states that the pharmacy sent her papers to fill out in order to get blood pressure cuff.

## 2024-11-26 ENCOUNTER — APPOINTMENT (OUTPATIENT)
Dept: NEUROSURGERY | Facility: CLINIC | Age: 69
End: 2024-11-26
Payer: MEDICARE

## 2024-12-04 ENCOUNTER — APPOINTMENT (OUTPATIENT)
Dept: PHARMACY | Facility: HOSPITAL | Age: 69
End: 2024-12-04
Payer: MEDICARE

## 2024-12-04 DIAGNOSIS — Z79.4 TYPE 2 DIABETES MELLITUS WITHOUT COMPLICATION, WITH LONG-TERM CURRENT USE OF INSULIN (MULTI): ICD-10-CM

## 2024-12-04 DIAGNOSIS — E11.9 TYPE 2 DIABETES MELLITUS WITHOUT COMPLICATION, WITH LONG-TERM CURRENT USE OF INSULIN (MULTI): ICD-10-CM

## 2024-12-04 DIAGNOSIS — I10 ESSENTIAL HYPERTENSION: ICD-10-CM

## 2024-12-04 NOTE — ASSESSMENT & PLAN NOTE
Patient has uncontrolled diabetes. Patient states that [facility] has been having trouble keeping Ozempic and Humalog in stock. Patient states that she has not taken the Ozempic in a month due to GI upset in which she thought the medication was old so she threw it away. Counseled on side effects of Ozempic. Also, patient reports that she has not taken the Humalog in a few weeks due to the pharmacy not having the medication in stock.     Plan    RESTART Ozempic 0.25 mg weekly    CONTINUE     Metformin  mg BID  Lantus 48 units daily  Humalog 20 units TID

## 2024-12-04 NOTE — ASSESSMENT & PLAN NOTE
Patient states that she has not been checking her blood pressure due to not having a blood pressure cuff. Patient states that she has to fill out some paperwork from the pharmacy to get blood pressure cuff for no copay.

## 2024-12-04 NOTE — PROGRESS NOTES
Clinical Pharmacy Appointment    Patient ID: Abbie Mayo is a 69 y.o. female who presents for Diabetes and Hypertension.    Pt is here for Follow Up appointment.     Referring Provider: Renu Luu MD  PCP: No Assigned PCP Generic Provider, MD   Last visit with PCP: 9/9/24   Next visit with PCP: none scheduled- patient states that she is calling around to see which providers are taking new patients.      Subjective     HPI  Type II Diabetes  Current  Pharmacotherapy:   Metformin  mg BID  Lantus 48 units daily  Humalog 20 TID   Ozempic 0.25 mg weekly     SECONDARY PREVENTION  - Statin? Rosuvastatin 40 mg  - ACE-I/ARB? Lostartan 50 mg  - Aspirin? None    -The ASCVD Risk score (Funmi VEGAS, et al., 2019) failed to calculate for the following reasons:    Risk score cannot be calculated because patient has a medical history suggesting prior/existing ASCVD      Current monitoring regimen:   Patient is using: Finger sticks     SMBG Fasting Readings: Patient states her lowest blood sugars have been in the 130s and the highest have been 220s    Hypoglycemia? No     Diet/Lifestyle: Patient states that she typically only eats dinner because she does not have an appetite most days    Drug Interactions  No relevant drug interactions were noted.    Medication System Management  Patient's preferred pharmacy: [facility]  Adherence/Organization: Patient states that she has a pill container. Still concerned about adherence with insulin and Ozempic.  Affordability/Accessibility: Patient states that her Ozempic was $193      Objective   Allergies   Allergen Reactions    Suvorexant Other     Not sure     Social History     Social History Narrative    Not on file      Medication Review  Current Outpatient Medications   Medication Instructions    acetaminophen-codeine (Tylenol w/ Codeine #3) 300-30 mg tablet 1 tablet, oral, Every 6 hours PRN    albuterol 90 mcg/actuation inhaler Inhale 2 puffs every 4 hours as needed for  shortness of breath. You should use your inhaler every 4 hours while you are awake for the next 3 days, until your breathing is better after your asthma flare.    ALPRAZolam (XANAX) 1 mg, oral    azithromycin (Zithromax) 250 mg tablet     Blood glucose monitoring meter kit kit 1 each, miscellaneous, 4 times daily    blood pressure monitor (Blood Pressure Kit) kit Use as directed to check blood pressure twice daily.    brimonidine-timoloL (Combigan) 0.2-0.5 % ophthalmic solution 1 drop BID    cyanocobalamin (Vitamin B-12) 1,000 mcg tablet Take 1 tablet every day by oral route in the morning.    dextrose 50 % injection EVERY 4 HOURS    diclofenac (Voltaren) 50 mg EC tablet 1 tablet, oral, 2 times daily PRN    ergocalciferol (Vitamin D-2) 1.25 MG (21175 UT) capsule 1 capsule, oral, Once Weekly, Every Monday    ferrous sulfate (325 mg ferrous sulfate) 325 mg, oral, Daily with breakfast    fluticasone furoate-vilanteroL (Breo Ellipta) 100-25 mcg/dose inhaler inhalation    fluticasone/vilanterol (BREO ELLIPTA INHL) 1 puff daily    FreeStyle Phu 3 Plus Sensor device Change sensor every 15 days    FreeStyle Phu 3 [place] misc Use as instructed    gabapentin (Neurontin) 300 mg capsule 1 capsule, oral, 2 times daily    glimepiride (Amaryl) 4 mg tablet Take 1 tablet every day by oral route at dinner for 30 days.    HumaLOG KwikPen Insulin 20 Units, subcutaneous, 3 times daily (morning, midday, late afternoon), Take with meals. Take 27 units before meals while you are on steroids and then go back to taking 20 units before meals once you are off the steroids.    hydroCHLOROthiazide (HYDRODiuril) 25 mg tablet 1 tablet, oral, Daily    HYDROcodone-acetaminophen (Norco) 5-325 mg tablet oral    ipratropium-albuteroL (Duo-Neb) 0.5-2.5 mg/3 mL nebulizer solution 3 mL, nebulization, Every 6 hours PRN    Lantus Solostar U-100 Insulin 48 Units, subcutaneous, Nightly    latanoprost (Xalatan) 0.005 % ophthalmic solution 1 drop, Right  "Eye, 2 times daily    losartan (Cozaar) 50 mg tablet 1 tablet, oral, Daily    metFORMIN (Glucophage) 500 mg tablet 0.5 tablets, oral, 2 times daily (morning and late afternoon)    metoprolol tartrate (Lopressor) 50 mg tablet 1.5 tablets, oral, 2 times daily    mometasone-formoterol (Dulera 100) 100-5 mcg/actuation inhaler 2 puffs, inhalation, 2 times daily RT    montelukast (SINGULAIR) 10 mg, oral, Nightly    Mucus Relief  mg, oral, 2 times daily PRN, Do not crush, chew, or split.    ondansetron ODT (Zofran-ODT) 4 mg disintegrating tablet DISSOLVE 1 TABLET ON THE TONGUE EVERY 6 HOURS FOR NAUSEA    OneTouch Verio test strips strip USE TO CHECK BLOOD SUGAR 3 TIMES DAILY    Ozempic 0.5 mg, subcutaneous, Weekly, Every Sunday    pantoprazole (PROTONIX) 40 mg, oral, Daily before breakfast, Do not crush, chew, or split.    pregabalin (Lyrica) 50 mg capsule 1 capsule, oral, 3 times daily    rosuvastatin (Crestor) 40 mg tablet 1 tablet, oral, Nightly    travoprost (Travatan Z) 0.004 % drops ophthalmic solution 1 drop    verapamil (Calan) 120 mg tablet 1 tablet, oral, 3 times daily    white petrolatum-mineral oiL (Tears Naturale PM) 94-3 % ophthalmic ointment 1 Application, Left Eye, As needed    zolpidem (AMBIEN) 10 mg, oral, Nightly      Vitals  BP Readings from Last 2 Encounters:   09/13/24 163/78   09/09/24 139/74     BMI Readings from Last 1 Encounters:   09/09/24 35.51 kg/m²      Labs  A1C  Lab Results   Component Value Date    HGBA1C 10.6 (H) 09/09/2024    HGBA1C 9.9 (H) 12/15/2023    HGBA1C 10.0 (H) 04/26/2023     BMP  Lab Results   Component Value Date    CALCIUM 9.3 09/12/2024     09/12/2024    K 3.7 09/12/2024    CO2 26 09/12/2024     09/12/2024    BUN 28 (H) 09/12/2024    CREATININE 0.95 09/12/2024    EGFR 65 09/12/2024     LFTs  Lab Results   Component Value Date    ALT 18 09/09/2024    AST 15 09/09/2024    ALKPHOS 126 09/09/2024    BILITOT 0.6 09/09/2024     FLP  No results found for: \"TRIG\", " "\"CHOL\", \"LDLF\", \"LDLCALC\", \"HDL\"  Urine Microalbumin  No results found for: \"MICROALBCREA\"  Weight Management  Wt Readings from Last 3 Encounters:   09/09/24 99.8 kg (220 lb)   09/09/24 99.8 kg (220 lb)   08/23/24 99.8 kg (220 lb)      There is no height or weight on file to calculate BMI.     Assessment/Plan   Problem List Items Addressed This Visit       Type 2 diabetes mellitus     Patient has uncontrolled diabetes. Patient states that [facility] has been having trouble keeping Ozempic and Humalog in stock. Patient states that she has not taken the Ozempic in a month due to GI upset in which she thought the medication was old so she threw it away. Counseled on side effects of Ozempic. Also, patient reports that she has not taken the Humalog in a few weeks due to the pharmacy not having the medication in stock.     Plan    RESTART Ozempic 0.25 mg weekly    CONTINUE     Metformin  mg BID  Lantus 48 units daily  Humalog 20 units TID         Essential hypertension     Patient states that she has not been checking her blood pressure due to not having a blood pressure cuff. Patient states that she has to fill out some paperwork from the pharmacy to get blood pressure cuff for no copay.            Patient discussion: Patient states that she paid $193 for Ozempic. Discussed  PAP with patient. Patient states that she will send in 1099 form.     Clinical Pharmacist follow-up: 1/3/25, Telehealth visit 12 pm    Continue all meds under the continuation of care with the referring provider and clinical pharmacy team.    Mart Rodriguez, PharmD  PGY-1 Pharmacy Resident     Verbal consent to manage patient's drug therapy was obtained from the patient. They were informed they may decline to participate or withdraw from participation in pharmacy services at any time.  "

## 2025-01-03 ENCOUNTER — APPOINTMENT (OUTPATIENT)
Dept: PHARMACY | Facility: HOSPITAL | Age: 70
End: 2025-01-03
Payer: MEDICARE

## 2025-01-03 DIAGNOSIS — Z79.4 TYPE 2 DIABETES MELLITUS WITHOUT COMPLICATION, WITH LONG-TERM CURRENT USE OF INSULIN (MULTI): ICD-10-CM

## 2025-01-03 DIAGNOSIS — I10 ESSENTIAL HYPERTENSION: ICD-10-CM

## 2025-01-03 DIAGNOSIS — E11.9 TYPE 2 DIABETES MELLITUS WITHOUT COMPLICATION, WITH LONG-TERM CURRENT USE OF INSULIN (MULTI): ICD-10-CM

## 2025-01-03 NOTE — PROGRESS NOTES
Clinical Pharmacy Appointment    Patient ID: Abbie Mayo is a 69 y.o. female who presents for Diabetes.    Pt is here for Follow Up appointment.     Referring Provider: Renu Luu MD  PCP: No Assigned PCP Generic Provider, MD   Last visit with PCP: 9/9/24   Next visit with PCP: none scheduled- patient states that she is calling around to see which providers are taking new patients.       Subjective     HPI  Type II Diabetes  Current  Pharmacotherapy:   Metformin  mg BID  Lantus 48 units daily  Humalog 20 TID   Ozempic 0.25 mg weekly     SECONDARY PREVENTION  - Statin? Rosuvastatin  - ACE-I/ARB? Losartan 50 mg  - Aspirin? None    -The ASCVD Risk score (Funmi VEGAS, et al., 2019) failed to calculate for the following reasons:    Risk score cannot be calculated because patient has a medical history suggesting prior/existing ASCVD      Current monitoring regimen:   Patient is using: Finger sticks     SMBG Fasting Readings: Patient states that her lowest blood sugars have been 69 and the highest 269. Patient states that her blood sugars are normally higher at night.     Hypoglycemia? No     Diet/Lifestyle: Patient reports that she is not able to get out of bed in which she is going to have to buy a hospital bed. Also, patient reports that she is not able to walk in which she is in a wheelchair.    Drug Interactions  No relevant drug interactions were noted.    Medication System Management  Patient's preferred pharmacy: [facility]  Adherence/Organization:  Patient states that she has a pill container. Still concerned about adherence with insulin and Ozempic.   Affordability/Accessibility: Patient reports Ozempic is expensive      Objective   Allergies   Allergen Reactions    Suvorexant Other     Not sure     Social History     Social History Narrative    Not on file      Medication Review  Current Outpatient Medications   Medication Instructions    acetaminophen-codeine (Tylenol w/ Codeine #3) 300-30 mg  Due to hyperparathyroidism  Consult renal  hydrate   tablet 1 tablet, oral, Every 6 hours PRN    albuterol 90 mcg/actuation inhaler Inhale 2 puffs every 4 hours as needed for shortness of breath. You should use your inhaler every 4 hours while you are awake for the next 3 days, until your breathing is better after your asthma flare.    ALPRAZolam (XANAX) 1 mg, oral    azithromycin (Zithromax) 250 mg tablet     Blood glucose monitoring meter kit kit 1 each, miscellaneous, 4 times daily    blood pressure monitor (Blood Pressure Kit) kit Use as directed to check blood pressure twice daily.    brimonidine-timoloL (Combigan) 0.2-0.5 % ophthalmic solution 1 drop BID    cyanocobalamin (Vitamin B-12) 1,000 mcg tablet Take 1 tablet every day by oral route in the morning.    dextrose 50 % injection EVERY 4 HOURS    diclofenac (Voltaren) 50 mg EC tablet 1 tablet, oral, 2 times daily PRN    ergocalciferol (Vitamin D-2) 1.25 MG (43616 UT) capsule 1 capsule, oral, Once Weekly, Every Monday    ferrous sulfate (325 mg ferrous sulfate) 325 mg, oral, Daily with breakfast    fluticasone furoate-vilanteroL (Breo Ellipta) 100-25 mcg/dose inhaler inhalation    fluticasone/vilanterol (BREO ELLIPTA INHL) 1 puff daily    FreeStyle Phu 3 Plus Sensor device Change sensor every 15 days    FreeStyle Phu 3 Diamond misc Use as instructed    gabapentin (Neurontin) 300 mg capsule 1 capsule, oral, 2 times daily    glimepiride (Amaryl) 4 mg tablet Take 1 tablet every day by oral route at dinner for 30 days.    HumaLOG KwikPen Insulin 20 Units, subcutaneous, 3 times daily (morning, midday, late afternoon), Take with meals. Take 27 units before meals while you are on steroids and then go back to taking 20 units before meals once you are off the steroids.    hydroCHLOROthiazide (HYDRODiuril) 25 mg tablet 1 tablet, oral, Daily    HYDROcodone-acetaminophen (Norco) 5-325 mg tablet oral    ipratropium-albuteroL (Duo-Neb) 0.5-2.5 mg/3 mL nebulizer solution 3 mL, nebulization, Every 6 hours PRN    Lantus  Solostar U-100 Insulin 48 Units, subcutaneous, Nightly    latanoprost (Xalatan) 0.005 % ophthalmic solution 1 drop, Right Eye, 2 times daily    losartan (Cozaar) 50 mg tablet 1 tablet, oral, Daily    metFORMIN (Glucophage) 500 mg tablet 0.5 tablets, oral, 2 times daily (morning and late afternoon)    metoprolol tartrate (Lopressor) 50 mg tablet 1.5 tablets, oral, 2 times daily    mometasone-formoterol (Dulera 100) 100-5 mcg/actuation inhaler 2 puffs, inhalation, 2 times daily RT    montelukast (SINGULAIR) 10 mg, oral, Nightly    Mucus Relief  mg, oral, 2 times daily PRN, Do not crush, chew, or split.    ondansetron ODT (Zofran-ODT) 4 mg disintegrating tablet DISSOLVE 1 TABLET ON THE TONGUE EVERY 6 HOURS FOR NAUSEA    OneTouch Verio test strips strip USE TO CHECK BLOOD SUGAR 3 TIMES DAILY    Ozempic 0.5 mg, subcutaneous, Weekly, Every Sunday    pantoprazole (PROTONIX) 40 mg, oral, Daily before breakfast, Do not crush, chew, or split.    pregabalin (Lyrica) 50 mg capsule 1 capsule, oral, 3 times daily    rosuvastatin (Crestor) 40 mg tablet 1 tablet, oral, Nightly    semaglutide 0.5 mg, subcutaneous, Weekly    travoprost (Travatan Z) 0.004 % drops ophthalmic solution 1 drop    verapamil (Calan) 120 mg tablet 1 tablet, oral, 3 times daily    white petrolatum-mineral oiL (Tears Naturale PM) 94-3 % ophthalmic ointment 1 Application, Left Eye, As needed    zolpidem (AMBIEN) 10 mg, oral, Nightly      Vitals  BP Readings from Last 2 Encounters:   09/13/24 163/78   09/09/24 139/74     BMI Readings from Last 1 Encounters:   09/09/24 35.51 kg/m²      Labs  A1C  Lab Results   Component Value Date    HGBA1C 10.6 (H) 09/09/2024    HGBA1C 9.9 (H) 12/15/2023    HGBA1C 10.0 (H) 04/26/2023     BMP  Lab Results   Component Value Date    CALCIUM 9.3 09/12/2024     09/12/2024    K 3.7 09/12/2024    CO2 26 09/12/2024     09/12/2024    BUN 28 (H) 09/12/2024    CREATININE 0.95 09/12/2024    EGFR 65 09/12/2024     LFTs  Lab  "Results   Component Value Date    ALT 18 09/09/2024    AST 15 09/09/2024    ALKPHOS 126 09/09/2024    BILITOT 0.6 09/09/2024     FLP  No results found for: \"TRIG\", \"CHOL\", \"LDLF\", \"LDLCALC\", \"HDL\"  Urine Microalbumin  No results found for: \"MICROALBCREA\"  Weight Management  Wt Readings from Last 3 Encounters:   09/09/24 99.8 kg (220 lb)   09/09/24 99.8 kg (220 lb)   08/23/24 99.8 kg (220 lb)      There is no height or weight on file to calculate BMI.     Assessment/Plan   Problem List Items Addressed This Visit       Type 2 diabetes mellitus     Patient has uncontrolled diabetes. Patient does not seem to be adherent to medication regimen. At last telehealth visit, patient reports that her local pharmacy has not had Humalog in stock but now reports that she has been able to get it. Patient states that she has lost 5 pounds since restarting Ozempic at the 0.25 mg dose. Patient would like to increase dose of Ozempic. Patient reports that her blood sugars have not been at goal and sugars are normally higher at night.    Plan    INCREASE Ozempic to 0.5 mg weekly.     CONTINUE   -Metformin  mg BID  -Lantus 48 units daily  -Humalog 20 units TID         Relevant Medications    semaglutide 0.25 mg or 0.5 mg (2 mg/3 mL) pen injector    Other Relevant Orders    Referral to Clinical Pharmacy    Essential hypertension      Patient states that she has not been checking her blood pressure due to not having a blood pressure cuff. At last telehealth visit, patient states that she has to fill out some paperwork from the pharmacy to get blood pressure cuff for no copay.             Relevant Orders    Referral to Clinical Pharmacy     Patient discussion: Patient states that she paid $193 for Ozempic. Discussed  PAP with patient. Patient states that she will send in 1099 form.     Clinical Pharmacist follow-up: 1/31/25, Telehealth visit at 3pm    Continue all meds under the continuation of care with the referring provider and " clinical pharmacy team.    Mart Rodriguez PharmD  PGY-1 Pharmacy Resident     Verbal consent to manage patient's drug therapy was obtained from the patient. They were informed they may decline to participate or withdraw from participation in pharmacy services at any time.

## 2025-01-03 NOTE — ASSESSMENT & PLAN NOTE
Patient states that she has not been checking her blood pressure due to not having a blood pressure cuff. At last telehealth visit, patient states that she has to fill out some paperwork from the pharmacy to get blood pressure cuff for no copay.

## 2025-01-03 NOTE — ASSESSMENT & PLAN NOTE
Patient has uncontrolled diabetes. Patient does not seem to be adherent to medication regimen. At last telehealth visit, patient reports that her local pharmacy has not had Humalog in stock but now reports that she has been able to get it. Patient states that she has lost 5 pounds since restarting Ozempic at the 0.25 mg dose. Patient would like to increase dose of Ozempic. Patient reports that her blood sugars have not been at goal and sugars are normally higher at night.    Plan    INCREASE Ozempic to 0.5 mg weekly.     CONTINUE   -Metformin  mg BID  -Lantus 48 units daily  -Humalog 20 units TID

## 2025-01-31 ENCOUNTER — APPOINTMENT (OUTPATIENT)
Dept: PHARMACY | Facility: HOSPITAL | Age: 70
End: 2025-01-31
Payer: MEDICARE

## 2025-02-05 ENCOUNTER — APPOINTMENT (OUTPATIENT)
Dept: PHARMACY | Facility: HOSPITAL | Age: 70
End: 2025-02-05
Payer: MEDICARE

## 2025-02-13 ENCOUNTER — APPOINTMENT (OUTPATIENT)
Dept: PHARMACY | Facility: HOSPITAL | Age: 70
End: 2025-02-13
Payer: MEDICARE

## 2025-03-06 ENCOUNTER — HOSPITAL ENCOUNTER (INPATIENT)
Age: 70
End: 2025-03-06
Payer: MEDICARE

## 2025-03-06 DIAGNOSIS — M48.04 THORACIC SPINAL STENOSIS: ICD-10-CM

## 2025-03-06 DIAGNOSIS — M24.28: ICD-10-CM

## 2025-03-06 DIAGNOSIS — M47.14 THORACIC MYELOPATHY: Primary | ICD-10-CM

## 2025-03-06 PROBLEM — G83.4 CAUDA EQUINA COMPRESSION (MULTI): Status: ACTIVE | Noted: 2025-03-06

## 2025-03-07 ENCOUNTER — APPOINTMENT (OUTPATIENT)
Dept: RADIOLOGY | Facility: HOSPITAL | Age: 70
DRG: 029 | End: 2025-03-07
Payer: MEDICARE

## 2025-03-07 ENCOUNTER — ANESTHESIA (OUTPATIENT)
Dept: OPERATING ROOM | Facility: HOSPITAL | Age: 70
End: 2025-03-07
Payer: MEDICARE

## 2025-03-07 ENCOUNTER — ANESTHESIA EVENT (OUTPATIENT)
Dept: OPERATING ROOM | Facility: HOSPITAL | Age: 70
End: 2025-03-07
Payer: MEDICARE

## 2025-03-07 ENCOUNTER — HOSPITAL ENCOUNTER (INPATIENT)
Facility: HOSPITAL | Age: 70
End: 2025-03-07
Attending: INTERNAL MEDICINE | Admitting: INTERNAL MEDICINE
Payer: MEDICARE

## 2025-03-07 ENCOUNTER — APPOINTMENT (OUTPATIENT)
Dept: CARDIOLOGY | Facility: HOSPITAL | Age: 70
DRG: 029 | End: 2025-03-07
Payer: MEDICARE

## 2025-03-07 DIAGNOSIS — M48.062 SPINAL STENOSIS OF LUMBAR REGION WITH NEUROGENIC CLAUDICATION: ICD-10-CM

## 2025-03-07 DIAGNOSIS — M48.04 THORACIC SPINAL STENOSIS: ICD-10-CM

## 2025-03-07 DIAGNOSIS — G83.4 CAUDA EQUINA COMPRESSION (MULTI): Primary | ICD-10-CM

## 2025-03-07 DIAGNOSIS — M24.28: ICD-10-CM

## 2025-03-07 DIAGNOSIS — Z79.4 TYPE 2 DIABETES MELLITUS WITH DIABETIC POLYNEUROPATHY, WITH LONG-TERM CURRENT USE OF INSULIN: ICD-10-CM

## 2025-03-07 DIAGNOSIS — M47.14 THORACIC MYELOPATHY: ICD-10-CM

## 2025-03-07 DIAGNOSIS — E11.42 TYPE 2 DIABETES MELLITUS WITH DIABETIC POLYNEUROPATHY, WITH LONG-TERM CURRENT USE OF INSULIN: ICD-10-CM

## 2025-03-07 LAB
ABO GROUP (TYPE) IN BLOOD: NORMAL
ANION GAP SERPL CALC-SCNC: 17 MMOL/L (ref 10–20)
ANTIBODY SCREEN: NORMAL
APTT PPP: 27 SECONDS (ref 26–36)
BUN SERPL-MCNC: 16 MG/DL (ref 6–23)
CALCIUM SERPL-MCNC: 9.3 MG/DL (ref 8.6–10.6)
CHLORIDE SERPL-SCNC: 104 MMOL/L (ref 98–107)
CO2 SERPL-SCNC: 22 MMOL/L (ref 21–32)
CREAT SERPL-MCNC: 0.86 MG/DL (ref 0.5–1.05)
EGFRCR SERPLBLD CKD-EPI 2021: 74 ML/MIN/1.73M*2
ERYTHROCYTE [DISTWIDTH] IN BLOOD BY AUTOMATED COUNT: 12.9 % (ref 11.5–14.5)
GLUCOSE BLD MANUAL STRIP-MCNC: 233 MG/DL (ref 74–99)
GLUCOSE BLD MANUAL STRIP-MCNC: 268 MG/DL (ref 74–99)
GLUCOSE BLD MANUAL STRIP-MCNC: 294 MG/DL (ref 74–99)
GLUCOSE BLD MANUAL STRIP-MCNC: 320 MG/DL (ref 74–99)
GLUCOSE BLD MANUAL STRIP-MCNC: 330 MG/DL (ref 74–99)
GLUCOSE BLD MANUAL STRIP-MCNC: 340 MG/DL (ref 74–99)
GLUCOSE SERPL-MCNC: 344 MG/DL (ref 74–99)
HCT VFR BLD AUTO: 40.8 % (ref 36–46)
HGB BLD-MCNC: 13.2 G/DL (ref 12–16)
INR PPP: 1 (ref 0.9–1.1)
MCH RBC QN AUTO: 27.3 PG (ref 26–34)
MCHC RBC AUTO-ENTMCNC: 32.4 G/DL (ref 32–36)
MCV RBC AUTO: 85 FL (ref 80–100)
NRBC BLD-RTO: 0 /100 WBCS (ref 0–0)
PLATELET # BLD AUTO: 258 X10*3/UL (ref 150–450)
POTASSIUM SERPL-SCNC: 4.2 MMOL/L (ref 3.5–5.3)
PROTHROMBIN TIME: 11.2 SECONDS (ref 9.8–12.4)
RBC # BLD AUTO: 4.83 X10*6/UL (ref 4–5.2)
RH FACTOR (ANTIGEN D): NORMAL
SODIUM SERPL-SCNC: 139 MMOL/L (ref 136–145)
WBC # BLD AUTO: 6 X10*3/UL (ref 4.4–11.3)

## 2025-03-07 PROCEDURE — 2500000002 HC RX 250 W HCPCS SELF ADMINISTERED DRUGS (ALT 637 FOR MEDICARE OP, ALT 636 FOR OP/ED): Performed by: NURSE PRACTITIONER

## 2025-03-07 PROCEDURE — 71045 X-RAY EXAM CHEST 1 VIEW: CPT

## 2025-03-07 PROCEDURE — 36415 COLL VENOUS BLD VENIPUNCTURE: CPT

## 2025-03-07 PROCEDURE — 93005 ELECTROCARDIOGRAM TRACING: CPT

## 2025-03-07 PROCEDURE — 2500000001 HC RX 250 WO HCPCS SELF ADMINISTERED DRUGS (ALT 637 FOR MEDICARE OP): Performed by: NURSE PRACTITIONER

## 2025-03-07 PROCEDURE — 72100 X-RAY EXAM L-S SPINE 2/3 VWS: CPT

## 2025-03-07 PROCEDURE — 86901 BLOOD TYPING SEROLOGIC RH(D): CPT

## 2025-03-07 PROCEDURE — 2500000002 HC RX 250 W HCPCS SELF ADMINISTERED DRUGS (ALT 637 FOR MEDICARE OP, ALT 636 FOR OP/ED): Performed by: STUDENT IN AN ORGANIZED HEALTH CARE EDUCATION/TRAINING PROGRAM

## 2025-03-07 PROCEDURE — 94640 AIRWAY INHALATION TREATMENT: CPT

## 2025-03-07 PROCEDURE — 93010 ELECTROCARDIOGRAM REPORT: CPT | Performed by: INTERNAL MEDICINE

## 2025-03-07 PROCEDURE — 72100 X-RAY EXAM L-S SPINE 2/3 VWS: CPT | Performed by: RADIOLOGY

## 2025-03-07 PROCEDURE — 2500000001 HC RX 250 WO HCPCS SELF ADMINISTERED DRUGS (ALT 637 FOR MEDICARE OP): Performed by: STUDENT IN AN ORGANIZED HEALTH CARE EDUCATION/TRAINING PROGRAM

## 2025-03-07 PROCEDURE — 99222 1ST HOSP IP/OBS MODERATE 55: CPT | Performed by: NURSE PRACTITIONER

## 2025-03-07 PROCEDURE — 72072 X-RAY EXAM THORAC SPINE 3VWS: CPT

## 2025-03-07 PROCEDURE — 82947 ASSAY GLUCOSE BLOOD QUANT: CPT

## 2025-03-07 PROCEDURE — 72072 X-RAY EXAM THORAC SPINE 3VWS: CPT | Performed by: RADIOLOGY

## 2025-03-07 PROCEDURE — 36415 COLL VENOUS BLD VENIPUNCTURE: CPT | Performed by: NURSE PRACTITIONER

## 2025-03-07 PROCEDURE — 99222 1ST HOSP IP/OBS MODERATE 55: CPT | Performed by: STUDENT IN AN ORGANIZED HEALTH CARE EDUCATION/TRAINING PROGRAM

## 2025-03-07 PROCEDURE — 1210000001 HC SEMI-PRIVATE ROOM DAILY

## 2025-03-07 PROCEDURE — 2500000004 HC RX 250 GENERAL PHARMACY W/ HCPCS (ALT 636 FOR OP/ED): Performed by: NURSE PRACTITIONER

## 2025-03-07 PROCEDURE — 85730 THROMBOPLASTIN TIME PARTIAL: CPT

## 2025-03-07 PROCEDURE — 85610 PROTHROMBIN TIME: CPT

## 2025-03-07 PROCEDURE — 82565 ASSAY OF CREATININE: CPT | Performed by: NURSE PRACTITIONER

## 2025-03-07 PROCEDURE — 84132 ASSAY OF SERUM POTASSIUM: CPT | Performed by: NURSE PRACTITIONER

## 2025-03-07 PROCEDURE — 85027 COMPLETE CBC AUTOMATED: CPT | Performed by: NURSE PRACTITIONER

## 2025-03-07 PROCEDURE — 2500000004 HC RX 250 GENERAL PHARMACY W/ HCPCS (ALT 636 FOR OP/ED): Performed by: STUDENT IN AN ORGANIZED HEALTH CARE EDUCATION/TRAINING PROGRAM

## 2025-03-07 RX ORDER — ALBUTEROL SULFATE 0.83 MG/ML
2.5 SOLUTION RESPIRATORY (INHALATION)
Status: DISCONTINUED | OUTPATIENT
Start: 2025-03-07 | End: 2025-03-08

## 2025-03-07 RX ORDER — ASPIRIN 81 MG/1
81 TABLET ORAL DAILY
Status: DISPENSED | OUTPATIENT
Start: 2025-03-07

## 2025-03-07 RX ORDER — INSULIN LISPRO 100 [IU]/ML
6 INJECTION, SOLUTION INTRAVENOUS; SUBCUTANEOUS ONCE
Status: COMPLETED | OUTPATIENT
Start: 2025-03-07 | End: 2025-03-07

## 2025-03-07 RX ORDER — INSULIN LISPRO 100 [IU]/ML
10 INJECTION, SOLUTION INTRAVENOUS; SUBCUTANEOUS ONCE
Status: COMPLETED | OUTPATIENT
Start: 2025-03-07 | End: 2025-03-07

## 2025-03-07 RX ORDER — ACETAMINOPHEN 650 MG/1
650 SUPPOSITORY RECTAL EVERY 4 HOURS PRN
Status: DISCONTINUED | OUTPATIENT
Start: 2025-03-07 | End: 2025-03-09 | Stop reason: SDUPTHER

## 2025-03-07 RX ORDER — VERAPAMIL HYDROCHLORIDE 120 MG/1
120 TABLET, FILM COATED ORAL 3 TIMES DAILY
Status: ACTIVE | OUTPATIENT
Start: 2025-03-07

## 2025-03-07 RX ORDER — ACETAMINOPHEN 325 MG/1
650 TABLET ORAL EVERY 4 HOURS PRN
Status: DISCONTINUED | OUTPATIENT
Start: 2025-03-07 | End: 2025-03-09 | Stop reason: SDUPTHER

## 2025-03-07 RX ORDER — GUAIFENESIN 600 MG/1
600 TABLET, EXTENDED RELEASE ORAL 2 TIMES DAILY
Status: DISPENSED | OUTPATIENT
Start: 2025-03-07

## 2025-03-07 RX ORDER — ROSUVASTATIN CALCIUM 20 MG/1
40 TABLET, COATED ORAL NIGHTLY
Status: DISPENSED | OUTPATIENT
Start: 2025-03-07

## 2025-03-07 RX ORDER — DEXTROSE 50 % IN WATER (D50W) INTRAVENOUS SYRINGE
12.5
Status: ACTIVE | OUTPATIENT
Start: 2025-03-07

## 2025-03-07 RX ORDER — DEXTROSE 50 % IN WATER (D50W) INTRAVENOUS SYRINGE
25
Status: ACTIVE | OUTPATIENT
Start: 2025-03-07

## 2025-03-07 RX ORDER — ZOLPIDEM TARTRATE 5 MG/1
5 TABLET ORAL NIGHTLY PRN
Status: DISCONTINUED | OUTPATIENT
Start: 2025-03-07 | End: 2025-03-07

## 2025-03-07 RX ORDER — GABAPENTIN 300 MG/1
300 CAPSULE ORAL 2 TIMES DAILY
Status: DISPENSED | OUTPATIENT
Start: 2025-03-07

## 2025-03-07 RX ORDER — LOSARTAN POTASSIUM 25 MG/1
100 TABLET ORAL DAILY
Status: DISPENSED | OUTPATIENT
Start: 2025-03-07

## 2025-03-07 RX ORDER — DEXAMETHASONE SODIUM PHOSPHATE 10 MG/ML
10 INJECTION INTRAMUSCULAR; INTRAVENOUS EVERY 8 HOURS
Status: DISCONTINUED | OUTPATIENT
Start: 2025-03-07 | End: 2025-03-07 | Stop reason: DRUGHIGH

## 2025-03-07 RX ORDER — INSULIN LISPRO 100 [IU]/ML
0-5 INJECTION, SOLUTION INTRAVENOUS; SUBCUTANEOUS EVERY 4 HOURS
Status: DISCONTINUED | OUTPATIENT
Start: 2025-03-07 | End: 2025-03-09

## 2025-03-07 RX ORDER — ALBUTEROL SULFATE 0.83 MG/ML
2.5 SOLUTION RESPIRATORY (INHALATION) EVERY 6 HOURS PRN
Status: DISCONTINUED | OUTPATIENT
Start: 2025-03-07 | End: 2025-03-07

## 2025-03-07 RX ORDER — BISMUTH SUBSALICYLATE 262 MG
1 TABLET,CHEWABLE ORAL DAILY
Status: ON HOLD | COMMUNITY

## 2025-03-07 RX ORDER — INSULIN GLARGINE 100 [IU]/ML
30 INJECTION, SOLUTION SUBCUTANEOUS NIGHTLY
Status: DISCONTINUED | OUTPATIENT
Start: 2025-03-07 | End: 2025-03-08

## 2025-03-07 RX ORDER — FLUTICASONE FUROATE AND VILANTEROL 200; 25 UG/1; UG/1
1 POWDER RESPIRATORY (INHALATION)
Status: DISPENSED | OUTPATIENT
Start: 2025-03-07

## 2025-03-07 RX ORDER — BENZONATATE 100 MG/1
200 CAPSULE ORAL 3 TIMES DAILY PRN
Status: DISPENSED | OUTPATIENT
Start: 2025-03-07

## 2025-03-07 RX ORDER — ACETAMINOPHEN 160 MG/5ML
650 SOLUTION ORAL EVERY 4 HOURS PRN
Status: DISCONTINUED | OUTPATIENT
Start: 2025-03-07 | End: 2025-03-09 | Stop reason: SDUPTHER

## 2025-03-07 RX ORDER — MONTELUKAST SODIUM 10 MG/1
10 TABLET ORAL NIGHTLY
Status: DISPENSED | OUTPATIENT
Start: 2025-03-07

## 2025-03-07 RX ORDER — ACETAMINOPHEN 500 MG
10 TABLET ORAL NIGHTLY PRN
Status: DISPENSED | OUTPATIENT
Start: 2025-03-07

## 2025-03-07 RX ORDER — ASPIRIN 81 MG/1
81 TABLET ORAL DAILY
Status: ON HOLD | COMMUNITY

## 2025-03-07 RX ORDER — POLYETHYLENE GLYCOL 3350 17 G/17G
17 POWDER, FOR SOLUTION ORAL DAILY PRN
Status: ACTIVE | OUTPATIENT
Start: 2025-03-07

## 2025-03-07 RX ADMIN — GUAIFENESIN 600 MG: 600 TABLET ORAL at 02:04

## 2025-03-07 RX ADMIN — ACETAMINOPHEN 650 MG: 325 TABLET ORAL at 16:48

## 2025-03-07 RX ADMIN — INSULIN LISPRO 3 UNITS: 100 INJECTION, SOLUTION INTRAVENOUS; SUBCUTANEOUS at 12:32

## 2025-03-07 RX ADMIN — GABAPENTIN 400 MG: 300 CAPSULE ORAL at 02:02

## 2025-03-07 RX ADMIN — GUAIFENESIN 600 MG: 600 TABLET ORAL at 09:37

## 2025-03-07 RX ADMIN — INSULIN LISPRO 3 UNITS: 100 INJECTION, SOLUTION INTRAVENOUS; SUBCUTANEOUS at 02:06

## 2025-03-07 RX ADMIN — INSULIN GLARGINE 30 UNITS: 100 INJECTION, SOLUTION SUBCUTANEOUS at 20:56

## 2025-03-07 RX ADMIN — GABAPENTIN 300 MG: 300 CAPSULE ORAL at 20:27

## 2025-03-07 RX ADMIN — BENZONATATE 200 MG: 100 CAPSULE ORAL at 02:03

## 2025-03-07 RX ADMIN — ROSUVASTATIN CALCIUM 40 MG: 20 TABLET, FILM COATED ORAL at 20:27

## 2025-03-07 RX ADMIN — INSULIN LISPRO 4 UNITS: 100 INJECTION, SOLUTION INTRAVENOUS; SUBCUTANEOUS at 09:37

## 2025-03-07 RX ADMIN — INSULIN GLARGINE 30 UNITS: 100 INJECTION, SOLUTION SUBCUTANEOUS at 02:06

## 2025-03-07 RX ADMIN — GUAIFENESIN 600 MG: 600 TABLET ORAL at 20:27

## 2025-03-07 RX ADMIN — MONTELUKAST 10 MG: 10 TABLET, FILM COATED ORAL at 20:27

## 2025-03-07 RX ADMIN — ALBUTEROL SULFATE 2.5 MG: 2.5 SOLUTION RESPIRATORY (INHALATION) at 20:42

## 2025-03-07 RX ADMIN — ASPIRIN 81 MG: 81 TABLET, COATED ORAL at 16:44

## 2025-03-07 RX ADMIN — INSULIN HUMAN 10 UNITS: 100 INJECTION, SUSPENSION SUBCUTANEOUS at 20:54

## 2025-03-07 RX ADMIN — DEXAMETHASONE SODIUM PHOSPHATE 10 MG: 4 INJECTION, SOLUTION INTRA-ARTICULAR; INTRALESIONAL; INTRAMUSCULAR; INTRAVENOUS; SOFT TISSUE at 20:53

## 2025-03-07 RX ADMIN — INSULIN LISPRO 10 UNITS: 100 INJECTION, SOLUTION INTRAVENOUS; SUBCUTANEOUS at 09:38

## 2025-03-07 RX ADMIN — INSULIN LISPRO 2 UNITS: 100 INJECTION, SOLUTION INTRAVENOUS; SUBCUTANEOUS at 16:44

## 2025-03-07 RX ADMIN — INSULIN LISPRO 6 UNITS: 100 INJECTION, SOLUTION INTRAVENOUS; SUBCUTANEOUS at 21:13

## 2025-03-07 SDOH — SOCIAL STABILITY: SOCIAL INSECURITY: ARE YOU OR HAVE YOU BEEN THREATENED OR ABUSED PHYSICALLY, EMOTIONALLY, OR SEXUALLY BY ANYONE?: NO

## 2025-03-07 SDOH — SOCIAL STABILITY: SOCIAL INSECURITY: ABUSE: ADULT

## 2025-03-07 SDOH — SOCIAL STABILITY: SOCIAL INSECURITY: DO YOU FEEL UNSAFE GOING BACK TO THE PLACE WHERE YOU ARE LIVING?: NO

## 2025-03-07 SDOH — SOCIAL STABILITY: SOCIAL INSECURITY: DOES ANYONE TRY TO KEEP YOU FROM HAVING/CONTACTING OTHER FRIENDS OR DOING THINGS OUTSIDE YOUR HOME?: NO

## 2025-03-07 SDOH — SOCIAL STABILITY: SOCIAL INSECURITY: HAVE YOU HAD ANY THOUGHTS OF HARMING ANYONE ELSE?: NO

## 2025-03-07 SDOH — SOCIAL STABILITY: SOCIAL INSECURITY: ARE THERE ANY APPARENT SIGNS OF INJURIES/BEHAVIORS THAT COULD BE RELATED TO ABUSE/NEGLECT?: NO

## 2025-03-07 SDOH — SOCIAL STABILITY: SOCIAL INSECURITY: DO YOU FEEL ANYONE HAS EXPLOITED OR TAKEN ADVANTAGE OF YOU FINANCIALLY OR OF YOUR PERSONAL PROPERTY?: NO

## 2025-03-07 SDOH — SOCIAL STABILITY: SOCIAL INSECURITY: HAS ANYONE EVER THREATENED TO HURT YOUR FAMILY OR YOUR PETS?: NO

## 2025-03-07 SDOH — SOCIAL STABILITY: SOCIAL INSECURITY: WERE YOU ABLE TO COMPLETE ALL THE BEHAVIORAL HEALTH SCREENINGS?: YES

## 2025-03-07 SDOH — SOCIAL STABILITY: SOCIAL INSECURITY: HAVE YOU HAD THOUGHTS OF HARMING ANYONE ELSE?: NO

## 2025-03-07 ASSESSMENT — ACTIVITIES OF DAILY LIVING (ADL)
HEARING - LEFT EAR: FUNCTIONAL
JUDGMENT_ADEQUATE_SAFELY_COMPLETE_DAILY_ACTIVITIES: YES
ASSISTIVE_DEVICE: EYEGLASSES
FEEDING YOURSELF: INDEPENDENT
ADEQUATE_TO_COMPLETE_ADL: YES
DRESSING YOURSELF: INDEPENDENT
GROOMING: INDEPENDENT
PATIENT'S MEMORY ADEQUATE TO SAFELY COMPLETE DAILY ACTIVITIES?: YES
BATHING: NEEDS ASSISTANCE
WALKS IN HOME: UNABLE TO ASSESS
HEARING - RIGHT EAR: FUNCTIONAL
TOILETING: NEEDS ASSISTANCE

## 2025-03-07 ASSESSMENT — PAIN - FUNCTIONAL ASSESSMENT
PAIN_FUNCTIONAL_ASSESSMENT: 0-10

## 2025-03-07 ASSESSMENT — COLUMBIA-SUICIDE SEVERITY RATING SCALE - C-SSRS
6. HAVE YOU EVER DONE ANYTHING, STARTED TO DO ANYTHING, OR PREPARED TO DO ANYTHING TO END YOUR LIFE?: NO
2. HAVE YOU ACTUALLY HAD ANY THOUGHTS OF KILLING YOURSELF?: NO
1. IN THE PAST MONTH, HAVE YOU WISHED YOU WERE DEAD OR WISHED YOU COULD GO TO SLEEP AND NOT WAKE UP?: NO

## 2025-03-07 ASSESSMENT — COGNITIVE AND FUNCTIONAL STATUS - GENERAL
MOVING FROM LYING ON BACK TO SITTING ON SIDE OF FLAT BED WITH BEDRAILS: A LITTLE
STANDING UP FROM CHAIR USING ARMS: A LOT
HELP NEEDED FOR BATHING: A LOT
DAILY ACTIVITIY SCORE: 16
WALKING IN HOSPITAL ROOM: TOTAL
WALKING IN HOSPITAL ROOM: TOTAL
TURNING FROM BACK TO SIDE WHILE IN FLAT BAD: A LOT
DAILY ACTIVITIY SCORE: 19
PATIENT BASELINE BEDBOUND: NO
DRESSING REGULAR LOWER BODY CLOTHING: A LOT
CLIMB 3 TO 5 STEPS WITH RAILING: TOTAL
CLIMB 3 TO 5 STEPS WITH RAILING: TOTAL
TOILETING: TOTAL
MOVING TO AND FROM BED TO CHAIR: TOTAL
TOILETING: A LITTLE
TURNING FROM BACK TO SIDE WHILE IN FLAT BAD: TOTAL
HELP NEEDED FOR BATHING: A LOT
MOVING FROM LYING ON BACK TO SITTING ON SIDE OF FLAT BED WITH BEDRAILS: A LOT
MOBILITY SCORE: 9
DRESSING REGULAR LOWER BODY CLOTHING: A LOT
DRESSING REGULAR UPPER BODY CLOTHING: A LITTLE
MOBILITY SCORE: 9
STANDING UP FROM CHAIR USING ARMS: A LOT
MOVING TO AND FROM BED TO CHAIR: TOTAL

## 2025-03-07 ASSESSMENT — ENCOUNTER SYMPTOMS
FLANK PAIN: 0
SHORTNESS OF BREATH: 0
FREQUENCY: 0
DYSURIA: 0
ABDOMINAL PAIN: 0
HEMATURIA: 0
CHILLS: 0
FEVER: 0
CONSTIPATION: 0
WEAKNESS: 1
DIARRHEA: 0
VOMITING: 0
PALPITATIONS: 0
NAUSEA: 0
COUGH: 1

## 2025-03-07 ASSESSMENT — PAIN DESCRIPTION - ORIENTATION: ORIENTATION: RIGHT;LEFT

## 2025-03-07 ASSESSMENT — PAIN SCALES - GENERAL
PAINLEVEL_OUTOF10: 0 - NO PAIN
PAINLEVEL_OUTOF10: 3
PAINLEVEL_OUTOF10: 3
PAINLEVEL_OUTOF10: 0 - NO PAIN

## 2025-03-07 ASSESSMENT — LIFESTYLE VARIABLES
AUDIT-C TOTAL SCORE: 0
AUDIT-C TOTAL SCORE: 0
HOW MANY STANDARD DRINKS CONTAINING ALCOHOL DO YOU HAVE ON A TYPICAL DAY: PATIENT DOES NOT DRINK
SKIP TO QUESTIONS 9-10: 1
HOW OFTEN DO YOU HAVE 6 OR MORE DRINKS ON ONE OCCASION: NEVER
HOW OFTEN DO YOU HAVE A DRINK CONTAINING ALCOHOL: NEVER

## 2025-03-07 ASSESSMENT — PATIENT HEALTH QUESTIONNAIRE - PHQ9
2. FEELING DOWN, DEPRESSED OR HOPELESS: NOT AT ALL
1. LITTLE INTEREST OR PLEASURE IN DOING THINGS: NOT AT ALL
SUM OF ALL RESPONSES TO PHQ9 QUESTIONS 1 & 2: 0

## 2025-03-07 ASSESSMENT — PAIN DESCRIPTION - LOCATION: LOCATION: SHOULDER

## 2025-03-07 NOTE — CONSULTS
[facility] Department of Orthopaedic Surgery   Initial Consult Note  03/07/25    HPI:   Orthopaedic Problems/Injuries: c/f Cauda Equina Syndrome  Other Injuries: none    69F ([facility] (2019), T2DM, BMI 35, HTN) p/a transfer from [place] w/ BLE weakness & urinary incontinence for 6 months.  Patient states she has had issues with bowel and bladder incontinence for well over 6 months.  She does also have a uterine prolapse she feels may be contributing to some of her symptoms.  However in the last 3 days she developed progressively worsening weakness of her bilateral lower extremities after a fall presented to an outside ED after she was unable to ambulate.  She does complain of bilateral lower extremity radicular pain, saddle anesthesia gait instability.  At baseline she lives at home alone and is independent with most ADLs with assistance from her son's.  She ambulates with a walker at baseline.       ROS  12-point review of systems is negative other than what is mentioned above.    Physical Exam:  Gen: AOx3, NAD  HEENT: normocephalic atraumatic  Psych: appropriate mood and affect  Resp: nonlabored breathing  Cardiac: Extremities WWP, RRR to peripheral palpation  Neuro: CN 2-12 grossly intact  Skin: no rashes  MSK:   SPINE EXAM:  - No tenderness to palpation of C/T/L spine. No step-offs or deformities.     C5: SILT   Deltoid 5/5 Left; 5/5 Right  C6: SILT   Wrist Ext: 5/5 Left; 5/5 Right  C7: SILT   Triceps: 5/5 Left; 5/5 Right  C8: SILT   Finger flexion: 5/5 Left; 5/5 Right  T1: SILT    Interossei: 5/5 Left; 5/5 Right    Bicep Reflex 2+   Bilaterally  BR Reflex 2+   Bilaterally  Negative cook, negative inverted BR reflex    L1: SILT       L2: SILT      Hip flexors 2/5 Left; 2/5 Right  L3: SILT      Knee extension 2/5 Left; 2/5 Right  L4: SILT      Tib Ant. (Dorsiflexion) 4/5 Left; 4/5 Right  L5: SILT      EHL 5/5 Left; 5/5 Right  S1: SILT      Planter flexion 5/5 Left; 5/5 Right    2-3 beats unsustained  ankle clonus, negative babinski    Perianal sensation intact  Rectal tone decreased    A full secondary exam was performed and all relevant findings discussed and noted above.    Imaging:  CT/MRI w/ chronic appearing T8, T9 compression fx, bridging osteophytes, moderate canal stenosis at T9-10, severe stenosis at T10-11, L2-3 w/ myelomalacia.    Assessment:  Orthopaedic Problems/Injuries: c/f Cauda Equina Syndrome    69F ([facility] (2019), T2DM, BMI 35, HTN) p/a transfer from [place] w/ BLE weakness & urinary incontinence for 6 months. Worsening last 3d now unable to ambulate. On exam, 5/5 strength BUE, BLE HF 2/5, KE 2/5, DF 4/5, PF/EHL 5/5, no UMNs, rectal tone decreased, sensation intact. CT/MRI w/ chronic appearing T8, T9 compression fx, bridging osteophytes, moderate canal stenosis at T9-10, severe stenosis at T10-11, L2-3 w/ myelomalacia. Posted for T9-11 Laminectomy w/ Dr. Murguia 3/11. Clearance pending Medicine.       Plan:  - Dispo: Medicine Primary  - OR: Posted for T9-11 Laminectomy w/ Dr. Murguia 3/7   - Will need consent  - Appreciate documentation of clearance for OR per primary when medically appropriate   - WB: OOBAT, no restrictions  - Abx: Periop Ancef  - Diet: NPO for OR today  - Please obtain all preop labs (CBC,BMP,EKG, CXR, Coags, Type and Screen)  - Kelley: recommend placement     This patient was seen within 30 minutes of initial consult and staffed with attending physician Dr. Murguia.     This patient will be followed by the ORTHO SPINE service while in-house. Please contact the following team members for any additional questions/concerns.     Ortho Spine  John Colunga MD PGY2  Alix Diaz MD PGY4     Please page [phone] (ortho on-call) after 6pm and on weekends.  _________________________________________________________________________________    Fercho Downing MD PGY2  Orthopaedic Surgery  On-call Resident  Epic Chat Preferred

## 2025-03-07 NOTE — H&P
History Of Present Illness  Abbie Mayo is a 69 y.o. female with a past medical history of poorly controlled T2DM (last a1c 11 3/2025), HTN, and asthma who presented to [facility] from [place] for evaluation of suspected cauda equina syndrome. She originally presented to the OSH with worsening BLE weakness and urinary incontinence over the past 3 years, with an acute worsening 3 days prior to arrival after a fall. Imaging showed disc changes and compression fracture of T8 and T9. Neurosurgery at [place] recommended transfer to [facility] as they are unable to consult or perform surgery at this time per EMR review. She arrived to [facility] without incident. She endorses a cough, BLE weakness and pain. She denies any fever, chills, shortness of breath, chest pain, nausea, or vomiting.      Past Medical History  Past Medical History:   Diagnosis Date    Abnormal ECG     9/9/24--Normal sinus rhythm Cannot rule out Inferior infarct , age undetermined Cannot rule out Anterior infarct , age undetermined Abnormal ECG    Anxiety     Asthma     Asthma with acute exacerbation ([facility]) 09/16/2024    Complaining of cough     9/4/24-- c/o cough with phelgm    Coronary artery disease     History of chronic ischemic heart disease     Hyperlipidemia     Hypertension     Lumbar radiculopathy     Myocardial infarction (Multi)     Several years ago    Pseudoclaudication syndrome     Shortness of breath     Spinal stenosis     spinal stenosis in the thoracic spine    Type 2 diabetes mellitus     Poorly controlled       Surgical History  Past Surgical History:   Procedure Laterality Date    CARDIAC CATHETERIZATION      CATARACT EXTRACTION      CHOLECYSTECTOMY      COLONOSCOPY      IRIDOTOMY / IRIDECTOMY      TRABECULECTOMY        Social History  She reports that she has never smoked. She has never used smokeless tobacco. She reports that she does not drink alcohol and does not use drugs.    Family History  No family history on file.      Allergies  Suvorexant    Review of Systems   Constitutional:  Negative for chills and fever.   Respiratory:  Positive for cough. Negative for shortness of breath.    Cardiovascular:  Negative for chest pain and palpitations.   Gastrointestinal:  Negative for abdominal pain, constipation, diarrhea, nausea and vomiting.   Genitourinary:  Negative for dysuria, flank pain, frequency, hematuria and urgency.   Neurological:  Positive for weakness.   All other systems reviewed and are negative.       Physical Exam  Vitals reviewed.   Constitutional:       Appearance: She is obese.   HENT:      Head: Normocephalic and atraumatic.   Cardiovascular:      Rate and Rhythm: Normal rate and regular rhythm.      Heart sounds: Normal heart sounds.   Pulmonary:      Effort: Pulmonary effort is normal. No respiratory distress.      Breath sounds: Normal air entry.   Abdominal:      General: Bowel sounds are normal.      Palpations: Abdomen is soft.      Tenderness: There is no abdominal tenderness.   Musculoskeletal:         General: No deformity.   Skin:     General: Skin is warm and dry.   Neurological:      General: No focal deficit present.      Mental Status: She is alert and oriented to person, place, and time.      Motor: Weakness present.      Comments: 2/5 BLE strength   Psychiatric:         Mood and Affect: Mood normal.         Behavior: Behavior normal.          Last Recorded Vitals  There were no vitals taken for this visit.    Relevant Results           Assessment/Plan   Assessment & Plan  Cauda equina compression (Multi)      #BLE weakness  #Suspected cauda equina syndrome  -Falls precautions  -NPO MN  -Consult ortho/spine  -Will hold off on DVT chemoppx until seen by ortho/spine    #T2DM  -Poorly controlled  -Hold metformin  -Continue Lantus at 30 units (48 units home dose)  -Low SSI q4H  -Hypoglycemic protocol    #HTN  -Continue home meds    #Asthma  -No acute issues  -Albuterol PRN             Riky Esquivel,  APRN-CNP

## 2025-03-07 NOTE — SIGNIFICANT EVENT
Abbie Mayo is a 69 y.o. female with a past medical history of poorly controlled T2DM (last a1c 11 3/2025), HTN, and asthma who presented to [facility] from [place] for evaluation of suspected cauda equina syndrome       Surgical Risk Assessment:  1) Patient doesn't have contraindications to elective surgery   2) Surgery-Specific Cardiac Risk: Low risk (<1% )   3) Patient-Specific Cardiac Risk:  RCRI 1 point. 6% risk of MACE risk factors include: Pre-operative treatment with insulin. This corresponds to a low risk of major perioperative cardiovascular events (MI, pulmonary edema, ventricular fibrillation or primary cardiac arrest, and complete heart block)).  4) The 2014 ACC/AHA algorithm was followed and Labs and imaging studies have been considered as indicated by history and physical.  5)  Ariscat: 3 points 1.6% risk for in-hospital post-operative pulmonary complications [composite including respiratory failure, respiratory infection, pleural effusion, atelectasis, pneumothorax, bronchospasm, aspiration pneumonitis]      These risks above do not preclude patient from a medically necessarily surgical intervention.    Lung expansion maneuvers:   -please ensure use of incentives spirometry and deep breathing exercises prior to surgery performed at least 10x every hour while awake  Bronchodilator therapy: duoneb INH q4h while awake; will benefit from PRN bronchodilator post op   Early Mobilization with aggressive PT/OT     The procedure, alternative treatments, risks, and benefits were explained to the patient. The risk of MACE in the perioperative period was explained to the patient.  This patient is low risk for a low to intermediate risk procedure.

## 2025-03-07 NOTE — CARE PLAN
Problem: Pain - Adult  Goal: Verbalizes/displays adequate comfort level or baseline comfort level  3/7/2025 1749 by Delia Harris RN  Outcome: Progressing  3/7/2025 1749 by Delia Harris RN  Outcome: Progressing     Problem: Safety - Adult  Goal: Free from fall injury  3/7/2025 1749 by Delia Harris RN  Outcome: Progressing  3/7/2025 1749 by Delia Harris RN  Outcome: Progressing     Problem: Chronic Conditions and Co-morbidities  Goal: Patient's chronic conditions and co-morbidity symptoms are monitored and maintained or improved  3/7/2025 1749 by Delia Harris RN  Outcome: Progressing  3/7/2025 1749 by Delia Harris RN  Outcome: Progressing     Problem: Skin  Goal: Prevent/manage excess moisture  Outcome: Progressing        The clinical goals for the shift include Patient will remain free of injuries or falls throughout shift

## 2025-03-07 NOTE — PROGRESS NOTES
Pharmacy Medication History Review    Abbie Mayo is a 69 y.o. female admitted for Cauda equina compression (Multi). Pharmacy reviewed the patient's ovwur-sl-bowrqnotg medications and allergies for accuracy.    Medications ADDED:  Aspirin  Medications CHANGED:  Ambien nightly to as needed  Medications REMOVED:   Tylenol with codeine  Xanax  Zithromax  Combigan- pt state she only uses 1 eye drop  Ferrous sulfate  Breo  Zofran  Amaryl  Norco  Lyrica  travoprost    The list below reflects the updated PTA list.   Prior to Admission Medications   Prescriptions Last Dose Informant   HumaLOG KwikPen Insulin 100 unit/mL injection  Self   Sig: Inject 20 Units under the skin 3 times daily (morning, midday, late afternoon). Take with meals. Take 27 units before meals while you are on steroids and then go back to taking 20 units before meals once you are off the steroids.   Lantus Solostar U-100 Insulin 100 unit/mL (3 mL) pen  Self   Sig: Inject 48 Units under the skin once daily at bedtime.   OneTouch Verio test strips strip  Self   Sig: USE TO CHECK BLOOD SUGAR 3 TIMES DAILY   Ozempic 0.25 mg or 0.5 mg (2 mg/3 mL) pen injector  Self   Sig: Inject 0.5 mg under the skin 1 (one) time per week. Every Sunday   albuterol 90 mcg/actuation inhaler  Self   Sig: Inhale 2 puffs every 4 hours as needed for shortness of breath. You should use your inhaler every 4 hours while you are awake for the next 3 days, until your breathing is better after your asthma flare.  Pt states that she ran out and would like a refill    aspirin 81 mg EC tablet  Self   Sig: Take 1 tablet (81 mg) by mouth once daily.   blood pressure monitor (Blood Pressure Kit) kit  Self   Sig: Use as directed to check blood pressure twice daily.   cyanocobalamin (Vitamin B-12) 1,000 mcg tablet  Self   Sig: Take 1 tablet every day by oral route in the morning.   diclofenac (Voltaren) 50 mg EC tablet  Self   Sig: Take 1 tablet (50 mg) by mouth 2 times a day as needed.  No  fill history     ergocalciferol (Vitamin D-2) 1.25 MG (35202 UT) capsule  Self   Sig: Take 1 capsule (1,250 mcg) by mouth 1 (one) time per week. Every Monday  Last filled 10/9 (90 day supply)    gabapentin (Neurontin) 300 mg capsule  Self   Sig: Take 1 capsule (300 mg) by mouth 2 times a day.   guaiFENesin (Mucinex) 600 mg 12 hr tablet  Self   Sig: Take 1 tablet (600 mg) by mouth 2 times a day as needed for cough. Do not crush, chew, or split.   Patient not taking: Reported on 9/25/2024   hydroCHLOROthiazide (HYDRODiuril) 25 mg tablet  Self   Sig: Take 1 tablet (25 mg) by mouth once daily.  Last filled 8/11 (90 day supply)    ipratropium-albuteroL (Duo-Neb) 0.5-2.5 mg/3 mL nebulizer solution  Self   Sig: Take 3 mL by nebulization every 6 hours if needed for wheezing or shortness of breath.   latanoprost (Xalatan) 0.005 % ophthalmic solution  Self   Sig: Administer 1 drop into the right eye 2 times a day.   losartan (Cozaar) 50 mg tablet  Self   Sig: Take 1 tablet (50 mg) by mouth once daily.   metFORMIN (Glucophage) 500 mg tablet  Self   Sig: Take 0.5 tablets (250 mg) by mouth 2 times daily (morning and late afternoon).   Pt states she takes 1/2 tablet by mouth once daily when she remembers- last filled 8/11 (90 day supply)    metoprolol tartrate (Lopressor) 50 mg tablet  Self   Sig: Take 1.5 tablets by mouth 2 times a day.   Last filled 11/22 for 90 day supply    mometasone-formoterol (Dulera 100) 100-5 mcg/actuation inhaler  Self   Sig: Inhale 2 puffs 2 times a day.   montelukast (Singulair) 10 mg tablet  Self   Sig: Take 1 tablet (10 mg) by mouth once daily at bedtime.  Ran out- would like refill-- last filled 11/22   multivitamin tablet  Self   Sig: Take 1 tablet by mouth once daily.   pantoprazole (ProtoNix) 40 mg EC tablet  Self   Sig: Take 1 tablet (40 mg) by mouth once daily in the morning. Take before meals. Do not crush, chew, or split.   Patient not taking: Reported on 9/25/2024   rosuvastatin (Crestor)  "40 mg tablet  Self   Sig: Take 1 tablet (40 mg) by mouth once daily at bedtime.   semaglutide 0.25 mg or 0.5 mg (2 mg/3 mL) pen injector Not Taking Self   Sig: Inject 0.5 mg under the skin 1 (one) time per week.   Patient not taking: Reported on 3/7/2025  Stopped taking in September for surgery and was not sure when to start taking again     verapamil (Calan) 120 mg tablet  Self   Sig: Take 1 tablet (120 mg) by mouth 3 times a day.   white petrolatum-mineral oiL (Tears Naturale PM) 94-3 % ophthalmic ointment  Self   Sig: Apply 1 Application to both eyes if needed for dry eyes.   zolpidem (Ambien) 10 mg tablet  Self   Sig: Take 1 tablet (10 mg) by mouth as needed at bedtime for sleep.      Facility-Administered Medications: None        The list below reflects the updated allergy list. Please review each documented allergy for additional clarification and justification.  Allergies  Reviewed by Keara Perez PharmD on 3/7/2025        Severity Reactions Comments    Suvorexant Medium Other Not sure            Patient accepts M2B at discharge.     Sources:   [facility]  Pharmacy dispense history  Inconsistent fill history for many medications- please review med list above  Patient interview - required significant prompting  Pt had a medication from [facility] from November of 2024  Pt able to provide minimal details of medications  Chart Review  Discharge summary from 9/13  Pharmacy telemedicine note from 1/3  Care Everywhere  [place] pulmonary note from 11/21    Additional Comments:  Please review comments above within med list      Keara Perez PharmD  Transitions of Care Pharmacist  03/07/25     Secure Chat preferred   If no response call [phone] or [employer] \"Med Rec\"      "

## 2025-03-07 NOTE — NURSING NOTE
"Diabetes ed visit today based on DM diagnosis with elevated A1c.  Ms. Mayo states that she has had DM \"for a while\".  She takes Lantus insulin daily and Metformin tabs at home.  States she does not always take the metformin \"because I heard it could be bad for you\"  Clarified with her how the med works.  She does not monitor BG daily -- encouraged her to do so.    She is aware that illness and some meds \"like steroids\" raise the BG.  She is aware of insulin while in hospital.  She is to have spine surgery this evening.  Will follow as needed while inpatient.  Ms. Mayo is agreeable to this plan.  Time spent:  15 mins.  "

## 2025-03-07 NOTE — PROGRESS NOTES
Abbie Mayo is a 69 y.o. female on day 0 of admission presenting with Cauda equina compression (Multi).      TCC attempt to meet with patient and Introduced myself as care coordinator and member of the Care Transitions team for discharge planning.  Patient not available/off the unit will assess the patient at a later time.    Transitional Care Coordination Progress Note:  Patient discussed during interdisciplinary rounds.   Team members present: Md & TCC  Plan per Medical/Surgical team: [facility] for spine surgery  Discharge disposition: TBD  Potential Barriers: None  ADOD: 3/10    MARIANA BeaverN-RN  Transitional Care Coordinator (TCC)  [phone] ext [phone]

## 2025-03-08 ENCOUNTER — APPOINTMENT (OUTPATIENT)
Dept: RADIOLOGY | Facility: HOSPITAL | Age: 70
DRG: 029 | End: 2025-03-08
Payer: MEDICARE

## 2025-03-08 ENCOUNTER — HOSPITAL ENCOUNTER (INPATIENT)
Dept: NEUROLOGY | Facility: HOSPITAL | Age: 70
Discharge: HOME | DRG: 029 | End: 2025-03-08
Payer: MEDICARE

## 2025-03-08 LAB
ALBUMIN SERPL BCP-MCNC: 4.1 G/DL (ref 3.4–5)
ANION GAP BLDA CALCULATED.4IONS-SCNC: 12 MMO/L (ref 10–25)
ANION GAP BLDA CALCULATED.4IONS-SCNC: 12 MMO/L (ref 10–25)
ANION GAP BLDA CALCULATED.4IONS-SCNC: 13 MMO/L (ref 10–25)
ANION GAP SERPL CALC-SCNC: 16 MMOL/L (ref 10–20)
APTT PPP: 26 SECONDS (ref 26–36)
BASE EXCESS BLDA CALC-SCNC: -1.2 MMOL/L (ref -2–3)
BASE EXCESS BLDA CALC-SCNC: -1.3 MMOL/L (ref -2–3)
BASE EXCESS BLDA CALC-SCNC: -2 MMOL/L (ref -2–3)
BODY TEMPERATURE: 37 DEGREES CELSIUS
BUN SERPL-MCNC: 17 MG/DL (ref 6–23)
CA-I BLDA-SCNC: 1.18 MMOL/L (ref 1.1–1.33)
CA-I BLDA-SCNC: 1.23 MMOL/L (ref 1.1–1.33)
CA-I BLDA-SCNC: 1.24 MMOL/L (ref 1.1–1.33)
CALCIUM SERPL-MCNC: 9.2 MG/DL (ref 8.6–10.6)
CHLORIDE BLDA-SCNC: 105 MMOL/L (ref 98–107)
CHLORIDE BLDA-SCNC: 106 MMOL/L (ref 98–107)
CHLORIDE BLDA-SCNC: 106 MMOL/L (ref 98–107)
CHLORIDE SERPL-SCNC: 104 MMOL/L (ref 98–107)
CO2 SERPL-SCNC: 24 MMOL/L (ref 21–32)
CREAT SERPL-MCNC: 0.76 MG/DL (ref 0.5–1.05)
EGFRCR SERPLBLD CKD-EPI 2021: 85 ML/MIN/1.73M*2
ERYTHROCYTE [DISTWIDTH] IN BLOOD BY AUTOMATED COUNT: 13.2 % (ref 11.5–14.5)
GLUCOSE BLD MANUAL STRIP-MCNC: 232 MG/DL (ref 74–99)
GLUCOSE BLD MANUAL STRIP-MCNC: 248 MG/DL (ref 74–99)
GLUCOSE BLD MANUAL STRIP-MCNC: 288 MG/DL (ref 74–99)
GLUCOSE BLD MANUAL STRIP-MCNC: 294 MG/DL (ref 74–99)
GLUCOSE BLD MANUAL STRIP-MCNC: 302 MG/DL (ref 74–99)
GLUCOSE BLDA-MCNC: 190 MG/DL (ref 74–99)
GLUCOSE BLDA-MCNC: 201 MG/DL (ref 74–99)
GLUCOSE BLDA-MCNC: 218 MG/DL (ref 74–99)
GLUCOSE SERPL-MCNC: 305 MG/DL (ref 74–99)
HCO3 BLDA-SCNC: 23.1 MMOL/L (ref 22–26)
HCO3 BLDA-SCNC: 23.7 MMOL/L (ref 22–26)
HCO3 BLDA-SCNC: 24.3 MMOL/L (ref 22–26)
HCT VFR BLD AUTO: 39 % (ref 36–46)
HCT VFR BLD EST: 33 % (ref 36–46)
HCT VFR BLD EST: 36 % (ref 36–46)
HCT VFR BLD EST: 37 % (ref 36–46)
HGB BLD-MCNC: 12.5 G/DL (ref 12–16)
HGB BLDA-MCNC: 11.1 G/DL (ref 12–16)
HGB BLDA-MCNC: 11.9 G/DL (ref 12–16)
HGB BLDA-MCNC: 12.2 G/DL (ref 12–16)
INR PPP: 1.1 (ref 0.9–1.1)
LACTATE BLDA-SCNC: 1.2 MMOL/L (ref 0.4–2)
LACTATE BLDA-SCNC: 1.3 MMOL/L (ref 0.4–2)
LACTATE BLDA-SCNC: 1.4 MMOL/L (ref 0.4–2)
MAGNESIUM SERPL-MCNC: 1.9 MG/DL (ref 1.6–2.4)
MCH RBC QN AUTO: 26.4 PG (ref 26–34)
MCHC RBC AUTO-ENTMCNC: 32.1 G/DL (ref 32–36)
MCV RBC AUTO: 82 FL (ref 80–100)
NRBC BLD-RTO: 0 /100 WBCS (ref 0–0)
OXYHGB MFR BLDA: 97.1 % (ref 94–98)
OXYHGB MFR BLDA: 97.7 % (ref 94–98)
OXYHGB MFR BLDA: 98.2 % (ref 94–98)
PCO2 BLDA: 40 MM HG (ref 38–42)
PCO2 BLDA: 40 MM HG (ref 38–42)
PCO2 BLDA: 43 MM HG (ref 38–42)
PH BLDA: 7.36 PH (ref 7.38–7.42)
PH BLDA: 7.37 PH (ref 7.38–7.42)
PH BLDA: 7.38 PH (ref 7.38–7.42)
PHOSPHATE SERPL-MCNC: 3.5 MG/DL (ref 2.5–4.9)
PLATELET # BLD AUTO: 246 X10*3/UL (ref 150–450)
PO2 BLDA: 153 MM HG (ref 85–95)
PO2 BLDA: 166 MM HG (ref 85–95)
PO2 BLDA: 397 MM HG (ref 85–95)
POTASSIUM BLDA-SCNC: 4 MMOL/L (ref 3.5–5.3)
POTASSIUM BLDA-SCNC: 4.1 MMOL/L (ref 3.5–5.3)
POTASSIUM BLDA-SCNC: 4.3 MMOL/L (ref 3.5–5.3)
POTASSIUM SERPL-SCNC: 4 MMOL/L (ref 3.5–5.3)
PROTHROMBIN TIME: 11.6 SECONDS (ref 9.8–12.4)
RBC # BLD AUTO: 4.74 X10*6/UL (ref 4–5.2)
SAO2 % BLDA: 100 % (ref 94–100)
SAO2 % BLDA: 100 % (ref 94–100)
SAO2 % BLDA: 99 % (ref 94–100)
SODIUM BLDA-SCNC: 137 MMOL/L (ref 136–145)
SODIUM BLDA-SCNC: 138 MMOL/L (ref 136–145)
SODIUM BLDA-SCNC: 138 MMOL/L (ref 136–145)
SODIUM SERPL-SCNC: 140 MMOL/L (ref 136–145)
WBC # BLD AUTO: 8.3 X10*3/UL (ref 4.4–11.3)

## 2025-03-08 PROCEDURE — 2500000001 HC RX 250 WO HCPCS SELF ADMINISTERED DRUGS (ALT 637 FOR MEDICARE OP): Performed by: STUDENT IN AN ORGANIZED HEALTH CARE EDUCATION/TRAINING PROGRAM

## 2025-03-08 PROCEDURE — 00UT0KZ SUPPLEMENT SPINAL MENINGES WITH NONAUTOLOGOUS TISSUE SUBSTITUTE, OPEN APPROACH: ICD-10-PCS | Performed by: STUDENT IN AN ORGANIZED HEALTH CARE EDUCATION/TRAINING PROGRAM

## 2025-03-08 PROCEDURE — 84132 ASSAY OF SERUM POTASSIUM: CPT | Performed by: ANESTHESIOLOGIST ASSISTANT

## 2025-03-08 PROCEDURE — 85027 COMPLETE CBC AUTOMATED: CPT | Performed by: STUDENT IN AN ORGANIZED HEALTH CARE EDUCATION/TRAINING PROGRAM

## 2025-03-08 PROCEDURE — 2780000003 HC OR 278 NO HCPCS: Performed by: STUDENT IN AN ORGANIZED HEALTH CARE EDUCATION/TRAINING PROGRAM

## 2025-03-08 PROCEDURE — 2500000002 HC RX 250 W HCPCS SELF ADMINISTERED DRUGS (ALT 637 FOR MEDICARE OP, ALT 636 FOR OP/ED): Performed by: NURSE PRACTITIONER

## 2025-03-08 PROCEDURE — 80069 RENAL FUNCTION PANEL: CPT | Performed by: STUDENT IN AN ORGANIZED HEALTH CARE EDUCATION/TRAINING PROGRAM

## 2025-03-08 PROCEDURE — 63048 LAM FACETEC &FORAMOT EA ADDL: CPT | Performed by: STUDENT IN AN ORGANIZED HEALTH CARE EDUCATION/TRAINING PROGRAM

## 2025-03-08 PROCEDURE — 36415 COLL VENOUS BLD VENIPUNCTURE: CPT | Performed by: STUDENT IN AN ORGANIZED HEALTH CARE EDUCATION/TRAINING PROGRAM

## 2025-03-08 PROCEDURE — C1713 ANCHOR/SCREW BN/BN,TIS/BN: HCPCS | Performed by: STUDENT IN AN ORGANIZED HEALTH CARE EDUCATION/TRAINING PROGRAM

## 2025-03-08 PROCEDURE — 7100000024 HC EXTENDED STAY RECOVERY PER MINUTE- PACU: Performed by: STUDENT IN AN ORGANIZED HEALTH CARE EDUCATION/TRAINING PROGRAM

## 2025-03-08 PROCEDURE — 7100000001 HC RECOVERY ROOM TIME - INITIAL BASE CHARGE: Performed by: STUDENT IN AN ORGANIZED HEALTH CARE EDUCATION/TRAINING PROGRAM

## 2025-03-08 PROCEDURE — 22610 ARTHRD PST TQ 1NTRSPC THRC: CPT | Performed by: STUDENT IN AN ORGANIZED HEALTH CARE EDUCATION/TRAINING PROGRAM

## 2025-03-08 PROCEDURE — 36620 INSERTION CATHETER ARTERY: CPT | Performed by: ANESTHESIOLOGY

## 2025-03-08 PROCEDURE — 3700000001 HC GENERAL ANESTHESIA TIME - INITIAL BASE CHARGE: Performed by: STUDENT IN AN ORGANIZED HEALTH CARE EDUCATION/TRAINING PROGRAM

## 2025-03-08 PROCEDURE — 22614 ARTHRD PST TQ 1NTRSPC EA ADD: CPT | Performed by: STUDENT IN AN ORGANIZED HEALTH CARE EDUCATION/TRAINING PROGRAM

## 2025-03-08 PROCEDURE — 2500000004 HC RX 250 GENERAL PHARMACY W/ HCPCS (ALT 636 FOR OP/ED): Performed by: ANESTHESIOLOGIST ASSISTANT

## 2025-03-08 PROCEDURE — 85610 PROTHROMBIN TIME: CPT | Performed by: STUDENT IN AN ORGANIZED HEALTH CARE EDUCATION/TRAINING PROGRAM

## 2025-03-08 PROCEDURE — 84132 ASSAY OF SERUM POTASSIUM: CPT

## 2025-03-08 PROCEDURE — 00NX0ZZ RELEASE THORACIC SPINAL CORD, OPEN APPROACH: ICD-10-PCS | Performed by: STUDENT IN AN ORGANIZED HEALTH CARE EDUCATION/TRAINING PROGRAM

## 2025-03-08 PROCEDURE — 2500000004 HC RX 250 GENERAL PHARMACY W/ HCPCS (ALT 636 FOR OP/ED): Performed by: STUDENT IN AN ORGANIZED HEALTH CARE EDUCATION/TRAINING PROGRAM

## 2025-03-08 PROCEDURE — C1763 CONN TISS, NON-HUMAN: HCPCS | Performed by: STUDENT IN AN ORGANIZED HEALTH CARE EDUCATION/TRAINING PROGRAM

## 2025-03-08 PROCEDURE — 2500000002 HC RX 250 W HCPCS SELF ADMINISTERED DRUGS (ALT 637 FOR MEDICARE OP, ALT 636 FOR OP/ED): Performed by: STUDENT IN AN ORGANIZED HEALTH CARE EDUCATION/TRAINING PROGRAM

## 2025-03-08 PROCEDURE — 3700000002 HC GENERAL ANESTHESIA TIME - EACH INCREMENTAL 1 MINUTE: Performed by: STUDENT IN AN ORGANIZED HEALTH CARE EDUCATION/TRAINING PROGRAM

## 2025-03-08 PROCEDURE — 3600000017 HC OR TIME - EACH INCREMENTAL 1 MINUTE - PROCEDURE LEVEL SIX: Performed by: STUDENT IN AN ORGANIZED HEALTH CARE EDUCATION/TRAINING PROGRAM

## 2025-03-08 PROCEDURE — 0RG7071 FUSION OF 2 TO 7 THORACIC VERTEBRAL JOINTS WITH AUTOLOGOUS TISSUE SUBSTITUTE, POSTERIOR APPROACH, POSTERIOR COLUMN, OPEN APPROACH: ICD-10-PCS | Performed by: STUDENT IN AN ORGANIZED HEALTH CARE EDUCATION/TRAINING PROGRAM

## 2025-03-08 PROCEDURE — P9045 ALBUMIN (HUMAN), 5%, 250 ML: HCPCS | Mod: JZ,TB | Performed by: ANESTHESIOLOGIST ASSISTANT

## 2025-03-08 PROCEDURE — 85730 THROMBOPLASTIN TIME PARTIAL: CPT | Performed by: STUDENT IN AN ORGANIZED HEALTH CARE EDUCATION/TRAINING PROGRAM

## 2025-03-08 PROCEDURE — 3600000018 HC OR TIME - INITIAL BASE CHARGE - PROCEDURE LEVEL SIX: Performed by: STUDENT IN AN ORGANIZED HEALTH CARE EDUCATION/TRAINING PROGRAM

## 2025-03-08 PROCEDURE — 2500000004 HC RX 250 GENERAL PHARMACY W/ HCPCS (ALT 636 FOR OP/ED): Mod: JZ,TB | Performed by: ANESTHESIOLOGY

## 2025-03-08 PROCEDURE — 95939 C MOTOR EVOKED UPR&LWR LIMBS: CPT

## 2025-03-08 PROCEDURE — 63046 LAM FACETEC & FORAMOT THRC: CPT | Performed by: STUDENT IN AN ORGANIZED HEALTH CARE EDUCATION/TRAINING PROGRAM

## 2025-03-08 PROCEDURE — 2500000005 HC RX 250 GENERAL PHARMACY W/O HCPCS: Performed by: STUDENT IN AN ORGANIZED HEALTH CARE EDUCATION/TRAINING PROGRAM

## 2025-03-08 PROCEDURE — 22842 INSERT SPINE FIXATION DEVICE: CPT | Performed by: STUDENT IN AN ORGANIZED HEALTH CARE EDUCATION/TRAINING PROGRAM

## 2025-03-08 PROCEDURE — 63709 REPAIR SPINAL FLUID LEAKAGE: CPT | Performed by: STUDENT IN AN ORGANIZED HEALTH CARE EDUCATION/TRAINING PROGRAM

## 2025-03-08 PROCEDURE — 2720000007 HC OR 272 NO HCPCS: Performed by: STUDENT IN AN ORGANIZED HEALTH CARE EDUCATION/TRAINING PROGRAM

## 2025-03-08 PROCEDURE — 99233 SBSQ HOSP IP/OBS HIGH 50: CPT | Performed by: STUDENT IN AN ORGANIZED HEALTH CARE EDUCATION/TRAINING PROGRAM

## 2025-03-08 PROCEDURE — 20930 SP BONE ALGRFT MORSEL ADD-ON: CPT | Performed by: STUDENT IN AN ORGANIZED HEALTH CARE EDUCATION/TRAINING PROGRAM

## 2025-03-08 PROCEDURE — 83735 ASSAY OF MAGNESIUM: CPT | Performed by: STUDENT IN AN ORGANIZED HEALTH CARE EDUCATION/TRAINING PROGRAM

## 2025-03-08 PROCEDURE — 97607 NEG PRS WND THR NDME<=50SQCM: CPT | Performed by: STUDENT IN AN ORGANIZED HEALTH CARE EDUCATION/TRAINING PROGRAM

## 2025-03-08 PROCEDURE — 20936 SP BONE AGRFT LOCAL ADD-ON: CPT | Performed by: STUDENT IN AN ORGANIZED HEALTH CARE EDUCATION/TRAINING PROGRAM

## 2025-03-08 PROCEDURE — 51702 INSERT TEMP BLADDER CATH: CPT

## 2025-03-08 PROCEDURE — 82947 ASSAY GLUCOSE BLOOD QUANT: CPT

## 2025-03-08 PROCEDURE — 8E0WXBZ COMPUTER ASSISTED PROCEDURE OF TRUNK REGION: ICD-10-PCS | Performed by: STUDENT IN AN ORGANIZED HEALTH CARE EDUCATION/TRAINING PROGRAM

## 2025-03-08 PROCEDURE — 1210000001 HC SEMI-PRIVATE ROOM DAILY

## 2025-03-08 PROCEDURE — 2500000004 HC RX 250 GENERAL PHARMACY W/ HCPCS (ALT 636 FOR OP/ED)

## 2025-03-08 PROCEDURE — 00NY0ZZ RELEASE LUMBAR SPINAL CORD, OPEN APPROACH: ICD-10-PCS | Performed by: STUDENT IN AN ORGANIZED HEALTH CARE EDUCATION/TRAINING PROGRAM

## 2025-03-08 PROCEDURE — 2500000004 HC RX 250 GENERAL PHARMACY W/ HCPCS (ALT 636 FOR OP/ED): Performed by: NURSE PRACTITIONER

## 2025-03-08 PROCEDURE — A22610 PR ARTHRODESIS POSTERIOR/POSTEROLATERAL THORACIC

## 2025-03-08 PROCEDURE — A22610 PR ARTHRODESIS POSTERIOR/POSTEROLATERAL THORACIC: Performed by: ANESTHESIOLOGY

## 2025-03-08 PROCEDURE — 94640 AIRWAY INHALATION TREATMENT: CPT

## 2025-03-08 PROCEDURE — 7100000002 HC RECOVERY ROOM TIME - EACH INCREMENTAL 1 MINUTE: Performed by: STUDENT IN AN ORGANIZED HEALTH CARE EDUCATION/TRAINING PROGRAM

## 2025-03-08 PROCEDURE — 84132 ASSAY OF SERUM POTASSIUM: CPT | Performed by: STUDENT IN AN ORGANIZED HEALTH CARE EDUCATION/TRAINING PROGRAM

## 2025-03-08 PROCEDURE — C1889 IMPLANT/INSERT DEVICE, NOC: HCPCS | Performed by: STUDENT IN AN ORGANIZED HEALTH CARE EDUCATION/TRAINING PROGRAM

## 2025-03-08 DEVICE — DURAGEN® PLUS DURAL REGENERATION MATRIX, 1 IN X 1 IN (2.5 CM X 2.5 CM)
Type: IMPLANTABLE DEVICE | Site: SPINE THORACIC | Status: FUNCTIONAL
Brand: DURAGEN® PLUS

## 2025-03-08 DEVICE — BONE, CHIPS, CANCELLOUS, ASEPTIC,90 CC: Type: IMPLANTABLE DEVICE | Site: SPINE THORACIC | Status: FUNCTIONAL

## 2025-03-08 DEVICE — SCREW, RELINE LOCK, 5.5MM OPEN TULIP: Type: IMPLANTABLE DEVICE | Site: SPINE THORACIC | Status: FUNCTIONAL

## 2025-03-08 DEVICE — ROD, RELINE-O, 5.5 X 70MM, LORDOTIC: Type: IMPLANTABLE DEVICE | Site: SPINE THORACIC | Status: FUNCTIONAL

## 2025-03-08 DEVICE — BONE GRAFT PUTTY OSSIFUSE FLOWABLE FIBER 10CC: Type: IMPLANTABLE DEVICE | Site: SPINE THORACIC | Status: FUNCTIONAL

## 2025-03-08 DEVICE — SCREW, RELINE-O, 6.5X45MM 2S POLYAXIAL: Type: IMPLANTABLE DEVICE | Site: SPINE THORACIC | Status: FUNCTIONAL

## 2025-03-08 RX ORDER — ALBUTEROL SULFATE 0.83 MG/ML
2.5 SOLUTION RESPIRATORY (INHALATION)
Status: DISCONTINUED | OUTPATIENT
Start: 2025-03-08 | End: 2025-03-09

## 2025-03-08 RX ORDER — NALOXONE HYDROCHLORIDE 0.4 MG/ML
0.2 INJECTION, SOLUTION INTRAMUSCULAR; INTRAVENOUS; SUBCUTANEOUS EVERY 5 MIN PRN
Status: ACTIVE | OUTPATIENT
Start: 2025-03-08

## 2025-03-08 RX ORDER — HYDRALAZINE HYDROCHLORIDE 20 MG/ML
10 INJECTION INTRAMUSCULAR; INTRAVENOUS ONCE
Status: COMPLETED | OUTPATIENT
Start: 2025-03-08 | End: 2025-03-08

## 2025-03-08 RX ORDER — FENTANYL CITRATE 50 UG/ML
INJECTION, SOLUTION INTRAMUSCULAR; INTRAVENOUS AS NEEDED
Status: DISCONTINUED | OUTPATIENT
Start: 2025-03-08 | End: 2025-03-08

## 2025-03-08 RX ORDER — ESMOLOL HYDROCHLORIDE 10 MG/ML
INJECTION INTRAVENOUS AS NEEDED
Status: DISCONTINUED | OUTPATIENT
Start: 2025-03-08 | End: 2025-03-08

## 2025-03-08 RX ORDER — PROPOFOL 10 MG/ML
INJECTION, EMULSION INTRAVENOUS CONTINUOUS PRN
Status: DISCONTINUED | OUTPATIENT
Start: 2025-03-08 | End: 2025-03-08

## 2025-03-08 RX ORDER — METHOCARBAMOL 100 MG/ML
1000 INJECTION, SOLUTION INTRAMUSCULAR; INTRAVENOUS ONCE
Status: COMPLETED | OUTPATIENT
Start: 2025-03-08 | End: 2025-03-08

## 2025-03-08 RX ORDER — ROCURONIUM BROMIDE 10 MG/ML
INJECTION, SOLUTION INTRAVENOUS AS NEEDED
Status: DISCONTINUED | OUTPATIENT
Start: 2025-03-08 | End: 2025-03-08

## 2025-03-08 RX ORDER — CEFAZOLIN 1 G/1
INJECTION, POWDER, FOR SOLUTION INTRAVENOUS AS NEEDED
Status: DISCONTINUED | OUTPATIENT
Start: 2025-03-08 | End: 2025-03-08

## 2025-03-08 RX ORDER — ONDANSETRON HYDROCHLORIDE 2 MG/ML
INJECTION, SOLUTION INTRAVENOUS AS NEEDED
Status: DISCONTINUED | OUTPATIENT
Start: 2025-03-08 | End: 2025-03-08

## 2025-03-08 RX ORDER — METOPROLOL TARTRATE 1 MG/ML
5 INJECTION, SOLUTION INTRAVENOUS ONCE
Status: COMPLETED | OUTPATIENT
Start: 2025-03-08 | End: 2025-03-08

## 2025-03-08 RX ORDER — LIDOCAINE HYDROCHLORIDE 10 MG/ML
0.1 INJECTION, SOLUTION INFILTRATION; PERINEURAL ONCE
Status: DISCONTINUED | OUTPATIENT
Start: 2025-03-08 | End: 2025-03-08 | Stop reason: HOSPADM

## 2025-03-08 RX ORDER — ALBUMIN HUMAN 50 G/1000ML
SOLUTION INTRAVENOUS AS NEEDED
Status: DISCONTINUED | OUTPATIENT
Start: 2025-03-08 | End: 2025-03-08

## 2025-03-08 RX ORDER — VANCOMYCIN HYDROCHLORIDE 1 G/20ML
INJECTION, POWDER, LYOPHILIZED, FOR SOLUTION INTRAVENOUS AS NEEDED
Status: DISCONTINUED | OUTPATIENT
Start: 2025-03-08 | End: 2025-03-08 | Stop reason: HOSPADM

## 2025-03-08 RX ORDER — OXYCODONE HYDROCHLORIDE 5 MG/1
5 TABLET ORAL EVERY 6 HOURS PRN
Status: DISCONTINUED | OUTPATIENT
Start: 2025-03-08 | End: 2025-03-09

## 2025-03-08 RX ORDER — LIDOCAINE HCL/PF 100 MG/5ML
SYRINGE (ML) INTRAVENOUS AS NEEDED
Status: DISCONTINUED | OUTPATIENT
Start: 2025-03-08 | End: 2025-03-08

## 2025-03-08 RX ORDER — OXYCODONE HYDROCHLORIDE 5 MG/1
5 TABLET ORAL ONCE
Status: DISCONTINUED | OUTPATIENT
Start: 2025-03-08 | End: 2025-03-08 | Stop reason: HOSPADM

## 2025-03-08 RX ORDER — INSULIN GLARGINE 100 [IU]/ML
40 INJECTION, SOLUTION SUBCUTANEOUS NIGHTLY
Status: ACTIVE | OUTPATIENT
Start: 2025-03-08

## 2025-03-08 RX ORDER — INSULIN LISPRO 100 [IU]/ML
20 INJECTION, SOLUTION INTRAVENOUS; SUBCUTANEOUS ONCE
Status: COMPLETED | OUTPATIENT
Start: 2025-03-08 | End: 2025-03-08

## 2025-03-08 RX ORDER — HYDROMORPHONE HYDROCHLORIDE 0.2 MG/ML
0.2 INJECTION INTRAMUSCULAR; INTRAVENOUS; SUBCUTANEOUS EVERY 5 MIN PRN
Status: DISCONTINUED | OUTPATIENT
Start: 2025-03-08 | End: 2025-03-08 | Stop reason: HOSPADM

## 2025-03-08 RX ORDER — ACETAMINOPHEN 325 MG/1
650 TABLET ORAL ONCE
Status: DISCONTINUED | OUTPATIENT
Start: 2025-03-08 | End: 2025-03-08 | Stop reason: HOSPADM

## 2025-03-08 RX ORDER — SODIUM CHLORIDE, SODIUM LACTATE, POTASSIUM CHLORIDE, CALCIUM CHLORIDE 600; 310; 30; 20 MG/100ML; MG/100ML; MG/100ML; MG/100ML
100 INJECTION, SOLUTION INTRAVENOUS CONTINUOUS
Status: DISCONTINUED | OUTPATIENT
Start: 2025-03-08 | End: 2025-03-08 | Stop reason: HOSPADM

## 2025-03-08 RX ORDER — POLYMYXIN B 500000 [USP'U]/1
INJECTION, POWDER, LYOPHILIZED, FOR SOLUTION INTRAMUSCULAR; INTRATHECAL; INTRAVENOUS; OPHTHALMIC AS NEEDED
Status: DISCONTINUED | OUTPATIENT
Start: 2025-03-08 | End: 2025-03-08 | Stop reason: HOSPADM

## 2025-03-08 RX ORDER — PHENYLEPHRINE HCL IN 0.9% NACL 0.4MG/10ML
SYRINGE (ML) INTRAVENOUS AS NEEDED
Status: DISCONTINUED | OUTPATIENT
Start: 2025-03-08 | End: 2025-03-08

## 2025-03-08 RX ORDER — CEFAZOLIN SODIUM 2 G/100ML
2 INJECTION, SOLUTION INTRAVENOUS EVERY 8 HOURS
Status: DISPENSED | OUTPATIENT
Start: 2025-03-08 | End: 2025-03-10

## 2025-03-08 RX ADMIN — ROSUVASTATIN CALCIUM 40 MG: 20 TABLET, FILM COATED ORAL at 22:11

## 2025-03-08 RX ADMIN — INSULIN LISPRO 3 UNITS: 100 INJECTION, SOLUTION INTRAVENOUS; SUBCUTANEOUS at 08:42

## 2025-03-08 RX ADMIN — INSULIN LISPRO 20 UNITS: 100 INJECTION, SOLUTION INTRAVENOUS; SUBCUTANEOUS at 08:42

## 2025-03-08 RX ADMIN — Medication 120 MCG: at 14:35

## 2025-03-08 RX ADMIN — REMIFENTANIL HYDROCHLORIDE 0.05 MCG/KG/MIN: 1 INJECTION, POWDER, LYOPHILIZED, FOR SOLUTION INTRAVENOUS at 13:49

## 2025-03-08 RX ADMIN — ALBUMIN HUMAN 250 ML: 0.05 INJECTION, SOLUTION INTRAVENOUS at 15:23

## 2025-03-08 RX ADMIN — INSULIN LISPRO 4 UNITS: 100 INJECTION, SOLUTION INTRAVENOUS; SUBCUTANEOUS at 00:27

## 2025-03-08 RX ADMIN — LIDOCAINE HYDROCHLORIDE 100 MG: 20 INJECTION INTRAVENOUS at 13:49

## 2025-03-08 RX ADMIN — INSULIN LISPRO 4 UNITS: 100 INJECTION, SOLUTION INTRAVENOUS; SUBCUTANEOUS at 22:13

## 2025-03-08 RX ADMIN — FENTANYL CITRATE 100 MCG: 50 INJECTION, SOLUTION INTRAMUSCULAR; INTRAVENOUS at 13:49

## 2025-03-08 RX ADMIN — DEXAMETHASONE SODIUM PHOSPHATE 10 MG: 4 INJECTION, SOLUTION INTRA-ARTICULAR; INTRALESIONAL; INTRAMUSCULAR; INTRAVENOUS; SOFT TISSUE at 04:12

## 2025-03-08 RX ADMIN — DEXAMETHASONE SODIUM PHOSPHATE 10 MG: 4 INJECTION, SOLUTION INTRA-ARTICULAR; INTRALESIONAL; INTRAMUSCULAR; INTRAVENOUS; SOFT TISSUE at 12:22

## 2025-03-08 RX ADMIN — HYDROMORPHONE HYDROCHLORIDE 0.5 MG: 1 INJECTION, SOLUTION INTRAMUSCULAR; INTRAVENOUS; SUBCUTANEOUS at 19:31

## 2025-03-08 RX ADMIN — Medication 10 MG: at 22:11

## 2025-03-08 RX ADMIN — INSULIN HUMAN 10 UNITS: 100 INJECTION, SUSPENSION SUBCUTANEOUS at 04:15

## 2025-03-08 RX ADMIN — OXYCODONE 5 MG: 5 TABLET ORAL at 22:10

## 2025-03-08 RX ADMIN — METOROPROLOL TARTRATE 5 MG: 5 INJECTION, SOLUTION INTRAVENOUS at 19:27

## 2025-03-08 RX ADMIN — PROPOFOL 150 MCG/KG/MIN: 10 INJECTION, EMULSION INTRAVENOUS at 13:48

## 2025-03-08 RX ADMIN — INSULIN GLARGINE 40 UNITS: 100 INJECTION, SOLUTION SUBCUTANEOUS at 22:14

## 2025-03-08 RX ADMIN — ALBUTEROL SULFATE 2.5 MG: 2.5 SOLUTION RESPIRATORY (INHALATION) at 07:58

## 2025-03-08 RX ADMIN — ALBUMIN HUMAN 250 ML: 0.05 INJECTION, SOLUTION INTRAVENOUS at 15:03

## 2025-03-08 RX ADMIN — Medication 120 MCG: at 14:03

## 2025-03-08 RX ADMIN — HYDRALAZINE HYDROCHLORIDE 10 MG: 20 INJECTION INTRAMUSCULAR; INTRAVENOUS at 19:26

## 2025-03-08 RX ADMIN — CEFAZOLIN 2 G: 1 INJECTION, POWDER, FOR SOLUTION INTRAMUSCULAR; INTRAVENOUS at 13:55

## 2025-03-08 RX ADMIN — METHOCARBAMOL 1000 MG: 1000 INJECTION, SOLUTION INTRAMUSCULAR; INTRAVENOUS at 19:17

## 2025-03-08 RX ADMIN — PROPOFOL 150 MG: 10 INJECTION, EMULSION INTRAVENOUS at 13:49

## 2025-03-08 RX ADMIN — INSULIN LISPRO 2 UNITS: 100 INJECTION, SOLUTION INTRAVENOUS; SUBCUTANEOUS at 12:21

## 2025-03-08 RX ADMIN — Medication 160 MCG: at 14:15

## 2025-03-08 RX ADMIN — ESMOLOL HYDROCHLORIDE 50 MG: 10 INJECTION, SOLUTION INTRAVENOUS at 18:52

## 2025-03-08 RX ADMIN — GUAIFENESIN 600 MG: 600 TABLET ORAL at 08:41

## 2025-03-08 RX ADMIN — PROPOFOL 60 MG: 10 INJECTION, EMULSION INTRAVENOUS at 18:38

## 2025-03-08 RX ADMIN — BENZONATATE 200 MG: 100 CAPSULE ORAL at 22:11

## 2025-03-08 RX ADMIN — INSULIN LISPRO 3 UNITS: 100 INJECTION, SOLUTION INTRAVENOUS; SUBCUTANEOUS at 04:15

## 2025-03-08 RX ADMIN — GUAIFENESIN 600 MG: 600 TABLET ORAL at 22:11

## 2025-03-08 RX ADMIN — FLUTICASONE FUROATE AND VILANTEROL TRIFENATATE 1 PUFF: 200; 25 POWDER RESPIRATORY (INHALATION) at 07:58

## 2025-03-08 RX ADMIN — GABAPENTIN 300 MG: 300 CAPSULE ORAL at 08:41

## 2025-03-08 RX ADMIN — HYDROMORPHONE HYDROCHLORIDE 0.5 MG: 1 INJECTION, SOLUTION INTRAMUSCULAR; INTRAVENOUS; SUBCUTANEOUS at 20:09

## 2025-03-08 RX ADMIN — GABAPENTIN 300 MG: 300 CAPSULE ORAL at 22:11

## 2025-03-08 RX ADMIN — ONDANSETRON 4 MG: 2 INJECTION, SOLUTION INTRAMUSCULAR; INTRAVENOUS at 18:25

## 2025-03-08 RX ADMIN — MONTELUKAST 10 MG: 10 TABLET, FILM COATED ORAL at 22:11

## 2025-03-08 RX ADMIN — CEFAZOLIN 2 G: 1 INJECTION, POWDER, FOR SOLUTION INTRAMUSCULAR; INTRAVENOUS at 17:53

## 2025-03-08 RX ADMIN — ROCURONIUM BROMIDE 50 MG: 10 INJECTION INTRAVENOUS at 13:50

## 2025-03-08 RX ADMIN — SODIUM CHLORIDE, SODIUM LACTATE, POTASSIUM CHLORIDE, AND CALCIUM CHLORIDE: 600; 310; 30; 20 INJECTION, SOLUTION INTRAVENOUS at 13:52

## 2025-03-08 SDOH — HEALTH STABILITY: MENTAL HEALTH: CURRENT SMOKER: 0

## 2025-03-08 ASSESSMENT — PAIN SCALES - GENERAL
PAINLEVEL_OUTOF10: 0 - NO PAIN
PAINLEVEL_OUTOF10: 0 - NO PAIN
PAINLEVEL_OUTOF10: 7
PAINLEVEL_OUTOF10: 6
PAINLEVEL_OUTOF10: 7
PAINLEVEL_OUTOF10: 6
PAINLEVEL_OUTOF10: 8

## 2025-03-08 ASSESSMENT — PAIN - FUNCTIONAL ASSESSMENT
PAIN_FUNCTIONAL_ASSESSMENT: 0-10
PAIN_FUNCTIONAL_ASSESSMENT: 0-10
PAIN_FUNCTIONAL_ASSESSMENT: UNABLE TO SELF-REPORT
PAIN_FUNCTIONAL_ASSESSMENT: 0-10

## 2025-03-08 ASSESSMENT — COGNITIVE AND FUNCTIONAL STATUS - GENERAL
TURNING FROM BACK TO SIDE WHILE IN FLAT BAD: TOTAL
WALKING IN HOSPITAL ROOM: TOTAL
DRESSING REGULAR LOWER BODY CLOTHING: TOTAL
PERSONAL GROOMING: TOTAL
MOBILITY SCORE: 7
HELP NEEDED FOR BATHING: TOTAL
EATING MEALS: A LITTLE
STANDING UP FROM CHAIR USING ARMS: TOTAL
CLIMB 3 TO 5 STEPS WITH RAILING: TOTAL
MOVING TO AND FROM BED TO CHAIR: TOTAL
MOVING FROM LYING ON BACK TO SITTING ON SIDE OF FLAT BED WITH BEDRAILS: A LOT
DRESSING REGULAR UPPER BODY CLOTHING: TOTAL
TOILETING: TOTAL
DAILY ACTIVITIY SCORE: 8

## 2025-03-08 ASSESSMENT — PAIN DESCRIPTION - LOCATION: LOCATION: BACK

## 2025-03-08 ASSESSMENT — PAIN SCALES - PAIN ASSESSMENT IN ADVANCED DEMENTIA (PAINAD): TOTALSCORE: MEDICATION (SEE MAR)

## 2025-03-08 ASSESSMENT — PAIN DESCRIPTION - ORIENTATION: ORIENTATION: RIGHT;LEFT;MID

## 2025-03-08 NOTE — ANESTHESIA PROCEDURE NOTES
Peripheral IV  Date/Time: 3/8/2025 1:56 PM  Inserted by: Maxime Aparicio DO    Placement  Needle size: 16 G  Laterality: right  Location: hand  Local anesthetic: none  Site prep: alcohol  Technique: anatomical landmarks  Attempts: 1

## 2025-03-08 NOTE — ANESTHESIA PREPROCEDURE EVALUATION
Patient: Abbie Mayo    Procedure Information       Date: 03/08/25    Procedure: T9-11 laminectomy and PSIF with navigation (Bilateral: Spine Thoracic)    Location: [facility] OR    Surgeons: Arun ELIZALDE MD          Presented to [facility] from [place] for evaluation of suspected cauda equina syndrome.   BLE weakness and urinary incontinence over the past 3 years, with an acute worsening 3 days prior to arrival after a fall. 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 Poorly visualized anatomical structures due to suboptimal image quality.   2. Left ventricular ejection fraction is normal, calculated by Obrien's biplane at 62%.   3. Left ventricular diastolic filling was indeterminate.   4. Unable to determine right ventricular systolic function.   5. No prior echocardiogram available for comparison.    Clinical information reviewed:   Tobacco  Allergies  Meds   Med Hx  Surg Hx   Fam Hx  Soc Hx        NPO Detail:  No data recorded     Physical Exam    Airway  Mallampati: II  TM distance: >3 FB  Neck ROM: full     Cardiovascular - normal exam  Rhythm: regular  Rate: normal     Dental   (+) upper dentures     Pulmonary - normal exam  Breath  sounds clear to auscultation     Abdominal            Anesthesia Plan    History of general anesthesia?: yes  History of complications of general anesthesia?: no    ASA 3     general     The patient is not a current smoker.  Patient did not smoke on day of procedure.    intravenous induction   Anesthetic plan and risks discussed with patient.  Use of blood products discussed with patient who consented to blood products.    Plan discussed with attending.

## 2025-03-08 NOTE — PROGRESS NOTES
"Orthopaedic Surgery Progress Note    Subjective: No acute events.  Endorsing continued weakness and numbness to her bilateral lower extremities.  Reports that her blood pressure was a little high earlier.    Objective:  BP (!) 176/94   Pulse (!) 117   Temp 36.4 °C (97.5 °F)   Resp 18   Ht 1.676 m (5' 5.98\")   Wt 99.7 kg (219 lb 12.8 oz)   SpO2 96%   BMI 35.49 kg/m²     Gen: arousable, NAD, appropriately conversational  Cardiac: RRR to peripheral palpation  Resp: nonlabored on RA  GI: soft, nondistended    MSK:  L1: Decreased sensation       L2: Decreased sensation      hip flexors 2/5 Left; 3/5 Right  L3: Decreased sensation      knee extension 5/5 Left; 5/5 Right  L4: Decreased sensation     tib Ant. (Dorsiflexion) 5/5 Left; 5/5 Right  L5: Decreased sensation    EHL5/5 Left; 5/5 Right  S1: Decreased sensation plantar flexion 5/5 Left; 5/5 Right      No ankle clonus, negative babinski    Assessment/Plan: 69 y.o. female with progressive thoracic myelopathy    Plan:  - Recommend aggressive management of blood glucose  - Dispo: Medicine Primary  - OR: Posted for T9-11 Laminectomy w/ Dr. Murguia 3/8  - Clear per primary team  - WB: OOBAT, no restrictions  - Abx: Periop Ancef  - Diet: NPO for OR today  - Please maintain type and screen  - Kelley: recommend placement     Dispo: Pending OR    Plan was discussed with attending Dr. Murguia    While admitted, this patient will be followed by the Ortho Spine Team, available via Epic Chat weekdays 6a-6p. Please page [phone] on nights and weekends.    Ortho Spine  First Call: John Colunga, PGY-2  Second Call: Alix Diaz, PGY-4  Third Call: Marquise Pastor, Fellow    John Colunga MD  Orthopedic Surgery, PGY-2  Available via Epic Chat  "

## 2025-03-08 NOTE — ANESTHESIA PROCEDURE NOTES
Arterial Line:    Date/Time: 3/8/2025 2:02 PM    Staffing  Performed: CAA   Authorized by: Juan Duran DO    Performed by: MICHAEL Mendoza    An arterial line was placed. Procedure performed using ultrasound guidance.in the OR for the following indication(s): continuous blood pressure monitoring.    A 20 gauge (size) (length), Angiocath (type) catheter was placed into the secured by Tegaderm,   Seldinger technique used.  Events:  patient tolerated procedure well with no complications.      Additional notes:  Chloraprep, sterile gloves, wire used and removed, good blood return, good waveform.  1 attempt DUANE, 1 attempt CAA

## 2025-03-08 NOTE — PROGRESS NOTES
Ms. Abbie Mayo is a 69 y.o. female who is on hospital day #1 for No chief complaint on file..      Assessment/Plan     Ms. Mayo is a 68y/o lady with history of T2DM (A1C 10.6%, 9/2024), HTN, asthma, and chronic thoracic spinal stenosis c/b LE weakness and urinary incontinence who presented due to acute worsening of LE weakness. S/p T9-11 laminectomy with PSIF with Dr. Murguia (3/8).     # Thoracic spinal stenosis  # LE weakness  # Urinary incontinence  Patient with 6mo history of slowly worsening back pain and LE weakness. Patient started developing urinary incontinence and was evaluated by neurosurgery on 8/2024. She was planned for surgery several times, but was unable to procedure due to an asthma exacerbation and then uncontrolled diabetes.   - MRI from [facility] with severe thoracic stenosis with cord compression and cord signal change at T9-10 and T10-11   - s/p IV dexamethasone 10mg q8hr x3 doses, added NPH 20U q8hr to mitigate steroid hyperglycemia  - Ortho spine consulted, s/p T9-11 laminectomy with PSIF with Dr. Murguia (3/8)  - PT/OT consulted    # T2DM  A1C 10.6 (9/2024). Home regimen is lantus 48U at bedtime + lispro 20U TIDAC with metformin. Sugars not well-controlled.  - Has been NPO for procedure, so was started on lantus 30U at bedtime + ISS  - Will increase to lantus 40U at bedtime + lispro 10U TIDAC tomorrow    # HTN - holding home losartan and verapamil perioperatively, will restart tomorrow  # Asthma - continue home Breo + montelukast    CORE MEASURES  N: diabetic diet  P: subcutaneous lovenox  C: Full code, confirmed  HCP: Jorge, spouse [phone]   Dispo: pending PT/OT    55 minutes were spent on patient care, chart review, and discussion with the care team and family.    Genevieve Crawford MD      Subjective    Patient states her LE weakness and saddle anesthesia slightly improved after getting the IV dexamethasone.     Objective    Vitals:    03/08/25 1254   BP: 177/86   Pulse: 98    Resp: 16   Temp: 36.3 °C (97.3 °F)   SpO2: 94%        Intake/Output Summary (Last 24 hours) at 3/8/2025 1454  Last data filed at 3/8/2025 1425  Gross per 24 hour   Intake 60 ml   Output 1800 ml   Net -1740 ml        GEN: well-appearing, no acute distress  HEENT: moist mucous membranes, no scleral icterus  NECK: Supple  CV: RRR, no murmurs/rubs/gallops  PULM: Breathing comfortably, clear to auscultation bilaterally  AB: Soft, non-tender, non-distended  : No in-dwelling naqvi  MSK: No lower extremity edema bilaterally, 3/5 hip flexion, 3/5 knee flexion/extension, 4/5 dorsi/plantarflexion, (+) saddle anesthesia  NEURO: A&Ox3, following commands, conversational    LABS AND STUDIES  Relevant labs and studies independently reviewed and interpreted.    MEDICATIONS  [Transfer Hold] albuterol, 2.5 mg, nebulization, q6h  [Transfer Hold] aspirin, 81 mg, oral, Daily  [Transfer Hold] fluticasone furoate-vilanteroL, 1 puff, inhalation, Daily  [Transfer Hold] gabapentin, 300 mg, oral, BID  [Transfer Hold] guaiFENesin, 600 mg, oral, BID  [Transfer Hold] insulin glargine, 40 Units, subcutaneous, Nightly  [Transfer Hold] insulin lispro, 0-5 Units, subcutaneous, q4h  [Held by provider] losartan, 100 mg, oral, Daily  [Transfer Hold] montelukast, 10 mg, oral, Nightly  [Transfer Hold] rosuvastatin, 40 mg, oral, Nightly  [Held by provider] verapamil, 120 mg, oral, TID

## 2025-03-08 NOTE — ANESTHESIA PROCEDURE NOTES
Airway  Date/Time: 3/8/2025 1:52 PM  Urgency: elective    Airway not difficult    Staffing  Performed: DUANE   Authorized by: MICHAEL Mendoza    Performed by: MICHAEL Mendoza  Patient location during procedure: OR    Indications and Patient Condition  Indications for airway management: anesthesia and airway protection  Spontaneous ventilation: present  Sedation level: deep  Preoxygenated: yes  Patient position: sniffing  MILS not maintained throughout  Mask difficulty assessment: 1 - vent by mask    Final Airway Details  Final airway type: endotracheal airway      Successful airway: ETT  Cuffed: yes   Successful intubation technique: video laryngoscopy  Blade: Catalino  Blade size: #3  ETT size (mm): 7.0  Cormack-Lehane Classification: grade I - full view of glottis  Placement verified by: chest auscultation and capnometry   Measured from: lips  ETT to lips (cm): 21  Number of attempts at approach: 1    Additional Comments  Secured with SILK TAPE

## 2025-03-08 NOTE — ANESTHESIA PROCEDURE NOTES
Peripheral IV  Date/Time: 3/8/2025 1:52 PM  Inserted by: Maxime Aparicio DO    Placement  Needle size: 16 G  Laterality: left  Location: hand  Local anesthetic: none  Site prep: alcohol  Technique: anatomical landmarks  Attempts: 1

## 2025-03-08 NOTE — CARE PLAN
The patient's goals for the shift include      The clinical goals for the shift include Pt will achieve a blood glucose <200 by the end of shift

## 2025-03-08 NOTE — H&P
[facility] Department of Orthopaedic Surgery   Surgical History & Physical <30 Days    History & Physical Reviewed:  H&P reviewed. The patient was examined and there are no changes to the H&P. Patient electing to proceed with surgery. Patient consented and posted. Relevant findings and updates are noted below:  No significant changes.    Home medications were reviewed with significant updates noted below:  No significant changes.      03/08/25 at 1:25 AM - John Colunga MD

## 2025-03-08 NOTE — PROGRESS NOTES
"Recreation Therapy Note    Therapy Session  Visit Type: New visit  Session Start Time: 1800  Session End Time: 1850  Intervention Delivery: In-person  Conflict of Service:  (none)  Number of family members present: 0  Family Present for Session: None  Family Participation: None  Number of staff members present: 1    Pre-assessment  Mood/Affect: Calm, Appropriate  Verbalized Emotional State:  (none)    Treatment  Areas of Focus: Coping, Socialization, Self-expression, Normalization, Stress reduction  Co-Treatment:  (none)  Interruption: No  Patient Fell Asleep at End of Session: No    Post-assessment  Mood/Affect: Appropriate  Verbalized Emotional State:  (none)  Continue Visiting: Yes  Total Session Time (min): 50 minutes    Narrative  Assessment Detail: Upon arrival patient was lying in bed watching tv.  Agreeable to engaging in a recreational therapy session.  Plan: To encourage the exploration of safe and effective positive leisure coping skills to manage situational stressors.  Intervention: Patient  chose to play bingo.  Evaluation: Patient was pleasant, invested and social throughout the session.  Patient played bingo along with her roommate.  At the end of the session stated her enjoyment and said. \"That was the most excitement I had since I was here.\"  Follow-up: Will continue to encourge the exploration of positive leisure coping skills.    Abbie Mayo is a 69 y.o. female with a past medical history of poorly controlled T2DM (last a1c 11 3/2025), HTN, and asthma who presented to [facility] from [place] for evaluation of suspected cauda equina syndrome. She originally presented to the OSH with worsening BLE weakness and urinary incontinence over the past 3 years, with an acute worsening 3 days prior to arrival after a fall. Imaging showed disc changes and compression fracture of T8 and T9. Neurosurgery at [place] recommended transfer to [facility] as they are unable to consult or perform surgery at this time " per EMR review. She arrived to [facility] without incident. She endorses a cough, BLE weakness and pain. She denies any fever, chills, shortness of breath, chest pain, nausea, or vomiting.

## 2025-03-09 ENCOUNTER — APPOINTMENT (OUTPATIENT)
Dept: CARDIOLOGY | Facility: HOSPITAL | Age: 70
DRG: 029 | End: 2025-03-09
Payer: MEDICARE

## 2025-03-09 VITALS
BODY MASS INDEX: 35.32 KG/M2 | WEIGHT: 219.8 LBS | SYSTOLIC BLOOD PRESSURE: 138 MMHG | HEIGHT: 66 IN | DIASTOLIC BLOOD PRESSURE: 79 MMHG | RESPIRATION RATE: 22 BRPM | TEMPERATURE: 100 F | HEART RATE: 133 BPM | OXYGEN SATURATION: 95 %

## 2025-03-09 LAB
ALBUMIN SERPL BCP-MCNC: 3.9 G/DL (ref 3.4–5)
ANION GAP SERPL CALC-SCNC: 11 MMOL/L (ref 10–20)
BASOPHILS # BLD AUTO: 0 X10*3/UL (ref 0–0.1)
BASOPHILS NFR BLD AUTO: 0 %
BUN SERPL-MCNC: 16 MG/DL (ref 6–23)
CALCIUM SERPL-MCNC: 8.8 MG/DL (ref 8.6–10.6)
CHLORIDE SERPL-SCNC: 106 MMOL/L (ref 98–107)
CO2 SERPL-SCNC: 28 MMOL/L (ref 21–32)
CREAT SERPL-MCNC: 0.87 MG/DL (ref 0.5–1.05)
EGFRCR SERPLBLD CKD-EPI 2021: 73 ML/MIN/1.73M*2
EOSINOPHIL # BLD AUTO: 0 X10*3/UL (ref 0–0.7)
EOSINOPHIL NFR BLD AUTO: 0 %
ERYTHROCYTE [DISTWIDTH] IN BLOOD BY AUTOMATED COUNT: 13.2 % (ref 11.5–14.5)
ERYTHROCYTE [DISTWIDTH] IN BLOOD BY AUTOMATED COUNT: 13.3 % (ref 11.5–14.5)
GLUCOSE BLD MANUAL STRIP-MCNC: 239 MG/DL (ref 74–99)
GLUCOSE BLD MANUAL STRIP-MCNC: 251 MG/DL (ref 74–99)
GLUCOSE BLD MANUAL STRIP-MCNC: 270 MG/DL (ref 74–99)
GLUCOSE BLD MANUAL STRIP-MCNC: 277 MG/DL (ref 74–99)
GLUCOSE BLD MANUAL STRIP-MCNC: 286 MG/DL (ref 74–99)
GLUCOSE BLD MANUAL STRIP-MCNC: 370 MG/DL (ref 74–99)
GLUCOSE SERPL-MCNC: 296 MG/DL (ref 74–99)
HCT VFR BLD AUTO: 31.2 % (ref 36–46)
HCT VFR BLD AUTO: 37.2 % (ref 36–46)
HGB BLD-MCNC: 11.5 G/DL (ref 12–16)
HGB BLD-MCNC: 9.7 G/DL (ref 12–16)
IMM GRANULOCYTES # BLD AUTO: 0.09 X10*3/UL (ref 0–0.7)
IMM GRANULOCYTES NFR BLD AUTO: 0.7 % (ref 0–0.9)
LYMPHOCYTES # BLD AUTO: 0.68 X10*3/UL (ref 1.2–4.8)
LYMPHOCYTES NFR BLD AUTO: 5.4 %
MAGNESIUM SERPL-MCNC: 1.9 MG/DL (ref 1.6–2.4)
MCH RBC QN AUTO: 26.9 PG (ref 26–34)
MCH RBC QN AUTO: 27.1 PG (ref 26–34)
MCHC RBC AUTO-ENTMCNC: 30.9 G/DL (ref 32–36)
MCHC RBC AUTO-ENTMCNC: 31.1 G/DL (ref 32–36)
MCV RBC AUTO: 86 FL (ref 80–100)
MCV RBC AUTO: 88 FL (ref 80–100)
MONOCYTES # BLD AUTO: 1.61 X10*3/UL (ref 0.1–1)
MONOCYTES NFR BLD AUTO: 12.9 %
NEUTROPHILS # BLD AUTO: 10.13 X10*3/UL (ref 1.2–7.7)
NEUTROPHILS NFR BLD AUTO: 81 %
NRBC BLD-RTO: 0 /100 WBCS (ref 0–0)
NRBC BLD-RTO: 0 /100 WBCS (ref 0–0)
PHOSPHATE SERPL-MCNC: 3.8 MG/DL (ref 2.5–4.9)
PLATELET # BLD AUTO: 196 X10*3/UL (ref 150–450)
PLATELET # BLD AUTO: 253 X10*3/UL (ref 150–450)
POTASSIUM SERPL-SCNC: 4.1 MMOL/L (ref 3.5–5.3)
RBC # BLD AUTO: 3.61 X10*6/UL (ref 4–5.2)
RBC # BLD AUTO: 4.25 X10*6/UL (ref 4–5.2)
SODIUM SERPL-SCNC: 141 MMOL/L (ref 136–145)
WBC # BLD AUTO: 12.2 X10*3/UL (ref 4.4–11.3)
WBC # BLD AUTO: 12.5 X10*3/UL (ref 4.4–11.3)

## 2025-03-09 PROCEDURE — 94640 AIRWAY INHALATION TREATMENT: CPT

## 2025-03-09 PROCEDURE — 36415 COLL VENOUS BLD VENIPUNCTURE: CPT | Performed by: STUDENT IN AN ORGANIZED HEALTH CARE EDUCATION/TRAINING PROGRAM

## 2025-03-09 PROCEDURE — 2500000004 HC RX 250 GENERAL PHARMACY W/ HCPCS (ALT 636 FOR OP/ED): Mod: TB | Performed by: STUDENT IN AN ORGANIZED HEALTH CARE EDUCATION/TRAINING PROGRAM

## 2025-03-09 PROCEDURE — 2500000001 HC RX 250 WO HCPCS SELF ADMINISTERED DRUGS (ALT 637 FOR MEDICARE OP): Performed by: STUDENT IN AN ORGANIZED HEALTH CARE EDUCATION/TRAINING PROGRAM

## 2025-03-09 PROCEDURE — 80069 RENAL FUNCTION PANEL: CPT | Performed by: STUDENT IN AN ORGANIZED HEALTH CARE EDUCATION/TRAINING PROGRAM

## 2025-03-09 PROCEDURE — 1200000002 HC GENERAL ROOM WITH TELEMETRY DAILY

## 2025-03-09 PROCEDURE — 2500000002 HC RX 250 W HCPCS SELF ADMINISTERED DRUGS (ALT 637 FOR MEDICARE OP, ALT 636 FOR OP/ED): Performed by: STUDENT IN AN ORGANIZED HEALTH CARE EDUCATION/TRAINING PROGRAM

## 2025-03-09 PROCEDURE — 99233 SBSQ HOSP IP/OBS HIGH 50: CPT | Performed by: STUDENT IN AN ORGANIZED HEALTH CARE EDUCATION/TRAINING PROGRAM

## 2025-03-09 PROCEDURE — 83735 ASSAY OF MAGNESIUM: CPT | Performed by: STUDENT IN AN ORGANIZED HEALTH CARE EDUCATION/TRAINING PROGRAM

## 2025-03-09 PROCEDURE — 93010 ELECTROCARDIOGRAM REPORT: CPT | Performed by: INTERNAL MEDICINE

## 2025-03-09 PROCEDURE — 93005 ELECTROCARDIOGRAM TRACING: CPT

## 2025-03-09 PROCEDURE — 2500000004 HC RX 250 GENERAL PHARMACY W/ HCPCS (ALT 636 FOR OP/ED): Performed by: STUDENT IN AN ORGANIZED HEALTH CARE EDUCATION/TRAINING PROGRAM

## 2025-03-09 PROCEDURE — 82947 ASSAY GLUCOSE BLOOD QUANT: CPT

## 2025-03-09 PROCEDURE — 85027 COMPLETE CBC AUTOMATED: CPT | Performed by: STUDENT IN AN ORGANIZED HEALTH CARE EDUCATION/TRAINING PROGRAM

## 2025-03-09 PROCEDURE — 85025 COMPLETE CBC W/AUTO DIFF WBC: CPT | Performed by: STUDENT IN AN ORGANIZED HEALTH CARE EDUCATION/TRAINING PROGRAM

## 2025-03-09 RX ORDER — OXYCODONE HYDROCHLORIDE 10 MG/1
10 TABLET ORAL EVERY 4 HOURS PRN
Status: DISPENSED | OUTPATIENT
Start: 2025-03-09

## 2025-03-09 RX ORDER — ACETAMINOPHEN 325 MG/1
975 TABLET ORAL EVERY 6 HOURS
Status: DISPENSED | OUTPATIENT
Start: 2025-03-09

## 2025-03-09 RX ORDER — INSULIN LISPRO 100 [IU]/ML
10 INJECTION, SOLUTION INTRAVENOUS; SUBCUTANEOUS
Status: ACTIVE | OUTPATIENT
Start: 2025-03-09

## 2025-03-09 RX ORDER — METHOCARBAMOL 500 MG/1
1000 TABLET, FILM COATED ORAL EVERY 8 HOURS SCHEDULED
Status: DISPENSED | OUTPATIENT
Start: 2025-03-09

## 2025-03-09 RX ORDER — ALBUTEROL SULFATE 0.83 MG/ML
2.5 SOLUTION RESPIRATORY (INHALATION) EVERY 6 HOURS PRN
Status: ACTIVE | OUTPATIENT
Start: 2025-03-09

## 2025-03-09 RX ORDER — LATANOPROST 50 UG/ML
1 SOLUTION/ DROPS OPHTHALMIC NIGHTLY
Status: DISPENSED | OUTPATIENT
Start: 2025-03-09

## 2025-03-09 RX ORDER — INSULIN LISPRO 100 [IU]/ML
0-10 INJECTION, SOLUTION INTRAVENOUS; SUBCUTANEOUS
Status: ACTIVE | OUTPATIENT
Start: 2025-03-09

## 2025-03-09 RX ORDER — ENOXAPARIN SODIUM 100 MG/ML
40 INJECTION SUBCUTANEOUS
Status: ACTIVE | OUTPATIENT
Start: 2025-03-11

## 2025-03-09 RX ADMIN — SODIUM CHLORIDE, POTASSIUM CHLORIDE, SODIUM LACTATE AND CALCIUM CHLORIDE 1000 ML: 600; 310; 30; 20 INJECTION, SOLUTION INTRAVENOUS at 15:37

## 2025-03-09 RX ADMIN — OXYCODONE 5 MG: 5 TABLET ORAL at 09:25

## 2025-03-09 RX ADMIN — INSULIN LISPRO 3 UNITS: 100 INJECTION, SOLUTION INTRAVENOUS; SUBCUTANEOUS at 03:17

## 2025-03-09 RX ADMIN — ACETAMINOPHEN 975 MG: 325 TABLET ORAL at 18:14

## 2025-03-09 RX ADMIN — DEXAMETHASONE SODIUM PHOSPHATE 10 MG: 4 INJECTION, SOLUTION INTRA-ARTICULAR; INTRALESIONAL; INTRAMUSCULAR; INTRAVENOUS; SOFT TISSUE at 00:00

## 2025-03-09 RX ADMIN — ALBUTEROL SULFATE 2.5 MG: 2.5 SOLUTION RESPIRATORY (INHALATION) at 13:33

## 2025-03-09 RX ADMIN — OXYCODONE 5 MG: 5 TABLET ORAL at 03:21

## 2025-03-09 RX ADMIN — DEXAMETHASONE SODIUM PHOSPHATE 10 MG: 4 INJECTION, SOLUTION INTRA-ARTICULAR; INTRALESIONAL; INTRAMUSCULAR; INTRAVENOUS; SOFT TISSUE at 09:41

## 2025-03-09 RX ADMIN — CEFAZOLIN SODIUM 2 G: 2 INJECTION, SOLUTION INTRAVENOUS at 00:01

## 2025-03-09 RX ADMIN — LATANOPROST 1 DROP: 50 SOLUTION OPHTHALMIC at 20:58

## 2025-03-09 RX ADMIN — INSULIN GLARGINE 40 UNITS: 100 INJECTION, SOLUTION SUBCUTANEOUS at 20:57

## 2025-03-09 RX ADMIN — GUAIFENESIN 600 MG: 600 TABLET ORAL at 09:25

## 2025-03-09 RX ADMIN — GABAPENTIN 300 MG: 300 CAPSULE ORAL at 09:25

## 2025-03-09 RX ADMIN — INSULIN LISPRO 6 UNITS: 100 INJECTION, SOLUTION INTRAVENOUS; SUBCUTANEOUS at 14:13

## 2025-03-09 RX ADMIN — LOSARTAN POTASSIUM 100 MG: 25 TABLET, FILM COATED ORAL at 09:25

## 2025-03-09 RX ADMIN — OXYCODONE HYDROCHLORIDE 10 MG: 10 TABLET ORAL at 21:09

## 2025-03-09 RX ADMIN — INSULIN LISPRO 3 UNITS: 100 INJECTION, SOLUTION INTRAVENOUS; SUBCUTANEOUS at 06:02

## 2025-03-09 RX ADMIN — ALBUTEROL SULFATE 2.5 MG: 2.5 SOLUTION RESPIRATORY (INHALATION) at 20:23

## 2025-03-09 RX ADMIN — INSULIN LISPRO 10 UNITS: 100 INJECTION, SOLUTION INTRAVENOUS; SUBCUTANEOUS at 18:23

## 2025-03-09 RX ADMIN — GABAPENTIN 300 MG: 300 CAPSULE ORAL at 20:58

## 2025-03-09 RX ADMIN — SODIUM CHLORIDE 1000 ML: 9 INJECTION, SOLUTION INTRAVENOUS at 20:44

## 2025-03-09 RX ADMIN — ALBUTEROL SULFATE 2.5 MG: 2.5 SOLUTION RESPIRATORY (INHALATION) at 08:36

## 2025-03-09 RX ADMIN — METHOCARBAMOL 1000 MG: 500 TABLET ORAL at 22:21

## 2025-03-09 RX ADMIN — HYDROMORPHONE HYDROCHLORIDE 0.4 MG: 1 INJECTION, SOLUTION INTRAMUSCULAR; INTRAVENOUS; SUBCUTANEOUS at 09:40

## 2025-03-09 RX ADMIN — CEFAZOLIN SODIUM 2 G: 2 INJECTION, SOLUTION INTRAVENOUS at 10:51

## 2025-03-09 RX ADMIN — OXYCODONE HYDROCHLORIDE 10 MG: 10 TABLET ORAL at 14:12

## 2025-03-09 RX ADMIN — ROSUVASTATIN CALCIUM 40 MG: 20 TABLET, FILM COATED ORAL at 20:58

## 2025-03-09 RX ADMIN — INSULIN LISPRO 10 UNITS: 100 INJECTION, SOLUTION INTRAVENOUS; SUBCUTANEOUS at 14:12

## 2025-03-09 RX ADMIN — MONTELUKAST 10 MG: 10 TABLET, FILM COATED ORAL at 20:58

## 2025-03-09 RX ADMIN — ASPIRIN 81 MG: 81 TABLET, COATED ORAL at 09:25

## 2025-03-09 RX ADMIN — HYDROMORPHONE HYDROCHLORIDE 0.2 MG: 1 INJECTION, SOLUTION INTRAMUSCULAR; INTRAVENOUS; SUBCUTANEOUS at 05:58

## 2025-03-09 RX ADMIN — DEXAMETHASONE SODIUM PHOSPHATE 10 MG: 4 INJECTION, SOLUTION INTRA-ARTICULAR; INTRALESIONAL; INTRAMUSCULAR; INTRAVENOUS; SOFT TISSUE at 18:16

## 2025-03-09 RX ADMIN — METHOCARBAMOL 1000 MG: 500 TABLET ORAL at 15:43

## 2025-03-09 RX ADMIN — INSULIN LISPRO 6 UNITS: 100 INJECTION, SOLUTION INTRAVENOUS; SUBCUTANEOUS at 18:24

## 2025-03-09 RX ADMIN — CEFAZOLIN SODIUM 2 G: 2 INJECTION, SOLUTION INTRAVENOUS at 18:14

## 2025-03-09 RX ADMIN — FLUTICASONE FUROATE AND VILANTEROL TRIFENATATE 1 PUFF: 200; 25 POWDER RESPIRATORY (INHALATION) at 08:36

## 2025-03-09 RX ADMIN — GUAIFENESIN 600 MG: 600 TABLET ORAL at 20:58

## 2025-03-09 RX ADMIN — INSULIN LISPRO 4 UNITS: 100 INJECTION, SOLUTION INTRAVENOUS; SUBCUTANEOUS at 09:46

## 2025-03-09 RX ADMIN — ACETAMINOPHEN 975 MG: 325 TABLET ORAL at 22:20

## 2025-03-09 ASSESSMENT — COGNITIVE AND FUNCTIONAL STATUS - GENERAL
DRESSING REGULAR UPPER BODY CLOTHING: TOTAL
HELP NEEDED FOR BATHING: TOTAL
DRESSING REGULAR LOWER BODY CLOTHING: TOTAL
PERSONAL GROOMING: A LOT
DAILY ACTIVITIY SCORE: 8
MOVING TO AND FROM BED TO CHAIR: TOTAL
CLIMB 3 TO 5 STEPS WITH RAILING: TOTAL
EATING MEALS: A LOT
MOBILITY SCORE: 7
TOILETING: TOTAL
WALKING IN HOSPITAL ROOM: TOTAL
TURNING FROM BACK TO SIDE WHILE IN FLAT BAD: TOTAL
MOVING FROM LYING ON BACK TO SITTING ON SIDE OF FLAT BED WITH BEDRAILS: A LOT
STANDING UP FROM CHAIR USING ARMS: TOTAL

## 2025-03-09 ASSESSMENT — PAIN SCALES - GENERAL
PAINLEVEL_OUTOF10: 7
PAINLEVEL_OUTOF10: 10 - WORST POSSIBLE PAIN
PAINLEVEL_OUTOF10: 6
PAINLEVEL_OUTOF10: 5 - MODERATE PAIN
PAINLEVEL_OUTOF10: 7
PAINLEVEL_OUTOF10: 9

## 2025-03-09 ASSESSMENT — ACTIVITIES OF DAILY LIVING (ADL): LACK_OF_TRANSPORTATION: NO

## 2025-03-09 ASSESSMENT — PAIN DESCRIPTION - DESCRIPTORS
DESCRIPTORS: DISCOMFORT;SHARP

## 2025-03-09 ASSESSMENT — PAIN - FUNCTIONAL ASSESSMENT
PAIN_FUNCTIONAL_ASSESSMENT: 0-10

## 2025-03-09 NOTE — PROGRESS NOTES
ORTHOPAEDIC SPINE SURGERY NOTE    S/p T9-11 posterior fusion, decompression; L2-3 laminectomy, dural repair     Post-Operative Plan:   - return to floor  - lay flat x 24 hours for dural tear  - ok to start HOB at 30 deg tomorrow evening  - regular diet  - hemovac x 1 - to remain at gravity suction at all times  - MARIAN drain x 1, set to normal bulb suction  - no dvt ppx x 72 hours  - ancef x 48 hours  - decadron x 24 hours    Marquise Pastor MD  Spine Surgery Fellow

## 2025-03-09 NOTE — PROGRESS NOTES
"Orthopaedic Surgery Progress Note    Subjective: Evaluated in immediate postoperative period. Pain well controlled considering recent surgery. Denies chest pain, shortness of breath, or fevers.    Objective:  /74   Pulse 104   Temp 36.5 °C (97.7 °F)   Resp (!) 29   Ht 1.676 m (5' 5.98\")   Wt 99.7 kg (219 lb 12.8 oz)   SpO2 97%   BMI 35.49 kg/m²     Gen: arousable, NAD, appropriately conversational  Cardiac: RRR to peripheral palpation  Resp: nonlabored on RA  GI: soft, nondistended    MSK:  HV x1 and MARIAN x1 in place    L1: SILT       L2: SILT      Hip flexors 2/5 Left; 3/5 Right  L3: SILT      Knee extension 2/5 Left; 3/5 Right  L4: SILT      Tib Ant. (Dorsiflexion) 5/5 Left; 5/5 Right  L5: SILT      EHL5/5 Left; 5/5 Right  S1: SILT      Plantar flexion 5/5 Left; 5/5 Right      Assessment/Plan: 69 y.o. female s/p T9-T11 PSIF and decompression, L2-3 laminectomy, dural repair on 3/8/25 with Dr. Murguia.      Plan:  - Weight-bearing status: HOB flat until 1800 3/9. Then progress to HOB 30 degrees  - Regular diet as tolerated, bowel regimen  - Multimodal pain, dexamethasone 10 mg IV q8h for 3 doses   - OT/PT  - Reyna-operative Ancef for 48 hours  - MARIAN x1 to suction, Hvx1 to gravity, Maintain drain, monitor and record output q8 hrs  - Kelley: None   - DVT ppx: SCDs, early mobility. May resume DVT ppx 72h postop (3/11 PM)    Dispo: Pending PT/pain control    Plan was discussed with attending Dr. Murguia    While admitted, this patient will be followed by the Ortho Spine Team, available via Epic Chat weekdays 6a-6p. Please page [phone] on nights and weekends.    Ortho Spine  First Call: John Colunga, PGY-2  Second Call: Alix Diaz, PGY-4  Third Call: Marquise Pastor, Fellow    John Colunga MD  Orthopedic Surgery, PGY-2  Available via Epic Chat    "

## 2025-03-09 NOTE — SIGNIFICANT EVENT
Patient examined post operatively. Doing well. MARIAN drain dressing with strike through, reinforced. Patient denies any new onset tingling numbness or weakness.     Exam:     MSK:  L1: Decreased sensation       L2: Decreased sensation      hip flexors 2/5 Left; 3/5 Right  L3: Decreased sensation      knee extension 4/5 Left; 4/5 Right  L4: Decreased sensation     tib Ant. (Dorsiflexion) 5/5 Left; 5/5 Right  L5: Decreased sensation    EHL5/5 Left; 5/5 Right  S1: Decreased sensation plantar flexion 5/5 Left; 5/5 Right        No ankle clonus, negative babinski    Kuldeep Umanzor MD  Orthopaedic Surgery, PGY2

## 2025-03-09 NOTE — SIGNIFICANT EVENT
.Rapid Response Nurse Note: RADAR alert: 7    Pager time: [phone]  Arrival time: 1503  Event end time: 152  Location: [address]  [] Triage by phone or secure messaging    Rapid response initiated by:  [] Rapid response RN [] Family [] Nursing Supervisor [] Physician   [x] RADAR auto page [] Sepsis auto-page [] RN [] RT   [] NP/PA [] Other:     Primary reason for call:   [] BAT [] New CPAP/BiPAP [] Bleeding [] Change in mental status   [] Chest pain [] Code blue [] FiO2 >/= 50% [] HR </= 40 bpm   [] HR >/= 130 bpm [] Hyperglycemia [] Hypoglycemia [x] RADAR    [] RR </= 8 bpm [] RR >/= 30 bpm [] SBP </= 90 mmHg [] SpO2 < 90%   [] Seizure [] Sepsis [] Shortness of breath  [] Staff concern: see comments     Initial VS and/or RADAR VS: T 36.5 °C; ; RR 21; /78; SPO2 96%.    Provider Notification: Provider Notification: Attending Dr. Crawford    Interventions:  [] None [] ABG/VBG [] Assist w/ICU transfer [] BAT paged    [] Bag mask [] Blood [] Cardioversion [] Code Blue   [] Code blue for intubation [] Code status changed [] Chest x-ray [x] EKG   [] IV fluid/bolus [] KUB x-ray [] Labs/cultures [] Medication   [] Nebulizer treatment [] NIPPV (CPAP/BiPAP) [] Oxygen [] Oral airway   [] Peripheral IV [] Palliative care consult [] CT/MRI [] Sepsis protocol    [] Suctioned [] Other:     Outcome:  [] Coded and  [] Code blue for intubation [] Coded and transferred to ICU []  on division   [x] Remained on division (no change) [] Remained on division + additional monitoring [] Remained in ED [] Transferred to ED   [] Transferred to ICU [] Transferred to inpatient status [] Transferred for interventions (procedure) [] Transferred to ICU stepdown    [] Transferred to surgery [] Transferred to telemetry [] Sepsis protocol [] STEMI protocol   [] Stroke protocol [x] Bedside nurse instructed to page rapid response for any concerns or acute change in condition/VS     Additional Comments:  Upon arrival patient found  lying supine, HOB flat. Patient states her post op pain is a little better, denies feeling her heart racing.   Dr. Crawford notified of tachycardia, 12 lead ECG obtained. IV fluids initiated as ordered, no need for telemetry monitoring at this time per Dr. Crawford.  Nursing to contact rapid Response Team with any further issues or patient deterioration.

## 2025-03-09 NOTE — PROGRESS NOTES
03/09/25 1602   Discharge Planning   Living Arrangements Spouse/significant other;Children   Support Systems Spouse/significant other;Children   Assistance Needed total ADL care   Type of Residence Private residence   Who is requesting discharge planning? Other (Comment)  (initial TCC assessment)   Home or Post Acute Services Post acute facilities (Rehab/SNF/etc)   Type of Post Acute Facility Services Skilled nursing   Expected Discharge Disposition SNF   Does the patient need discharge transport arranged? Yes   RoundTrip coordination needed? Yes   Financial Resource Strain   How hard is it for you to pay for the very basics like food, housing, medical care, and heating? Not hard   Housing Stability   In the last 12 months, was there a time when you were not able to pay the mortgage or rent on time? N   In the past 12 months, how many times have you moved where you were living? 0   At any time in the past 12 months, were you homeless or living in a shelter (including now)? N   Transportation Needs   In the past 12 months, has lack of transportation kept you from medical appointments or from getting medications? no   In the past 12 months, has lack of transportation kept you from meetings, work, or from getting things needed for daily living? No     TCC ASSESSMENT:  Met with pt and introduced myself as care coordinator and member of the Care Transitions team for discharge planning. Pt lives at home with her  and son. Pt stated she has not been able to walk for the past 3 years. Pt's  and son provides total ADL assistance, she has 24/7 assistance at home except when  is running errands. Pt stated for the most part she stays in bed,  transfers her to the rollator and wheel her to the bathroom when needed, + car when transporting. Pt denies having any wounds or bed sores. Pt denies any falls. Pt stated she feels safe at home. Pt's address, phone number, and emergency contact information  was verified. Pt denies any social or financial concerns at this time.    Home care: none.  DME: rollator, glucometer + supplies.  : none.  PCP: Dr. Coretta Puentes MD   Last appt: Pt stated she missed her appt prior to admission.   Transport to appts:  provides.  Pharmacy: [facility] (denies issues affording/obtaining medications).    Discharge Planning: Pt stated she most likely will need rehab after surgery. She was at a SNF in [place] last year and prefers [place] if placement is needed. Care coordinator will continue to follow for discharge planning needs.    Andrew Jacobo RN  Transitional Care Coordinator (TCC)  [phone] or [phone]

## 2025-03-09 NOTE — OP NOTE
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]     * Thoracic spinal stenosis [M48.04]     Procedures  T9-10, T10-11 laminectomy and medial facetectomies  T9, T10, T11 posterior spinal fusion  T9, T10, T11 posterior instrumentation  L2-3 laminectomy and medial facetectomies  Use of morselized autograft local bone for fusion and morselized allograft bone chips and demineralized bone matrix (Ossifuse) for bone graft augmentation  Use of stereotactic computer-assisted navigation  Placement of incisional wound VAC (Prevena - 35 cm)  Repair of dura (onlay graft - Duragen and Duraseal glue)    Modifier 22: a modifier 22 billing dose is being applied to this case to indicate that this surgery required significantly more work, time, and complexity than usual. This was secondary to the patient's large body habitus/obesity and complex anatomy with ossified ligamentum flavum that was significantly scarred to her dura requiring prolonged operative time to carefully peel the pathology off of the patient's spine in two different locations in her thoracic and lumbar spine.       Surgeons      * Arun Murguia MD - Primary    Resident/Fellow/Other Assistant:      * Marquise Pastor MD - Fellow - Assisting    No qualified orthopaedic surgery resident was available for the case. The assistance of Dr. Pastor's expertise was needed for positioning, exposure, decompression, instrumentation, fusion, and closure. It could not have been performed in the same capacity without an assistant of their caliber.       Procedure Summary  Anesthesia: General  ASA: III  Anesthesia Staff: Anesthesiologist: Juan RUIZ  DO Roger  C-AA: MICHAEL Mendoza; MICHAEL Muniz  DUANE: Jayme Lai  Estimated Blood Loss: 500 mL  Specimen:   ID Type Source Tests Collected by Time   A :  Blood Blood, Arterial BLOOD GAS ARTERIAL FULL PANEL Arnu ELIZALDE MD 3/8/2025 1428   B :  Blood Blood, Arterial BLOOD GAS ARTERIAL FULL PANEL Arun ELIZALDE MD 3/8/2025 1523   C :  Blood Blood, Arterial BLOOD GAS ARTERIAL FULL PANEL Arun ELIZALDE MD 3/8/2025 1733        Staff:   Circulator: Blas Mckinley Person: Daya  Circulator: Shanti Garcia Circulator: Uma Garcia Scrub: Fercho    Implants:  NuVasive Reline instrumentation  10 ml Ossifuse DBM  90 ml allograft chips    Implant Name Type Inv. Item Serial No.  Lot No. LRB No. Used Action   BONE, CHIPS, CANCELLOUS, ASEPTIC,90 CC - [id number] - [id number] Graft BONE, CHIPS, CANCELLOUS, ASEPTIC,90 CC [id number] MUSCULOSKELETAL TRNSPLNT FNDN [id number] N/A 1 Implanted   BONE GRAFT PUTTY OSSIFUSE FLOWABLE FIBER 10CC - [id number] - [id number] Graft BONE GRAFT PUTTY OSSIFUSE FLOWABLE FIBER 10CC [id number] [employer] [id number] N/A 1 Implanted   SCREW, RELINE-O, 6.5X45MM 2S POLYAXIAL - [id number] Screw SCREW, RELINE-O, 6.5X45MM 2S POLYAXIAL  [employer]  N/A 6 Implanted   SCREW, RELINE LOCK, 5.5MM OPEN TULIP - [id number] Neuro Interventional Implant SCREW, RELINE LOCK, 5.5MM OPEN TULIP  [employer]  N/A 6 Implanted   CARTER, RELINE-O, 5.5 X 70MM, LORDOTIC - [id number] Screw CARTER, RELINE-O, 5.5 X 70MM, LORDOTIC  [employer]  N/A 2 Implanted   GRAFT MATRIX, DURAL, DURAGEN PLUS 1X1 - [id number] Graft GRAFT MATRIX, DURAL, DURAGEN PLUS 1X1  [employer]:NEUROSCI [id number] N/A 1 Implanted   GRAFT MATRIX, DURAL, DURAGEN PLUS 1X1 - [id number] Graft GRAFT MATRIX, DURAL, DURAGEN PLUS 1X1  [employer]:NEUROSCI [id number] N/A 1 Implanted       Findings: Severe thoracic stenosis from T9-11 with hypertrophic and partially ossified ligamentum flavum with  significant scarring and adherence to the underlying dura. Secondarily, severe lumbar stenosis at L2-3 with hypertrophic and partially ossified ligamentum flavum with significant scarring and adherence to the underlying dura with small focus of dural deficiency after excision of ligamentum flavum requiring onlay repair      Indications: Abbie Mayo is a 69 y.o. female who presented as a transfer from [facility] with progressive bilateral lower extremity weakness, numbness, and urinary incontinence.  Patient's symptoms have been ongoing and progressive for the last 6 months.  She did relate an episode where she fell approximately 3 days prior to her presentation after which she developed progressive worsening of her weakness with inability to stand up or ambulate.  At baseline she does use a wheeled walker and wheelchair and requires significant assistance with ADLs, although she does not live alone and is relatively independent with most ADLs.  She receives quite a bit of assistance from her son's.  Over the last 6 months she has noted significant functional deficits in her capacity to perform ADLs requiring further assistance from her family.    When I met with her in the hospital, she reported severe weakness with bilateral lower extremities that have been a chronic consideration for several months.  She does report chronic saddle anesthesia along with gait instability.  She was of note admitted to [facility] back in October 2024 with neurosurgery consultation.  She was actually scheduled to proceed with thoracic and lumbar decompression with fusion, however this was canceled due to preoperative clearance considerations.  She has a history of type 2 diabetes melitis with A1c last measured at 11% in March 2025.  She also has severe asthma.  Her BMI is 35.5.    On my examination, she graded roughly 2-3 out of 5 in bilateral IP.  Her right quadriceps was a 3 out of 5 in her left quadriceps was a 4 out of 5.  Her  left tib ant, EHL graded 4 out of 5 in her right tib ant, EHL graded 3 out of 5.  Her bilateral gastrocnemius graded 5 out of 5.  She had diffuse numbness in bilateral lower extremities.  She exhibited no hyperreflexia or sustained clonus.  Per resident evaluation, she did have a rectal tone that was a bit weaker.    Evaluation of outside imaging, CT and MRI was available.  On MRI, patient demonstrated severe thoracic stenosis with cord compression and cord signal change at T9-10 and T10-11.  There is also severe lumbar stenosis centrally at L2-3.  This was secondary to ligamentum flavum hypertrophy at all 3 segments.  On CT, patient demonstrated vacuum disc effect indicating micromotion with spondylotic change at T9-10 and T10-11 segments.  There was evidence of partial OLF at the T9-10, T10-11, and L2-3 segments.  There was superior endplate changes noted at the L2 vertebral body favored to be a Schmorl's node over a fracture.    I had an extensive discussion with the patient in the hospital with regards to her symptoms, her imaging findings, and possible management options.  We discussed how her progressive bilateral lower extremity weakness, numbness, and saddle anesthesia with urinary incontinence progressive over the past 6 months was likely secondary to severe spinal cord compression from T9-11 with OLF and ligamentum flavum hypertrophy with spondylotic micromotion in addition to severe L2-3 lumbar stenosis secondary to ligamentum flavum hypertrophy and partial on the left as well.  Patient's condition was suggestive of progressive thoracic myelopathy in the setting of severe lumbar stenosis as well.  We discussed management options, including continued nonoperative management with serial examination.  Given the severity of her condition with progressive weakness, we did discuss operative management.  We discussed a thoracic decompression and instrumented fusion, T9-11.  Additionally, we discussed addressing  her lumbar stenosis at L2-3.    The risks, benefits, complications, treatment options, non-operative alternatives, expected recovery and outcomes were discussed with the patient. The possibilities of reaction to medication, pulmonary aspiration, injury to surrounding structures, bleeding, recurrent infection, the need for additional procedures, failure to diagnose a condition, and creating a complication requiring transfusion or operation were discussed with the patient. I discussed the risks of surgery which includes but not limited to infection at the surgical site or wound healing requiring additional surgery to clean the infection and long term IV antibiotics. There is risk of blood loss requiring blood transfusion. Risk of dural tear requiring repair and possible continue cerebral spinal fluid leakage and positional headaches. Risk of nerve root injury resulting in temporary or permeant weakness, numbness, or radicular pain such as a nerve root palsy. Risk of epidural hematoma and compression resulting in weakness in extremity and bowel and bladder disturbances requiring additional surgery to clear out hematoma. Risk of nonunion or hardware failure requiring additional surgery to correct this. Risk of adjacent level disease in the future requiring additional surgery in the future to address this. Risk of needing to remain intubated after surgery for facial swelling. Risk of dysphagia or difficulty swallowing requiring feeding tube briefly. Risk of blindness if need to be in a prone position for surgery. Other complications include skin breakdown or skin blistering from being prone for long hours, developing MI, stroke, DVT, PE during or after surgery. Risk of death. Risk of complications from anesthesia requiring prolonged intubation.     Specifically with regards to her considerations, we discussed the elevated perioperative risk of infection and wound healing that she is at given her uncontrolled diabetes in  addition to her obesity.  However, given the severity of her functional decline, we did discuss proceeding with this with the understanding that she is at heightened risk.  We also did discuss the elevated risk of CSF leak that she is at due to her old left.  We also discussed the nature of thoracic myelopathy surgery and the high risk of postoperative exacerbation of weakness.  We will be treating her perioperatively with IV steroids, however we again discussed how this may throw her blood sugar control into imbalance.  We will have to continue titrating things carefully moving forward in conjunction with the primary internal medicine team in order to balance her spinal cord function and recovery with her blood glucose control.     The patient  concurred with the proposed plan, giving informed consent. The site of surgery was properly noted/marked if necessary per policy.         Procedure Details: Patient was identified in the preoperative area, consent reviewed, operative area marked. Taken to the operating room where general anesthesia was induced. SCDs were placed on the lower extremities.  Kelley placed in standard sterile fashion.  Neuromonitoring leads for SSEP and MEP signals were placed.  Prepositioning baselines were obtained for both SSEP and MEP signals.  Patient was then carefully rolled prone on an open William frame with the abdomen hanging free.  All bony prominences were well padded.  Repeat post-positioning signals for SSEP and MEP were obtained which were stable from prepositioning.    The patient was then prepped and draped in normal sterile fashion. They received preoperative Ancef for prophylactic antibiosis within 30 minutes of the incision. Time-out was performed and all in the room were in agreement.    We began by taking fluoroscopic images to help guide our incision.  We base this off of the last vertebral segment with a rib, which was confirmed to be the T12 vertebra based off of  preoperative imaging.  We planned our incision approximately from T9-11. The incision was carried through skin, subcutaneous tissue, and fascia.  The incision was then carried out over the operative levels with subperiosteal dissection out to the tips of the transverse processes, T9-T11.     We then proceeded with our intraoperative O-arm navigation.  The navigation array was clamped onto what we believed to be the T9 spinous process and positioned so as to not interfere with the subsequent instrumentation.  The O-arm was then carefully brought into the room and positioned around the patient respecting the sterility of the field.  Biplanar spot shot fluoroscopic images were obtained to ensure an appropriate field-of-view.  The O-arm was then spun and images uploaded into the navigation system.  The O-arm was then carefully opened and backed away from the field.  We ensured to obtain imaging both in the lower thoracic region as well as throughout the lumbar region in order to confirm our levels as well as to guide our lumbar decompression.    We began by confirming her levels.  We used the turkey foot navigator to confirm our levels of exposure from T9-11 on the O-arm scans, referencing off of the T12 rib vertebra in addition to bony landmarks and counting off of the sacrum.    Once levels were confirmed, we then began our instrumentation.  We first used the turkey foot navigator to identify an appropriate start point for the pedicle screw.  We used a high-speed concepcion to create a start hole.  We then used navigated 4.5-mm tap in order to create the trajectory for the screw. Screws were inserted with excellent insertional torque. We placed 45 x 6.5-mm screws at T9 bilaterally. We repeated the above processes to place the same sized screws at T10 and T11.     We then proceeded with our decompression. A Leksell rongeur was then used to remove the spinous processes of T9, T10, and the cephalad aspect of T11. We thinned  down the dorsal cortex at T9 and then carefully entered the spinal canal, identifying the plane between the ligamentum flavum and lateral edge of bone. There was significantly hypertrophic ligament which was partially ossified. We carefully used a combination of curette with pituitary rongeur tensioning to slowly and carefully peel the ligamentum flavum off of the dorsal dura. These was considerable scarring and adherence which made this step very difficult. Kerrison rongeurs were used to resect the remaining ligamentum flavum and undercutting the T9-10 joint laterally, performing our medial facetectomies. Once the T9-10 decompression was completed, we thinned down the dorsal cortex at T10 and then carefully entered the spinal canal, identifying the plane between the ligamentum flavum and lateral edge of bone. We carefully resected the remaining cepahalad aspect of the T10 lamina proceeding in a caudal direction to initiate our T10-11 decompression. Again significantly thickened and ossified flavum was identified at T10. We carefully used a combination of curette with pituitary rongeur tensioning to slowly and carefully peel the ligamentum flavum off of the dorsal dura. These was considerable scarring and adherence which made this step again very difficult. Kerrison rongeurs were used to resect the remaining ligamentum flavum and undercutting the T10-11 joint laterally, performing our medial facetectomies. There was significant and appropriate dural expansion after the decompression. MEP and SSEP signals were confirmed to be stable throughout the decompression from pre- and post-position baselines. Hemostasis was then achieved with a combination of gelfoam and bipolar electrocautery.     At this point, the thoracic neurologic decompression was complete. 2 rods were placed from T9 to T11. Set screws were engaged and tightened down to their final torque. Excellent rigid fixation was achieved and was verified. We then  carefully decorticated the dorsolateral elements from T9 to T11 along with the intervening joints. The wound was irrigated with Irrisept and normal saline solution. Hemostasis was achieved. We then placed local bone and allograft chips supplemented with Ossifuse DBM, thereby completing the posterior spinal fusion.     We then packed this wound and proceeded with our L2-3 decompression. The turkey foot navigator was utilized to count off of our identified levels to identify an appropriate separate skin incision overlying the L2-3 segment. Once this incision was confirmed, it was carried through skin, subcutaneous tissue, and fascia. The L2 spinous process was identified and confirmed with the turkey foot navigator. We then subperiosteally dissected out the L2 lamina and out to the medial aspects of the L2-3 facet joints. Again, the turkey foot navigator was used to confirm the joint level in relation to several bony landmarks, including the sacrum (which was visible on our O-arm scans), T12 ribbed vertebra, and our instrumented construct from T9-11.     We then commenced with our L2-3 decompression. The L2 spinous process was removed with a Leksell rongeur in addition to the cephalad aspect of the L3 bony laminar arch. The dorsal cortex at the L2 lamina was thinned with a combination of Leksell rongeur and high-speed concepcion. Ligamentum flavum was identified. The cephalad aspect of flavum was identified with epidural fat. Again, the flavum here was significantly thickened and partially ossified. We used curettage to carefully lift the flavum. The flavum here was significantly thickened and it was very difficult to identify the underlying dural plane. We debulked the flavum dorsally with a combination of Leksell rongeur and Kerrison rongeur. With careful curette work, we ultimately identified the plane cephalad between the flavum and the dura. We stayed dorsal to the flavum and undercut the L2-3 joints laterally with  Kerrison ronguers performing our medial facetectomy. We then proceeded with resecting the flavum working cephalad to caudad. We split the remaining ligamentum in its midline. Identifying the plane between flavum and dura, the flavum was resected caudally and then out laterally out to the joints on either side. As we proceeded near the caudal aspect of the L2 lamina and near the cephalad L3 laminar edge, the dura here was significantly adherent to the flavum. We used a curette to carefully lift the ligamentum off of the dura. At the caudal aspect, there was a spot where the flavum was completely sealed to the dura. When lifting this off, a small dural deficiency was encountered deep to the resected flavum that was unavoidable, given the extent of scarring. There was a small arachnoid bleb with very trace leakage of cerebrospinal fluid. We placed a small cottonoid over this to protect the dural deficiency. We then removed the remaining ligamentum and undercut the cephalad L3 laminar bridge completing our laminectomy and medial facetectomies at L2-3. There was significant dural expansion after this with visible decompression.     We then turned our attention to the dural deficiency. Again, this was a very small (subcentimeter) arachnoid bleb with very trace leakage of cerebrospinal fluid. We decided that attempting a suture repair of this small deficiency would introduce more trauma and would risk the bleb transitioning into a full thickness dural leak. We opted to repair this with an onlay graft. We first irrigated the wound with Irrisept and normal saline. We first sprayed a small amount of Duraseal sealant over the exposed dura. We then placed a 1 x 1 cm only Duragen graft to cover the bed. We then sprayed a second small smount of Duraseal over the onlay graft to complete the repair. There was no leakage of cerebrospinal fluid from around the sealant.     We then achieved hemostasis from the bony edges and the  surrounding soft tissues. We then removed the packing from our thoracic wound and confirmed our hemostasis. We then placed 2 g of vancomycin powder total between the two wound beds. We placed a 10-Fr flat MARIAN drain in the thoracic wound to be placed under suction. We placed a 10-Fr round Hemovac drain in the lumbar wound to be placed to gravity. We then routinely closed the incisions in layers, using 1-Stratafix and 0-vicryl for fascia, 2-0 vicryl for subcutaneous tissue, and 2-0 nylon for skin. The wound was dressed with a Prevena incisional wound VAC system. Upon closure, the patient was then transferred to a hospital bed, taken to recovery after extubation in stable condition.        Complications:   Dural deficiency at L2-3 secondary to significantly adherent/scarred partially ossified ligamentum flavum, repaired with onlay Duragen and Duraseal with no post-repair leakage      Disposition: PACU - hemodynamically stable.  Condition: stable     Additional Details: None     Attending Attestation: I was present and scrubbed for the entire procedure., save for superficial closure.     --    Arun Murguia MD  Orthopaedic Spine Surgery  , Department of Orthopaedic Surgery  [facility]

## 2025-03-09 NOTE — CARE PLAN
The clinical goals for the shift include Patient will verbalize pain control at a eight out of ten or below by 1900 on 03/09/25

## 2025-03-09 NOTE — PROGRESS NOTES
"Physical Therapy                 Therapy Communication Note    Patient Name: Abbie Mayo  MRN: [id number]  Department: [place] [address]  Room: 5555557-A  Today's Date: 3/9/2025     Discipline: Physical Therapy          Missed Visit Reason: Missed Visit Reason: Patient placed on medical hold    Missed Time: Attempt    Comment:  PT eval orders received \"(Pt on bedrest 24 hours due to dural tear. OK for HOB elevation evening of 3/9. Will reattempt PT eval 3/10 once bedrest is completed.\"  "

## 2025-03-09 NOTE — PROGRESS NOTES
Ms. Abbie Mayo is a 69 y.o. female who is on hospital day #2 for No chief complaint on file..      Assessment/Plan     Ms. Mayo is a 68y/o lady with history of T2DM (A1C 10.6%, 9/2024), HTN, asthma, and chronic thoracic spinal stenosis c/b LE weakness and urinary incontinence who presented due to acute worsening of LE weakness. S/p T9-11 laminectomy with PSIF with Dr. Murguia (3/8).     # Thoracic spinal stenosis  # LE weakness  # Urinary incontinence  Patient with 6mo history of slowly worsening back pain and LE weakness. Patient started developing urinary incontinence and was evaluated by neurosurgery on 8/2024. She was planned for surgery several times, but was unable to procedure due to an asthma exacerbation and then uncontrolled diabetes.   - MRI from [facility] with severe thoracic stenosis with cord compression and cord signal change at T9-10 and T10-11   - s/p IV dexamethasone 10mg q8hr x3 doses, added NPH 20U q8hr to mitigate steroid hyperglycemia  - Ortho spine consulted, s/p T9-11 laminectomy with PSIF with Dr. Murguia (3/8)  - Pain regimen: scheduled tylenol and robaxin 1000mg q8hr; oxy 10mg q4hr prn and IV dilaudid 0.4mg for breakthrough  - PT/OT consulted    # T2DM  A1C 10.6 (9/2024). Home regimen is lantus 48U at bedtime + lispro 20U TIDAC with metformin. Sugars not well-controlled.  - Continue lantus 40U at bedtime + lispro 10U TIDAC     # HTN - continue home losartan, hold home verapamil   # Asthma - continue home Breo + montelukast    CORE MEASURES  N: diabetic diet  P: subcutaneous lovenox  C: Full code, confirmed  HCP: Jorge, spouse [phone]   Dispo: pending PT/OT    55 minutes were spent on patient care, chart review, and discussion with the care team and family.    Genevieve Crawford MD      Subjective    Patient states her pain is well-controlled except intermittently she gets severe muscle spasms in her spine that radiate to her lower abdomen.     Objective    Vitals:    03/09/25  1517   BP: 123/76   Pulse: (!) 133   Resp: 22   Temp: 37.6 °C (99.7 °F)   SpO2: 94%        Intake/Output Summary (Last 24 hours) at 3/9/2025 1548  Last data filed at 3/9/2025 1144  Gross per 24 hour   Intake 2130 ml   Output 1940 ml   Net 190 ml        GEN: well-appearing, no acute distress  HEENT: moist mucous membranes, no scleral icterus  NECK: Supple  CV: RRR, no murmurs/rubs/gallops  PULM: Breathing comfortably, clear to auscultation bilaterally  AB: Soft, non-tender, non-distended  : No in-dwelling naqvi  MSK: No lower extremity edema bilaterally, MARIAN drain and HV with serosanguinous drainage  NEURO: A&Ox3, following commands, conversational    LABS AND STUDIES  Relevant labs and studies independently reviewed and interpreted.    MEDICATIONS  acetaminophen, 975 mg, oral, q6h  albuterol, 2.5 mg, nebulization, q6h  aspirin, 81 mg, oral, Daily  ceFAZolin, 2 g, intravenous, q8h  dexAMETHasone, 10 mg, intravenous, q8h  [START ON 3/11/2025] enoxaparin, 40 mg, subcutaneous, q24h DG  fluticasone furoate-vilanteroL, 1 puff, inhalation, Daily  gabapentin, 300 mg, oral, BID  guaiFENesin, 600 mg, oral, BID  insulin glargine, 40 Units, subcutaneous, Nightly  insulin lispro, 0-10 Units, subcutaneous, TID AC  insulin lispro, 10 Units, subcutaneous, TID AC  insulin NPH (Isophane), 20 Units, subcutaneous, q8h  lactated Ringer's, 1,000 mL, intravenous, Once  latanoprost, 1 drop, Right Eye, Nightly  losartan, 100 mg, oral, Daily  methocarbamol, 1,000 mg, oral, q8h DG  montelukast, 10 mg, oral, Nightly  rosuvastatin, 40 mg, oral, Nightly  [Held by provider] verapamil, 120 mg, oral, TID

## 2025-03-09 NOTE — ANESTHESIA POSTPROCEDURE EVALUATION
Patient: bAbie Mayo    Procedure Summary       Date: 03/08/25 Room / Location: [facility] OR 08 / [facility]    Anesthesia Start: 1340 Anesthesia Stop: 1909    Procedure: T9-11 laminectomy and PSIF with navigation (Bilateral: Spine Thoracic) Diagnosis:       Thoracic myelopathy      Ossification of ligamentum flavum      Thoracic spinal stenosis      (Thoracic myelopathy [M47.14])      (Ossification of ligamentum flavum [M24.28])      (Thoracic spinal stenosis [M48.04])    Surgeons: Arun ELIZALDE MD Responsible Provider: Juan Duran DO    Anesthesia Type: general ASA Status: 3            Anesthesia Type: general    Vitals Value Taken Time   /84 03/08/25 2100   Temp 36.3 °C (97.3 °F) 03/08/25 2015   Pulse 115 03/08/25 2103   Resp 24 03/08/25 2025   SpO2 92 % 03/08/25 2103   Vitals shown include unfiled device data.    Anesthesia Post Evaluation    Patient location during evaluation: PACU  Patient participation: complete - patient participated  Level of consciousness: awake  Pain management: adequate  Airway patency: patent  Cardiovascular status: acceptable  Respiratory status: acceptable  Hydration status: acceptable  Postoperative Nausea and Vomiting: none        There were no known notable events for this encounter.

## 2025-03-09 NOTE — CARE PLAN
The patient's goals for the shift include      The clinical goals for the shift include Patient will remain HDS throughout shift      Problem: Pain - Adult  Goal: Verbalizes/displays adequate comfort level or baseline comfort level  Outcome: Progressing     Problem: Safety - Adult  Goal: Free from fall injury  Outcome: Progressing     Problem: Discharge Planning  Goal: Discharge to home or other facility with appropriate resources  Outcome: Progressing     Problem: Chronic Conditions and Co-morbidities  Goal: Patient's chronic conditions and co-morbidity symptoms are monitored and maintained or improved  Outcome: Progressing     Problem: Nutrition  Goal: Nutrient intake appropriate for maintaining nutritional needs  Outcome: Progressing     Problem: Skin  Goal: Decreased wound size/increased tissue granulation at next dressing change  Outcome: Progressing  Goal: Participates in plan/prevention/treatment measures  Outcome: Progressing  Goal: Prevent/manage excess moisture  Outcome: Progressing  Goal: Prevent/minimize sheer/friction injuries  Outcome: Progressing  Goal: Promote/optimize nutrition  Outcome: Progressing  Goal: Promote skin healing  Outcome: Progressing     Problem: Diabetes  Goal: Achieve decreasing blood glucose levels by end of shift  Outcome: Progressing  Goal: Increase stability of blood glucose readings by end of shift  Outcome: Progressing  Goal: Decrease in ketones present in urine by end of shift  Outcome: Progressing  Goal: Maintain electrolyte levels within acceptable range throughout shift  Outcome: Progressing  Goal: Maintain glucose levels >70mg/dl to <250mg/dl throughout shift  Outcome: Progressing  Goal: No changes in neurological exam by end of shift  Outcome: Progressing  Goal: Learn about and adhere to nutrition recommendations by end of shift  Outcome: Progressing  Goal: Vital signs within normal range for age by end of shift  Outcome: Progressing  Goal: Increase self care and/or  family involovement by end of shift  Outcome: Progressing  Goal: Receive DSME education by end of shift  Outcome: Progressing

## 2025-03-09 NOTE — PROGRESS NOTES
Occupational Therapy                 Therapy Communication Note    Patient Name: Abbie Mayo  MRN: [id number]  Department: [place] [address]  Room: 5557/5557-A  Today's Date: 3/9/2025     Discipline: Occupational Therapy    OT Missed Visit: Yes     Missed Visit Reason: Missed Visit Reason: Patient placed on medical hold (Pt on bedrest 24 hours due to dural tear. OK for HOB elevation evening of 3/9. Will reattempt OT eval 3/10 once bedrest is completed.)    Missed Time: Attempt    Brooklynn Bhandari OT  3/9/2025

## 2025-03-10 ENCOUNTER — APPOINTMENT (OUTPATIENT)
Dept: RADIOLOGY | Facility: HOSPITAL | Age: 70
DRG: 029 | End: 2025-03-10
Payer: MEDICARE

## 2025-03-10 LAB
ALBUMIN SERPL BCP-MCNC: 3.5 G/DL (ref 3.4–5)
ANION GAP SERPL CALC-SCNC: 11 MMOL/L (ref 10–20)
BUN SERPL-MCNC: 20 MG/DL (ref 6–23)
CALCIUM SERPL-MCNC: 8.4 MG/DL (ref 8.6–10.6)
CHLORIDE SERPL-SCNC: 106 MMOL/L (ref 98–107)
CO2 SERPL-SCNC: 25 MMOL/L (ref 21–32)
CREAT SERPL-MCNC: 1.06 MG/DL (ref 0.5–1.05)
EGFRCR SERPLBLD CKD-EPI 2021: 57 ML/MIN/1.73M*2
ERYTHROCYTE [DISTWIDTH] IN BLOOD BY AUTOMATED COUNT: 13.2 % (ref 11.5–14.5)
GLUCOSE BLD MANUAL STRIP-MCNC: 150 MG/DL (ref 74–99)
GLUCOSE BLD MANUAL STRIP-MCNC: 167 MG/DL (ref 74–99)
GLUCOSE BLD MANUAL STRIP-MCNC: 194 MG/DL (ref 74–99)
GLUCOSE BLD MANUAL STRIP-MCNC: 265 MG/DL (ref 74–99)
GLUCOSE BLD MANUAL STRIP-MCNC: 274 MG/DL (ref 74–99)
GLUCOSE BLD MANUAL STRIP-MCNC: 349 MG/DL (ref 74–99)
GLUCOSE BLD MANUAL STRIP-MCNC: 375 MG/DL (ref 74–99)
GLUCOSE SERPL-MCNC: 291 MG/DL (ref 74–99)
HCT VFR BLD AUTO: 31.7 % (ref 36–46)
HGB BLD-MCNC: 10.2 G/DL (ref 12–16)
MAGNESIUM SERPL-MCNC: 1.83 MG/DL (ref 1.6–2.4)
MCH RBC QN AUTO: 27.3 PG (ref 26–34)
MCHC RBC AUTO-ENTMCNC: 32.2 G/DL (ref 32–36)
MCV RBC AUTO: 85 FL (ref 80–100)
NRBC BLD-RTO: 0 /100 WBCS (ref 0–0)
PHOSPHATE SERPL-MCNC: 3.3 MG/DL (ref 2.5–4.9)
PLATELET # BLD AUTO: 198 X10*3/UL (ref 150–450)
POTASSIUM SERPL-SCNC: 4 MMOL/L (ref 3.5–5.3)
RBC # BLD AUTO: 3.73 X10*6/UL (ref 4–5.2)
SODIUM SERPL-SCNC: 138 MMOL/L (ref 136–145)
WBC # BLD AUTO: 13.5 X10*3/UL (ref 4.4–11.3)

## 2025-03-10 PROCEDURE — 2500000001 HC RX 250 WO HCPCS SELF ADMINISTERED DRUGS (ALT 637 FOR MEDICARE OP): Performed by: STUDENT IN AN ORGANIZED HEALTH CARE EDUCATION/TRAINING PROGRAM

## 2025-03-10 PROCEDURE — 2500000004 HC RX 250 GENERAL PHARMACY W/ HCPCS (ALT 636 FOR OP/ED): Performed by: STUDENT IN AN ORGANIZED HEALTH CARE EDUCATION/TRAINING PROGRAM

## 2025-03-10 PROCEDURE — 85027 COMPLETE CBC AUTOMATED: CPT | Performed by: STUDENT IN AN ORGANIZED HEALTH CARE EDUCATION/TRAINING PROGRAM

## 2025-03-10 PROCEDURE — 97530 THERAPEUTIC ACTIVITIES: CPT | Mod: GO

## 2025-03-10 PROCEDURE — 80069 RENAL FUNCTION PANEL: CPT | Performed by: STUDENT IN AN ORGANIZED HEALTH CARE EDUCATION/TRAINING PROGRAM

## 2025-03-10 PROCEDURE — 97530 THERAPEUTIC ACTIVITIES: CPT | Mod: GP

## 2025-03-10 PROCEDURE — 2500000004 HC RX 250 GENERAL PHARMACY W/ HCPCS (ALT 636 FOR OP/ED): Mod: TB | Performed by: STUDENT IN AN ORGANIZED HEALTH CARE EDUCATION/TRAINING PROGRAM

## 2025-03-10 PROCEDURE — 1200000002 HC GENERAL ROOM WITH TELEMETRY DAILY

## 2025-03-10 PROCEDURE — 97161 PT EVAL LOW COMPLEX 20 MIN: CPT | Mod: GP

## 2025-03-10 PROCEDURE — 2500000002 HC RX 250 W HCPCS SELF ADMINISTERED DRUGS (ALT 637 FOR MEDICARE OP, ALT 636 FOR OP/ED): Performed by: STUDENT IN AN ORGANIZED HEALTH CARE EDUCATION/TRAINING PROGRAM

## 2025-03-10 PROCEDURE — 83735 ASSAY OF MAGNESIUM: CPT | Performed by: STUDENT IN AN ORGANIZED HEALTH CARE EDUCATION/TRAINING PROGRAM

## 2025-03-10 PROCEDURE — 94640 AIRWAY INHALATION TREATMENT: CPT

## 2025-03-10 PROCEDURE — 99233 SBSQ HOSP IP/OBS HIGH 50: CPT | Performed by: STUDENT IN AN ORGANIZED HEALTH CARE EDUCATION/TRAINING PROGRAM

## 2025-03-10 PROCEDURE — 36415 COLL VENOUS BLD VENIPUNCTURE: CPT | Performed by: STUDENT IN AN ORGANIZED HEALTH CARE EDUCATION/TRAINING PROGRAM

## 2025-03-10 PROCEDURE — 82947 ASSAY GLUCOSE BLOOD QUANT: CPT

## 2025-03-10 PROCEDURE — 97165 OT EVAL LOW COMPLEX 30 MIN: CPT | Mod: GO

## 2025-03-10 RX ORDER — INSULIN LISPRO 100 [IU]/ML
10 INJECTION, SOLUTION INTRAVENOUS; SUBCUTANEOUS ONCE
Status: COMPLETED | OUTPATIENT
Start: 2025-03-10 | End: 2025-03-10

## 2025-03-10 RX ORDER — ACETAMINOPHEN 500 MG
1000 TABLET ORAL EVERY 6 HOURS
Qty: 240 TABLET | Refills: 0 | Status: SHIPPED | OUTPATIENT
Start: 2025-03-10 | End: 2025-04-09

## 2025-03-10 RX ORDER — INSULIN GLARGINE 100 [IU]/ML
50 INJECTION, SOLUTION SUBCUTANEOUS NIGHTLY
Status: DISCONTINUED | OUTPATIENT
Start: 2025-03-10 | End: 2025-03-11

## 2025-03-10 RX ORDER — LATANOPROST 50 UG/ML
1 SOLUTION/ DROPS OPHTHALMIC 2 TIMES DAILY
Status: DISCONTINUED | OUTPATIENT
Start: 2025-03-10 | End: 2025-03-14 | Stop reason: HOSPADM

## 2025-03-10 RX ORDER — SENNOSIDES 8.6 MG/1
2 TABLET ORAL NIGHTLY
Status: DISCONTINUED | OUTPATIENT
Start: 2025-03-10 | End: 2025-03-14 | Stop reason: HOSPADM

## 2025-03-10 RX ORDER — POLYETHYLENE GLYCOL 3350 17 G/17G
17 POWDER, FOR SOLUTION ORAL DAILY
Qty: 30 PACKET | Refills: 0 | Status: SHIPPED | OUTPATIENT
Start: 2025-03-10 | End: 2025-04-09

## 2025-03-10 RX ORDER — INSULIN LISPRO 100 [IU]/ML
0-15 INJECTION, SOLUTION INTRAVENOUS; SUBCUTANEOUS
Status: DISCONTINUED | OUTPATIENT
Start: 2025-03-10 | End: 2025-03-14 | Stop reason: HOSPADM

## 2025-03-10 RX ORDER — INSULIN LISPRO 100 [IU]/ML
20 INJECTION, SOLUTION INTRAVENOUS; SUBCUTANEOUS
Status: DISCONTINUED | OUTPATIENT
Start: 2025-03-10 | End: 2025-03-11

## 2025-03-10 RX ORDER — POLYETHYLENE GLYCOL 3350 17 G/17G
17 POWDER, FOR SOLUTION ORAL DAILY
Status: DISCONTINUED | OUTPATIENT
Start: 2025-03-10 | End: 2025-03-14 | Stop reason: HOSPADM

## 2025-03-10 RX ORDER — OXYCODONE HYDROCHLORIDE 10 MG/1
10 TABLET ORAL EVERY 4 HOURS PRN
Start: 2025-03-10 | End: 2025-03-14 | Stop reason: HOSPADM

## 2025-03-10 RX ORDER — SENNOSIDES 8.6 MG/1
2 TABLET ORAL NIGHTLY
Qty: 60 TABLET | Refills: 0 | Status: SHIPPED | OUTPATIENT
Start: 2025-03-10 | End: 2025-04-09

## 2025-03-10 RX ORDER — VERAPAMIL HYDROCHLORIDE 120 MG/1
120 TABLET, FILM COATED ORAL 3 TIMES DAILY
Status: DISCONTINUED | OUTPATIENT
Start: 2025-03-11 | End: 2025-03-14 | Stop reason: HOSPADM

## 2025-03-10 RX ADMIN — INSULIN LISPRO 10 UNITS: 100 INJECTION, SOLUTION INTRAVENOUS; SUBCUTANEOUS at 14:04

## 2025-03-10 RX ADMIN — ASPIRIN 81 MG: 81 TABLET, COATED ORAL at 09:36

## 2025-03-10 RX ADMIN — GUAIFENESIN 600 MG: 600 TABLET ORAL at 09:36

## 2025-03-10 RX ADMIN — GABAPENTIN 300 MG: 300 CAPSULE ORAL at 20:51

## 2025-03-10 RX ADMIN — METHOCARBAMOL 1000 MG: 500 TABLET ORAL at 23:56

## 2025-03-10 RX ADMIN — ACETAMINOPHEN 975 MG: 325 TABLET ORAL at 03:38

## 2025-03-10 RX ADMIN — INSULIN GLARGINE 50 UNITS: 100 INJECTION, SOLUTION SUBCUTANEOUS at 20:54

## 2025-03-10 RX ADMIN — OXYCODONE HYDROCHLORIDE 10 MG: 10 TABLET ORAL at 09:37

## 2025-03-10 RX ADMIN — HYDROMORPHONE HYDROCHLORIDE 0.4 MG: 1 INJECTION, SOLUTION INTRAMUSCULAR; INTRAVENOUS; SUBCUTANEOUS at 18:08

## 2025-03-10 RX ADMIN — CEFAZOLIN SODIUM 2 G: 2 INJECTION, SOLUTION INTRAVENOUS at 00:07

## 2025-03-10 RX ADMIN — MONTELUKAST 10 MG: 10 TABLET, FILM COATED ORAL at 20:51

## 2025-03-10 RX ADMIN — INSULIN LISPRO 10 UNITS: 100 INJECTION, SOLUTION INTRAVENOUS; SUBCUTANEOUS at 09:42

## 2025-03-10 RX ADMIN — GUAIFENESIN 600 MG: 600 TABLET ORAL at 20:51

## 2025-03-10 RX ADMIN — ACETAMINOPHEN 975 MG: 325 TABLET ORAL at 09:36

## 2025-03-10 RX ADMIN — FLUTICASONE FUROATE AND VILANTEROL TRIFENATATE 1 PUFF: 200; 25 POWDER RESPIRATORY (INHALATION) at 07:31

## 2025-03-10 RX ADMIN — CEFAZOLIN SODIUM 2 G: 2 INJECTION, SOLUTION INTRAVENOUS at 09:36

## 2025-03-10 RX ADMIN — METHOCARBAMOL 1000 MG: 500 TABLET ORAL at 05:22

## 2025-03-10 RX ADMIN — OXYCODONE HYDROCHLORIDE 10 MG: 10 TABLET ORAL at 16:39

## 2025-03-10 RX ADMIN — GABAPENTIN 300 MG: 300 CAPSULE ORAL at 09:36

## 2025-03-10 RX ADMIN — SENNOSIDES 17.2 MG: 8.6 TABLET, FILM COATED ORAL at 20:50

## 2025-03-10 RX ADMIN — INSULIN LISPRO 6 UNITS: 100 INJECTION, SOLUTION INTRAVENOUS; SUBCUTANEOUS at 09:41

## 2025-03-10 RX ADMIN — LOSARTAN POTASSIUM 100 MG: 25 TABLET, FILM COATED ORAL at 09:36

## 2025-03-10 RX ADMIN — ROSUVASTATIN CALCIUM 40 MG: 40 TABLET, FILM COATED ORAL at 20:51

## 2025-03-10 RX ADMIN — POLYETHYLENE GLYCOL 3350 17 G: 17 POWDER, FOR SOLUTION ORAL at 18:08

## 2025-03-10 RX ADMIN — INSULIN LISPRO 8 UNITS: 100 INJECTION, SOLUTION INTRAVENOUS; SUBCUTANEOUS at 12:42

## 2025-03-10 RX ADMIN — ACETAMINOPHEN 975 MG: 325 TABLET ORAL at 22:29

## 2025-03-10 RX ADMIN — INSULIN LISPRO 10 UNITS: 100 INJECTION, SOLUTION INTRAVENOUS; SUBCUTANEOUS at 12:42

## 2025-03-10 RX ADMIN — CEFAZOLIN SODIUM 2 G: 2 INJECTION, SOLUTION INTRAVENOUS at 20:57

## 2025-03-10 RX ADMIN — OXYCODONE HYDROCHLORIDE 10 MG: 10 TABLET ORAL at 03:38

## 2025-03-10 RX ADMIN — METHOCARBAMOL 1000 MG: 500 TABLET ORAL at 16:39

## 2025-03-10 ASSESSMENT — COGNITIVE AND FUNCTIONAL STATUS - GENERAL
CLIMB 3 TO 5 STEPS WITH RAILING: TOTAL
DRESSING REGULAR UPPER BODY CLOTHING: A LOT
WALKING IN HOSPITAL ROOM: TOTAL
PERSONAL GROOMING: A LOT
DRESSING REGULAR LOWER BODY CLOTHING: TOTAL
DAILY ACTIVITIY SCORE: 10
MOVING TO AND FROM BED TO CHAIR: TOTAL
STANDING UP FROM CHAIR USING ARMS: TOTAL
MOBILITY SCORE: 7
TURNING FROM BACK TO SIDE WHILE IN FLAT BAD: TOTAL
MOVING FROM LYING ON BACK TO SITTING ON SIDE OF FLAT BED WITH BEDRAILS: A LOT
TOILETING: TOTAL
EATING MEALS: A LOT
HELP NEEDED FOR BATHING: A LOT

## 2025-03-10 ASSESSMENT — PAIN DESCRIPTION - LOCATION
LOCATION: BACK

## 2025-03-10 ASSESSMENT — PAIN SCALES - GENERAL
PAINLEVEL_OUTOF10: 8
PAINLEVEL_OUTOF10: 5 - MODERATE PAIN
PAINLEVEL_OUTOF10: 9
PAINLEVEL_OUTOF10: 9
PAINLEVEL_OUTOF10: 6
PAINLEVEL_OUTOF10: 8
PAINLEVEL_OUTOF10: 9
PAINLEVEL_OUTOF10: 7

## 2025-03-10 ASSESSMENT — PAIN - FUNCTIONAL ASSESSMENT
PAIN_FUNCTIONAL_ASSESSMENT: 0-10

## 2025-03-10 ASSESSMENT — PAIN DESCRIPTION - ORIENTATION: ORIENTATION: MID

## 2025-03-10 ASSESSMENT — ACTIVITIES OF DAILY LIVING (ADL): ADL_ASSISTANCE: NEEDS ASSISTANCE

## 2025-03-10 NOTE — DISCHARGE INSTR - AVS FIRST PAGE
Potential Thoracolumbar Spine Complications  -You are breathing faster than normal.  -Fever of 100.4 F or higher.  -Chills  -Urinating less than 4 times per day.  -Acting very sleepy and difficult to awaken.  -Vomiting and not able to eat or drink for 12 hours  -3 or more loose, watery bowel movements in 24 hours (Diarrhea)  -Concerns over the appearance of your incision.    Return immediately to the ER:  -Persistent bilateral leg pain and or numbness >24 hours.  -Perineal numbness/sensory loss  -Urinary retention >12 hours  -Loss of bowel/ bladder control

## 2025-03-10 NOTE — PROGRESS NOTES
Physical Therapy    Physical Therapy Evaluation & Treatment    Patient Name: Abbie Mayo  MRN: [id number]  Department: [place] [address]  Room: [facility]5557-A  Today's Date: 3/10/2025   Time Calculation  Start Time: 1117  Stop Time: 1147  Time Calculation (min): 30 min    Assessment/Plan   PT Assessment  PT Assessment Results: Decreased strength, Impaired balance, Decreased mobility, Decreased coordination, Orthopedic restrictions, Pain  Rehab Prognosis: Good  Barriers to Discharge Home: Caregiver assistance, Physical needs  Caregiver Assistance: Caregiver assistance needed per identified barriers - however, level of patient's required assistance exceeds assistance available at home  Physical Needs: Stair navigation into home limited by function/safety, 24hr mobility assistance needed, 24hr ADL assistance needed, High falls risk due to function or environment  End of Session Communication: Bedside nurse  Assessment Comment: Pt currently demonstrates decreased strength, need for heavy assist for all mobility tasks, below her recent baseline for functional mobility (was able to stand and transfer with assist, currently max-dependent for bed mobility tasks).  Will benefit from skilled PT to make improvements in overall mobility tasks to increased independence.  End of Session Patient Position: Bed, 3 rail up   IP OR SWING BED PT PLAN  Inpatient or Swing Bed: Inpatient  PT Plan  Treatment/Interventions: Bed mobility, Transfer training, Gait training, Balance training, Therapeutic exercise, Therapeutic activity  PT Plan: Ongoing PT  PT Frequency: Daily  PT Discharge Recommendations: Moderate intensity level of continued care  PT Recommended Transfer Status: Total assist, Assist x2  PT - OK to Discharge: Yes (POC/goals/discharge rec intensity created)      Subjective     General Visit Information:  General  Reason for Referral: cauda equina syndrome/(B) LE weakness/incontinence s/p 3/8 T9-10, T10-11, L2-3 laminectomies and medical  "facectomies, T9, 10, 11 posterior spinal fusion and instrumentation, wound vac application, repair of dura (was on bedrest post-op due to dural tear, OK to mobilize this date per ortho note)  Past Medical History Relevant to Rehab: DM, HTN, asthma, compression fx T8/9.  Ongoing progressive weakness in (B) LE  Co-Treatment: OT  Co-Treatment Reason: for patient safety with mobility due to need for 2 person assist prior to surgery for mobility tasks  Prior to Session Communication: Bedside nurse  General Comment: Pt agreeable to participate, currently requiring heavy assist to complete all mobility tasks as well as mobility with (B) LEs.  Will continue to follow.  Home Living:  Home Living  Type of Home: House  Lives With:  ( and son)  Home Adaptive Equipment:  (rollator)  Home Layout: One level  Home Access: Stairs to enter with rails  Entrance Stairs-Number of Steps: 3  Prior Level of Function:  Prior Function Per Pt/Caregiver Report  Receives Help From: Family  ADL Assistance:  (reports she can complete hygiene herself)  Ambulatory Assistance:  (reports  and son both need to assist her into standing and \"drag\" her into the bathroom....unclear if amb or sitting on rollator to complete.  Reports being carried on rollator up/down entry steps. .)  Prior Function Comments: Per chart review, was reported she amb short household distances in Dec 2024.  Pt reports was starting to hurt under her arms from family assisting her.  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, hearing appears WFL  Medical Precautions: Fall precautions  Post-Surgical Precautions: Spinal precautions  Precautions Comment: MARIAN drain, prevana, hemovac, naqvi         Objective   Pain:  Pain Assessment  Pain Assessment: 0-10  0-10 (Numeric) Pain Score: 9  Pain Location: Back  Pain Interventions: Repositioned, Distraction (alerted bedside nurse that likely would be ready for pain meds when " due)  Cognition:  Cognition  Arousal/Alertness: Appropriate responses to stimuli  Orientation Level:  (reports not keeping track of date, knows month, names year with increased time)  Following Commands: Follows one step commands with repetition  Cognition Comments: makes sometimes random off topic comments throughout they session/tangential convos    General Assessments:     Activity Tolerance  Endurance: Tolerates 10 - 20 min exercise with multiple rests    Sensation  Sensation Comment: reports numbness (B) feet but can also feel pain/soreness in feet           Coordination  Movements are Fluid and Coordinated: No  Coordination Comment: limited ability to mobilize (B) LEs  without assist.  Some difficulty coordinating UEs for mobility tasks likely due to pain.    Postural Control  Postural Control: Impaired  Trunk Control: impaired  Righting Reactions: impaired  Protective Responses: impaired  Posture Comment: dependent for sitting balance    Static Sitting Balance  Static Sitting-Balance Support: Feet supported (limited UE use for support, will hold onto upper bed rail with (R) UE)  Static Sitting-Level of Assistance: Dependent     Functional Assessments:  Bed Mobility  Bed Mobility: Yes  Bed Mobility 1  Bed Mobility 1: Rolling right  Level of Assistance 1: Maximum assistance, Maximum verbal cues, +2  Bed Mobility Comments 1: use of draw sheet, assist to flex (B) hips/knees, guided (L) hand to upper bed rail with pt able to maintain  to attempt to assist to maintain sidelying  Bed Mobility 2  Bed Mobility  2: Side lying right to sit  Level of Assistance 2: Dependent, +2, Maximum verbal cues  Bed Mobility Comments 2: heavy assist with draw sheet to manuever hips and bring (B) LEs off EOB in controlled manner, heavy assist to bring trunk fully upright  Bed Mobility 3  Bed Mobility 3: Sitting to supine  Level of Assistance 3: Maximum assistance, +2, Maximum verbal cues  Bed Mobility Comments 3: assist at trunk  to control, assist at (B) LEs to bring into bed  Bed Mobility 4  Bed Mobility 4: Scooting (in supine)  Level of Assistance 4: Dependent, +2, Maximum verbal cues  Bed Mobility Comments 4: required assist at head to lift off bed along with 2 person assist with draw sheet and bed in trendelenberg to scoot higher in bed    Transfers  Transfer: No (unable to maintain upright sitting balance at this time to attempt further mobility tasks, will attempt in future sessions as feasible.)  Extremity/Trunk Assessments:  RLE   RLE : Exceptions to WFL  Strength RLE  R Hip Flexion: 2-/5  R Hip ABduction: 2-/5  R Hip ADduction: 2-/5  R Knee Extension: 2/5  R Ankle Dorsiflexion: 3/5  R Ankle Plantar Flexion: 3/5  LLE   LLE : Exceptions to WFL  Strength LLE  L Hip Flexion: 2/5  L Hip ABduction: 2/5  L Hip ADduction: 2/5  L Knee Extension: 2/5  L Ankle Dorsiflexion: 3/5  L Ankle Plantar Flexion: 3/5  Treatments:  Therapeutic Exercise  Therapeutic Exercise Performed: Yes  Therapeutic Exercise Activity 1: (B) LE AAROM HS, hip abduction/adduction x 5 reps each.  Completing APs throughout session, reports she does many on her own.    Therapeutic Activity  Therapeutic Activity Performed: Yes  Therapeutic Activity 1: Sat to EOB ~5 min with dependent assist, encouraged use of (B) UEs to support as well as trunk/core engagement to support self. 2 trials of lessened support unsuccessful in that pt unable self correct or maintain upright posture without dependent assist.  Outcome Measures:  [facility] Basic Mobility  Turning from your back to your side while in a flat bed without using bedrails: A lot  Moving from lying on your back to sitting on the side of a flat bed without using bedrails: Total  Moving to and from bed to chair (including a wheelchair): Total  Standing up from a chair using your arms (e.g. wheelchair or bedside chair): Total  To walk in hospital room: Total  Climbing 3-5 steps with railing: Total  Basic Mobility - Total Score:  7    Encounter Problems       Encounter Problems (Active)       Balance       STG - Maintains static sitting balance with upper extremity support with min Ax1.        Start:  03/10/25    Expected End:  03/24/25            STG - Maintains dynamic sitting balance with upper extremity support with mod Ax1.        Start:  03/10/25    Expected End:  03/24/25               PT Transfers       STG - Transfer from bed to chair mod Ax1.        Start:  03/10/25    Expected End:  03/24/25            STG - Patient will perform bed mobility min Ax1.        Start:  03/10/25    Expected End:  03/24/25            STG - Patient will transfer sit to and from stand mod Ax1.        Start:  03/10/25    Expected End:  03/24/25                   Education Documentation  Precautions, taught by Nelsy Pate PT at 3/10/2025 12:26 PM.  Learner: Patient  Readiness: Acceptance  Method: Explanation  Response: Verbalizes Understanding, Needs Reinforcement  Comment: spine precautions, PT POC, progression of mobility    Mobility Training, taught by Nelsy Pate PT at 3/10/2025 12:26 PM.  Learner: Patient  Readiness: Acceptance  Method: Explanation  Response: Verbalizes Understanding, Needs Reinforcement  Comment: spine precautions, PT POC, progression of mobility    03/10/25 at 12:27 PM - Nelsy Pate PT

## 2025-03-10 NOTE — PROGRESS NOTES
Ms. Abbie Mayo is a 69 y.o. female who is on hospital day #3 for No chief complaint on file..      Assessment/Plan     Ms. Mayo is a 70y/o lady with history of T2DM (A1C 10.6%, 9/2024), HTN, asthma, and chronic thoracic spinal stenosis c/b LE weakness and urinary incontinence who presented due to acute worsening of LE weakness. S/p T9-11 laminectomy with PSIF with Dr. Murguia (3/8). Drains still in place.    # Thoracic spinal stenosis  # LE weakness  # Urinary incontinence  Patient with 6mo history of slowly worsening back pain and LE weakness. Patient started developing urinary incontinence and was evaluated by neurosurgery on 8/2024. She was planned for surgery several times, but was unable to procedure due to an asthma exacerbation and then uncontrolled diabetes.   - MRI from [facility] with severe thoracic stenosis with cord compression and cord signal change at T9-10 and T10-11   - Ortho spine consulted, s/p T9-11 laminectomy with PSIF with Dr. Murguia (3/8)  - Will restart DVT ppx 3/11 PM  - Pain regimen: scheduled tylenol and robaxin 1000mg q8hr; oxy 10mg q4hr prn and IV dilaudid 0.4mg for breakthrough  - PT/OT consulted, recommended moderate intensity    # T2DM  A1C 10.6 (9/2024). Home regimen is lantus 48U at bedtime + lispro 20U TIDAC with metformin. Sugars not well-controlled.  - Continue lantus 50U at bedtime + lispro 20U TIDAC     # HTN - continue home verapamil given sinus tachycardia, hold home losartan  # Asthma - continue home Breo + montelukast    CORE MEASURES  N: diabetic diet  P: subcutaneous lovenox  C: Full code, confirmed  HCP: Jorge, spouse [phone]   Dispo: SNF pending drain removal    55 minutes were spent on patient care, chart review, and discussion with the care team and family.    Genevieve Crawford MD      Subjective    Patient states her pain is well-controlled and she has started eating and moving without issue.     Objective    Vitals:    03/10/25 0351   BP: 118/76    Pulse: (!) 124   Resp: 18   Temp: 36.3 °C (97.3 °F)   SpO2: 97%        Intake/Output Summary (Last 24 hours) at 3/10/2025 1352  Last data filed at 3/10/2025 1250  Gross per 24 hour   Intake 1558.33 ml   Output 1090 ml   Net 468.33 ml        GEN: well-appearing, no acute distress  HEENT: moist mucous membranes, no scleral icterus  NECK: Supple  CV: RRR, no murmurs/rubs/gallops  PULM: Breathing comfortably, clear to auscultation bilaterally  AB: Soft, non-tender, non-distended  : No in-dwelling naqvi  MSK: No lower extremity edema bilaterally, MARIAN drain and HV with serosanguinous drainage  NEURO: A&Ox3, following commands, conversational    LABS AND STUDIES  Relevant labs and studies independently reviewed and interpreted.    MEDICATIONS  acetaminophen, 975 mg, oral, q6h  aspirin, 81 mg, oral, Daily  ceFAZolin, 2 g, intravenous, q8h  [START ON 3/11/2025] enoxaparin, 40 mg, subcutaneous, q24h DG  fluticasone furoate-vilanteroL, 1 puff, inhalation, Daily  gabapentin, 300 mg, oral, BID  guaiFENesin, 600 mg, oral, BID  insulin glargine, 50 Units, subcutaneous, Nightly  insulin lispro, 0-15 Units, subcutaneous, TID AC  insulin lispro, 10 Units, subcutaneous, Once  insulin lispro, 20 Units, subcutaneous, TID AC  latanoprost, 1 drop, Right Eye, Nightly  losartan, 100 mg, oral, Daily  methocarbamol, 1,000 mg, oral, q8h DG  montelukast, 10 mg, oral, Nightly  rosuvastatin, 40 mg, oral, Nightly  [Held by provider] verapamil, 120 mg, oral, TID

## 2025-03-10 NOTE — PROGRESS NOTES
"Orthopaedic Surgery Progress Note    Subjective: NAEON. Tolerated HOB elevation to 30, developed a headache this AM but states it was due to not eating. Denies chest pain, shortness of breath, or fevers.    Objective:  /76 (BP Location: Left arm, Patient Position: Lying)   Pulse (!) 124   Temp 36.3 °C (97.3 °F) (Temporal)   Resp 18   Ht 1.676 m (5' 5.98\")   Wt 99.7 kg (219 lb 12.8 oz)   SpO2 97%   BMI 35.49 kg/m²     Gen: arousable, NAD, appropriately conversational  Cardiac: RRR to peripheral palpation  Resp: nonlabored on RA  GI: soft, nondistended    MSK:  HV x1 and MARIAN x1 in place, HV to gravity    L1: SILT       L2: SILT      Hip flexors 2/5 Left; 3/5 Right  L3: SILT      Knee extension 2/5 Left; 3/5 Right  L4: SILT      Tib Ant. (Dorsiflexion) 5/5 Left; 5/5 Right  L5: SILT      EHL5/5 Left; 5/5 Right  S1: SILT      Plantar flexion 5/5 Left; 5/5 Right      Assessment/Plan: 69 y.o. female s/p T9-T11 PSIF and decompression, L2-3 laminectomy, dural repair on 3/8/25 with Dr. Murguia.      Plan:  - Weight-bearing status: Ok to mobilize OOB ad samy  - Regular diet as tolerated, bowel regimen  - Multimodal pain  - OT/PT  - Reyna-operative Ancef for 48 hours  - MARIAN x1 to suction, Hvx1 to gravity, Maintain drain, monitor and record output q8 hrs  - Kelley: None   - DVT ppx: SCDs, early mobility. May resume DVT ppx 72h postop (3/11 PM)    Dispo: Pending PT/pain control    Plan was discussed with attending Dr. Murgiua    While admitted, this patient will be followed by the Ortho Spine Team, available via Epic Chat weekdays 6a-6p. Please page [phone] on nights and weekends.    Ortho Spine  First Call: John Colunga, PGY-2  Second Call: Alix Diaz, PGY-4  Third Call: Marquise Pastor, Fellow    John Colunga MD  Orthopedic Surgery, PGY-2  Available via Epic Chat    "

## 2025-03-10 NOTE — DISCHARGE INSTR - ACTIVITY
Activity With Thoracolumbar Spine Precautions  -Activity with assistance.  -Progressively walk 2 times a day.   -Weight bearing as tolerated.  -No pushing, pulling, or lifting objects greater than 10 pounds until follow up appointment.  -No excessive bending, twisting, or heavy house or yard work.  -You may shower once there is no drainage from your incision site for 48 hours.  -You may not drive until your follow up appointment, or while taking narcotic medication.  -You may not return to work/school until your follow appointment.    Patient called in to check on the status of the medication refill.

## 2025-03-10 NOTE — DISCHARGE INSTRUCTIONS
**Please call Samantha Irving RN (at [phone]) for any          Postoperative Thoracolumbar Spine questions or concerns.

## 2025-03-10 NOTE — NURSING NOTE
Rapid Response Nurse Note: RADAR alert: 8    Pager time: [phone]  Arrival time: 1445  Event end time: 1505  Location: [place] [address]  [] Triage by phone or secure messaging    Rapid response initiated by:  [] Rapid response RN [] Family [] Nursing Supervisor [] Physician   [x] RADAR auto page [] Sepsis auto-page [] RN [] RT   [] NP/PA [] Other:     Primary reason for call:   [] BAT [] New CPAP/BiPAP [] Bleeding [] Change in mental status   [] Chest pain [] Code blue [] FiO2 >/= 50% [] HR </= 40 bpm   [] HR >/= 130 bpm [] Hyperglycemia [] Hypoglycemia [x] RADAR    [] RR </= 8 bpm [] RR >/= 30 bpm [] SBP </= 90 mmHg [] SpO2 < 90%   [] Seizure [] Sepsis [] Shortness of breath  [] Staff concern: see comments     Initial VS and/or RADAR VS: T 36.7 °C; ; RR 18; /89; SPO2 91%.        Interventions:  [x] None [] ABG/VBG [] Assist w/ICU transfer [] BAT paged    [] Bag mask [] Blood [] Cardioversion [] Code Blue   [] Code blue for intubation [] Code status changed [] Chest x-ray [] EKG   [] IV fluid/bolus [] KUB x-ray [] Labs/cultures [] Medication   [] Nebulizer treatment [] NIPPV (CPAP/BiPAP) [] Oxygen [] Oral airway   [] Peripheral IV [] Palliative care consult [] CT/MRI [] Sepsis protocol    [] Suctioned [] Other:     Outcome:  [] Coded and  [] Code blue for intubation [] Coded and transferred to ICU []  on division   [] Remained on division (no change) [] Remained on division + additional monitoring [] Remained in ED [] Transferred to ED   [] Transferred to ICU [] Transferred to inpatient status [] Transferred for interventions (procedure) [] Transferred to ICU stepdown    [] Transferred to surgery [x] Transferred to telemetry [] Sepsis protocol [] STEMI protocol   [] Stroke protocol [x] Bedside nurse instructed to page rapid response for any concerns or acute change in condition/VS     Additional Comments: Radar autopage of 8 for above VS. Pt seen, she is sleeping quietly.  Spoke with bedside nurse,  reviewed VS. They are consistent with her current trends. Notable for increased HR . Pt is asymptomatic. She is being transferred to L50 for tele monitoring. No acute issues at this time.

## 2025-03-10 NOTE — SIGNIFICANT EVENT
Rapid Response Nurse Note: RADAR alert: 8    Pager time:   Arrival time:   Event end time: ***  Location: ***  [] Triage by phone or secure messaging    Rapid response initiated by:  [] Rapid response RN [] Family [] Nursing Supervisor [] Physician   [] RADAR auto page [] Sepsis auto-page [] RN [] RT   [] NP/PA [] Other:     Primary reason for call:   [] BAT [] New CPAP/BiPAP [] Bleeding [] Change in mental status   [] Chest pain [] Code blue [] FiO2 >/= 50% [] HR </= 40 bpm   [] HR >/= 130 bpm [] Hyperglycemia [] Hypoglycemia [] RADAR    [] RR </= 8 bpm [] RR >/= 30 bpm [] SBP </= 90 mmHg [] SpO2 < 90%   [] Seizure [] Sepsis [] Shortness of breath  [] Staff concern: see comments     Initial VS and/or RADAR VS: T *** °C; HR ***; RR ***; BP ***; SPO2 ***%.    Providers present at bedside (if applicable): ***    Name of ICU Provider contacted (if applicable): ***    Interventions:  [] None [] ABG/VBG [] Assist w/ICU transfer [] BAT paged    [] Bag mask [] Blood [] Cardioversion [] Code Blue   [] Code blue for intubation [] Code status changed [] Chest x-ray [] EKG   [] IV fluid/bolus [] KUB x-ray [] Labs/cultures [] Medication   [] Nebulizer treatment [] NIPPV (CPAP/BiPAP) [] Oxygen [] Oral airway   [] Peripheral IV [] Palliative care consult [] CT/MRI [] Sepsis protocol    [] Suctioned [] Other:     Outcome:  [] Coded and  [] Code blue for intubation [] Coded and transferred to ICU []  on division   [] Remained on division (no change) [] Remained on division + additional monitoring [] Remained in ED [] Transferred to ED   [] Transferred to ICU [] Transferred to inpatient status [] Transferred for interventions (procedure) [] Transferred to ICU stepdown    [] Transferred to surgery [] Transferred to telemetry [] Sepsis protocol [] STEMI protocol   [] Stroke protocol [x] Bedside nurse instructed to page rapid response for any concerns or acute change in condition/VS     Additional Comments:  ***

## 2025-03-10 NOTE — DISCHARGE INSTR - OTHER ORDERS
Wound Care  -Wound site: Back (Thoracolumbar Spine)  -Wound Type: Surgical Incision  -Once the Prevena incisional wound vac is removed, change the dressing daily and continue until the incision is no longer draining.          *Once the incision  has been dry for 48 hours, you may leave the dressing off and the site open to air.  -Cover the wound with an abdominal pad and secure it with paper tape around the edges.  -The sutures in your incision will be removed at your postoperative follow-up appointment.  -No Lotions or Creams on or around the incision site.  -No tub soaks  -You may use heat or ice to your incision sites and or back as         needed for comfort.      **No NSAIDS (Advil, Ibuprofen, Alevel, Etc.) until your postoperative follow up appointment, they may interfere with the healing of the fusion.        *Resuming NSAID's should be discussed at your follow up appointment.

## 2025-03-10 NOTE — PROGRESS NOTES
Abbie Mayo is a 69 y.o. female on day 3 of admission presenting with Cauda equina compression (Multi).    Transitional Care Coordination Progress Note:  Patient discussed during interdisciplinary rounds.   Team members present: MD & TCC  Plan per Medical/Surgical team:  s/p thoracic spine surgery, pending pt/ot managing pain  Payor: Medicare/ [facility]  Discharge disposition: Rec SNF patient agreeable, given list from medicare.gov via careport for foc awaiting choices  Potential Barriers: None  ADOD: 3/13    TCC will continue to follow and update the plan as warranted.    MARIANA BeaverN-RN  Transitional Care Coordinator (TCC)  [phone] ext [phone]

## 2025-03-10 NOTE — SIGNIFICANT EVENT
Rapid Response Nurse Note: RADAR alert: 8    Pager time:   Arrival time:   Event end time:   Location: [id number]  [x] Triage by phone or secure messaging    Rapid response initiated by:  [] Rapid response RN [] Family [] Nursing Supervisor [] Physician   [] RADAR auto page [] Sepsis auto-page [] RN [] RT   [] NP/PA [] Other:     Primary reason for call:   [] BAT [] New CPAP/BiPAP [] Bleeding [] Change in mental status   [] Chest pain [] Code blue [] FiO2 >/= 50% [] HR </= 40 bpm   [] HR >/= 130 bpm [] Hyperglycemia [] Hypoglycemia [] RADAR    [] RR </= 8 bpm [] RR >/= 30 bpm [] SBP </= 90 mmHg [] SpO2 < 90%   [] Seizure [] Sepsis [] Shortness of breath  [] Staff concern: see comments     Initial VS and/or RADAR VS: T 37.8 °C; ; RR 22; /72; SPO2 94% nc.    Providers present at bedside (if applicable): ***    Name of ICU Provider contacted (if applicable): ***    Interventions:  [] None [] ABG/VBG [] Assist w/ICU transfer [] BAT paged    [] Bag mask [] Blood [] Cardioversion [] Code Blue   [] Code blue for intubation [] Code status changed [] Chest x-ray [] EKG   [] IV fluid/bolus [] KUB x-ray [] Labs/cultures [] Medication   [] Nebulizer treatment [] NIPPV (CPAP/BiPAP) [] Oxygen [] Oral airway   [] Peripheral IV [] Palliative care consult [] CT/MRI [] Sepsis protocol    [] Suctioned [] Other:     Outcome:  [] Coded and  [] Code blue for intubation [] Coded and transferred to ICU []  on division   [] Remained on division (no change) [] Remained on division + additional monitoring [] Remained in ED [] Transferred to ED   [] Transferred to ICU [] Transferred to inpatient status [] Transferred for interventions (procedure) [] Transferred to ICU stepdown    [] Transferred to surgery [] Transferred to telemetry [] Sepsis protocol [] STEMI protocol   [] Stroke protocol [x] Bedside nurse instructed to page rapid response for any concerns or acute change in condition/VS     Additional  Comments: ***

## 2025-03-10 NOTE — PROGRESS NOTES
Occupational Therapy    Evaluation/Treatment    Patient Name: Abbie Mayo  MRN: [id number]  Department: [facility]  Room: 5555557-A  Today's Date: 03/10/25  Time Calculation  Start Time: 1118  Stop Time: 1148  Time Calculation (min): 30 min       Assessment:  OT Assessment:  (Pt would benefit from moderate intensity OT to address ADLs, transfers, and mobility)  Barriers to Discharge Home: Physical needs  End of Session Communication: Bedside nurse  End of Session Patient Position: Bed, 3 rail up, Alarm on  OT Assessment Results: Decreased ADL status, Decreased endurance, Decreased fine motor control, Decreased functional mobility, Decreased IADLs  Plan:  Treatment Interventions: ADL retraining, Functional transfer training, Endurance training, Patient/family training  OT Frequency: 2 times per week  OT Discharge Recommendations: Moderate intensity level of continued care  Equipment Recommended upon Discharge:  (TBD)  OT - OK to Discharge: Yes  Treatment Interventions: ADL retraining, Functional transfer training, Endurance training, Patient/family training    Subjective   Current Problem:  1. Cauda equina compression (Multi)        2. Thoracic myelopathy  BLOOD GAS ARTERIAL FULL PANEL    Blood Gas Arterial Full Panel    Blood Gas Arterial Full Panel    Blood Gas Arterial Full Panel      3. Ossification of ligamentum flavum  BLOOD GAS ARTERIAL FULL PANEL    Blood Gas Arterial Full Panel    Blood Gas Arterial Full Panel    Blood Gas Arterial Full Panel      4. Thoracic spinal stenosis  BLOOD GAS ARTERIAL FULL PANEL    Blood Gas Arterial Full Panel    Blood Gas Arterial Full Panel    Blood Gas Arterial Full Panel    acetaminophen (Tylenol) 500 mg tablet    oxyCODONE (Roxicodone) 10 mg immediate release tablet    polyethylene glycol (Glycolax, Miralax) 17 gram packet    sennosides (Senokot) 8.6 mg tablet        General:   OT Received On: 03/10/25  General  Reason for Referral: cauda equina syndrome/(B) LE  "weakness/incontinence s/p 3/8 T9-10, T10-11, L2-3 laminectomies and medical facectomies, T9, 10, 11 posterior spinal fusion and instrumentation, wound vac application, repair of dura  Past Medical History Relevant to Rehab: DM, HTN, asthma, compression fx T8/9.  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, hearing appears WFL  Medical Precautions: Fall precautions  Post-Surgical Precautions: Spinal precautions          Pain:  Pain Assessment  Pain Assessment: 0-10  0-10 (Numeric) Pain Score: 8  Pain Type: Surgical pain  Pain Location: Back    Objective   Cognition:  Overall Cognitive Status: Within Functional Limits           Home Living:  Type of Home: House  Lives With:  ( and son)  Home Adaptive Equipment:  (rollator)  Home Layout: One level  Home Access: Stairs to enter with rails  Prior Function:  Receives Help From: Family  ADL Assistance: Needs assistance, family assists with all dressing, sponge bathing, pt reports she can perform her own woo hygiene after toileting  Ambulatory Assistance: Needs assistance, pt unclear stating her family will \"lift\" or \"drag\" her to the bathroom while utilizing the rollator   IADLs- family manages meal prep, cleaning, etc  ADL:  LE Dressing Assistance: Total  LE Dressing Deficit: Don/doff R sock, Don/doff L sock       Bed Mobility/Transfers: Bed Mobility 1  Bed Mobility 1: Rolling right  Level of Assistance 1: Maximum assistance, +2  Bed Mobility Comments 1:  (bedrails, draw sheet)  Bed Mobility 2  Bed Mobility  2: Supine to sitting, Sitting to supine  Level of Assistance 2: Dependent, +2  Bed Mobility Comments 2:  (assist " with (B) LE/trunk)  Bed Mobility 3  Bed Mobility 3: Scooting  Level of Assistance 3: Dependent, +2  Bed Mobility Comments 3:  (draw sheet)    Sitting Balance:  Static Sitting Balance  Static Sitting-Level of Assistance:  (Max-total assist sitting EOB, heavy posterior/lateral lean to the (R))  Static Sitting-Comment/Number of Minutes: 8      Strength:  Strength Comments:  ((B) UE ~3+/5 in all planes)      Outcome Measures: [facility] Daily Activity  Putting on and taking off regular lower body clothing: Total  Bathing (including washing, rinsing, drying): A lot  Putting on and taking off regular upper body clothing: A lot  Toileting, which includes using toilet, bedpan or urinal: Total  Taking care of personal grooming such as brushing teeth: A lot  Eating Meals: A lot  Daily Activity - Total Score: 10   and Brief Confusion Assessment Method (bCAM)  CAM Result: CAM -    Education Documentation  Body Mechanics, taught by Sandra Melton OT at 3/10/2025  2:46 PM.  Learner: Patient  Readiness: Acceptance  Method: Explanation  Response: Verbalizes Understanding    Precautions, taught by Sandra Melton OT at 3/10/2025  2:46 PM.  Learner: Patient  Readiness: Acceptance  Method: Explanation  Response: Verbalizes Understanding    ADL Training, taught by Sandra Melton OT at 3/10/2025  2:46 PM.  Learner: Patient  Readiness: Acceptance  Method: Explanation  Response: Verbalizes Understanding    Education Comments  No comments found.        OP EDUCATION:  Education  Individual(s) Educated: Patient  Education Provided: Fall precautons, Risk and benefits of OT discussed with patient or other, POC discussed and agreed upon    Goals:  Encounter Problems       Encounter Problems (Active)       ADLs       Pt will be mod assist x 1 [facility] gown while seated EOB       Start:  03/10/25    Expected End:  03/24/25               BALANCE       Pt will be min assist x 1 seated EOB while performing grooming ADLs       Start:  03/10/25     Expected End:  03/24/25               TRANSFERS       Pt will be max assist x 2 seated EOB to BSC to complete toileting ADLs       Start:  03/10/25    Expected End:  03/24/25

## 2025-03-11 ENCOUNTER — APPOINTMENT (OUTPATIENT)
Dept: RADIOLOGY | Facility: HOSPITAL | Age: 70
DRG: 029 | End: 2025-03-11
Payer: MEDICARE

## 2025-03-11 LAB
ALBUMIN SERPL BCP-MCNC: 3.5 G/DL (ref 3.4–5)
ANION GAP SERPL CALC-SCNC: 12 MMOL/L (ref 10–20)
ATRIAL RATE: 133 BPM
ATRIAL RATE: 87 BPM
BUN SERPL-MCNC: 21 MG/DL (ref 6–23)
CALCIUM SERPL-MCNC: 8.6 MG/DL (ref 8.6–10.6)
CHLORIDE SERPL-SCNC: 104 MMOL/L (ref 98–107)
CO2 SERPL-SCNC: 26 MMOL/L (ref 21–32)
CREAT SERPL-MCNC: 1.02 MG/DL (ref 0.5–1.05)
EGFRCR SERPLBLD CKD-EPI 2021: 60 ML/MIN/1.73M*2
GLUCOSE BLD MANUAL STRIP-MCNC: 156 MG/DL (ref 74–99)
GLUCOSE BLD MANUAL STRIP-MCNC: 160 MG/DL (ref 74–99)
GLUCOSE BLD MANUAL STRIP-MCNC: 165 MG/DL (ref 74–99)
GLUCOSE BLD MANUAL STRIP-MCNC: 220 MG/DL (ref 74–99)
GLUCOSE BLD MANUAL STRIP-MCNC: 359 MG/DL (ref 74–99)
GLUCOSE SERPL-MCNC: 157 MG/DL (ref 74–99)
MAGNESIUM SERPL-MCNC: 2.01 MG/DL (ref 1.6–2.4)
P AXIS: 54 DEGREES
P AXIS: 61 DEGREES
P OFFSET: 179 MS
P OFFSET: 199 MS
P ONSET: 123 MS
P ONSET: 138 MS
PHOSPHATE SERPL-MCNC: 2 MG/DL (ref 2.5–4.9)
POTASSIUM SERPL-SCNC: 3.9 MMOL/L (ref 3.5–5.3)
PR INTERVAL: 156 MS
PR INTERVAL: 178 MS
Q ONSET: 212 MS
Q ONSET: 216 MS
QRS COUNT: 15 BEATS
QRS COUNT: 22 BEATS
QRS DURATION: 68 MS
QRS DURATION: 74 MS
QT INTERVAL: 290 MS
QT INTERVAL: 368 MS
QTC CALCULATION(BAZETT): 431 MS
QTC CALCULATION(BAZETT): 442 MS
QTC FREDERICIA: 377 MS
QTC FREDERICIA: 416 MS
R AXIS: 14 DEGREES
R AXIS: 8 DEGREES
SODIUM SERPL-SCNC: 138 MMOL/L (ref 136–145)
T AXIS: 58 DEGREES
T AXIS: 62 DEGREES
T OFFSET: 361 MS
T OFFSET: 396 MS
VENTRICULAR RATE: 133 BPM
VENTRICULAR RATE: 87 BPM

## 2025-03-11 PROCEDURE — 2500000004 HC RX 250 GENERAL PHARMACY W/ HCPCS (ALT 636 FOR OP/ED): Performed by: STUDENT IN AN ORGANIZED HEALTH CARE EDUCATION/TRAINING PROGRAM

## 2025-03-11 PROCEDURE — 2500000004 HC RX 250 GENERAL PHARMACY W/ HCPCS (ALT 636 FOR OP/ED)

## 2025-03-11 PROCEDURE — 82947 ASSAY GLUCOSE BLOOD QUANT: CPT

## 2025-03-11 PROCEDURE — 2500000001 HC RX 250 WO HCPCS SELF ADMINISTERED DRUGS (ALT 637 FOR MEDICARE OP): Performed by: STUDENT IN AN ORGANIZED HEALTH CARE EDUCATION/TRAINING PROGRAM

## 2025-03-11 PROCEDURE — 1200000002 HC GENERAL ROOM WITH TELEMETRY DAILY

## 2025-03-11 PROCEDURE — 97530 THERAPEUTIC ACTIVITIES: CPT | Mod: GP

## 2025-03-11 PROCEDURE — 80069 RENAL FUNCTION PANEL: CPT | Performed by: STUDENT IN AN ORGANIZED HEALTH CARE EDUCATION/TRAINING PROGRAM

## 2025-03-11 PROCEDURE — 97535 SELF CARE MNGMENT TRAINING: CPT | Mod: GO

## 2025-03-11 PROCEDURE — 72146 MRI CHEST SPINE W/O DYE: CPT

## 2025-03-11 PROCEDURE — 72148 MRI LUMBAR SPINE W/O DYE: CPT | Performed by: STUDENT IN AN ORGANIZED HEALTH CARE EDUCATION/TRAINING PROGRAM

## 2025-03-11 PROCEDURE — 2500000002 HC RX 250 W HCPCS SELF ADMINISTERED DRUGS (ALT 637 FOR MEDICARE OP, ALT 636 FOR OP/ED): Performed by: STUDENT IN AN ORGANIZED HEALTH CARE EDUCATION/TRAINING PROGRAM

## 2025-03-11 PROCEDURE — 83735 ASSAY OF MAGNESIUM: CPT | Performed by: STUDENT IN AN ORGANIZED HEALTH CARE EDUCATION/TRAINING PROGRAM

## 2025-03-11 PROCEDURE — 72148 MRI LUMBAR SPINE W/O DYE: CPT

## 2025-03-11 PROCEDURE — 99232 SBSQ HOSP IP/OBS MODERATE 35: CPT | Performed by: STUDENT IN AN ORGANIZED HEALTH CARE EDUCATION/TRAINING PROGRAM

## 2025-03-11 PROCEDURE — 72080 X-RAY EXAM THORACOLMB 2/> VW: CPT

## 2025-03-11 PROCEDURE — 36415 COLL VENOUS BLD VENIPUNCTURE: CPT | Performed by: STUDENT IN AN ORGANIZED HEALTH CARE EDUCATION/TRAINING PROGRAM

## 2025-03-11 PROCEDURE — 97530 THERAPEUTIC ACTIVITIES: CPT | Mod: GO

## 2025-03-11 PROCEDURE — 72146 MRI CHEST SPINE W/O DYE: CPT | Performed by: STUDENT IN AN ORGANIZED HEALTH CARE EDUCATION/TRAINING PROGRAM

## 2025-03-11 RX ORDER — DEXAMETHASONE SODIUM PHOSPHATE 10 MG/ML
10 INJECTION INTRAMUSCULAR; INTRAVENOUS EVERY 8 HOURS
Status: COMPLETED | OUTPATIENT
Start: 2025-03-11 | End: 2025-03-12

## 2025-03-11 RX ORDER — INSULIN GLARGINE 100 [IU]/ML
60 INJECTION, SOLUTION SUBCUTANEOUS NIGHTLY
Status: DISCONTINUED | OUTPATIENT
Start: 2025-03-11 | End: 2025-03-12

## 2025-03-11 RX ORDER — METOPROLOL TARTRATE 50 MG/1
50 TABLET ORAL 2 TIMES DAILY
Status: DISCONTINUED | OUTPATIENT
Start: 2025-03-11 | End: 2025-03-14 | Stop reason: HOSPADM

## 2025-03-11 RX ORDER — HYDROMORPHONE HYDROCHLORIDE 2 MG/1
4 TABLET ORAL EVERY 4 HOURS PRN
Status: DISCONTINUED | OUTPATIENT
Start: 2025-03-11 | End: 2025-03-14 | Stop reason: HOSPADM

## 2025-03-11 RX ORDER — INSULIN LISPRO 100 [IU]/ML
22 INJECTION, SOLUTION INTRAVENOUS; SUBCUTANEOUS
Status: DISCONTINUED | OUTPATIENT
Start: 2025-03-11 | End: 2025-03-12

## 2025-03-11 RX ADMIN — DEXAMETHASONE SODIUM PHOSPHATE 10 MG: 10 INJECTION INTRAMUSCULAR; INTRAVENOUS at 17:24

## 2025-03-11 RX ADMIN — HYDROMORPHONE HYDROCHLORIDE 0.4 MG: 1 INJECTION, SOLUTION INTRAMUSCULAR; INTRAVENOUS; SUBCUTANEOUS at 07:33

## 2025-03-11 RX ADMIN — VERAPAMIL HYDROCHLORIDE 120 MG: 120 TABLET, FILM COATED ORAL at 09:26

## 2025-03-11 RX ADMIN — LATANOPROST 1 DROP: 50 SOLUTION OPHTHALMIC at 09:26

## 2025-03-11 RX ADMIN — ACETAMINOPHEN 975 MG: 325 TABLET ORAL at 11:46

## 2025-03-11 RX ADMIN — HYDROMORPHONE HYDROCHLORIDE 4 MG: 2 TABLET ORAL at 17:23

## 2025-03-11 RX ADMIN — METOPROLOL TARTRATE 50 MG: 50 TABLET, FILM COATED ORAL at 22:02

## 2025-03-11 RX ADMIN — METOPROLOL TARTRATE 50 MG: 50 TABLET, FILM COATED ORAL at 04:30

## 2025-03-11 RX ADMIN — INSULIN LISPRO 20 UNITS: 100 INJECTION, SOLUTION INTRAVENOUS; SUBCUTANEOUS at 11:47

## 2025-03-11 RX ADMIN — ACETAMINOPHEN 975 MG: 325 TABLET ORAL at 17:23

## 2025-03-11 RX ADMIN — INSULIN LISPRO 22 UNITS: 100 INJECTION, SOLUTION INTRAVENOUS; SUBCUTANEOUS at 17:23

## 2025-03-11 RX ADMIN — GUAIFENESIN 600 MG: 600 TABLET ORAL at 21:59

## 2025-03-11 RX ADMIN — ENOXAPARIN SODIUM 40 MG: 100 INJECTION SUBCUTANEOUS at 22:09

## 2025-03-11 RX ADMIN — ASPIRIN 81 MG: 81 TABLET, COATED ORAL at 09:26

## 2025-03-11 RX ADMIN — GABAPENTIN 300 MG: 300 CAPSULE ORAL at 09:26

## 2025-03-11 RX ADMIN — INSULIN GLARGINE 60 UNITS: 100 INJECTION, SOLUTION SUBCUTANEOUS at 22:58

## 2025-03-11 RX ADMIN — METHOCARBAMOL 1000 MG: 500 TABLET ORAL at 07:33

## 2025-03-11 RX ADMIN — DEXAMETHASONE SODIUM PHOSPHATE 10 MG: 10 INJECTION INTRAMUSCULAR; INTRAVENOUS at 09:27

## 2025-03-11 RX ADMIN — LATANOPROST 1 DROP: 50 SOLUTION OPHTHALMIC at 21:59

## 2025-03-11 RX ADMIN — OXYCODONE HYDROCHLORIDE 10 MG: 10 TABLET ORAL at 04:01

## 2025-03-11 RX ADMIN — ACETAMINOPHEN 975 MG: 325 TABLET ORAL at 04:01

## 2025-03-11 RX ADMIN — SENNOSIDES 17.2 MG: 8.6 TABLET, FILM COATED ORAL at 21:59

## 2025-03-11 RX ADMIN — VERAPAMIL HYDROCHLORIDE 120 MG: 120 TABLET, FILM COATED ORAL at 22:58

## 2025-03-11 RX ADMIN — ROSUVASTATIN CALCIUM 40 MG: 40 TABLET, FILM COATED ORAL at 21:59

## 2025-03-11 RX ADMIN — METHOCARBAMOL 1000 MG: 500 TABLET ORAL at 17:23

## 2025-03-11 RX ADMIN — MONTELUKAST 10 MG: 10 TABLET, FILM COATED ORAL at 21:59

## 2025-03-11 RX ADMIN — INSULIN LISPRO 6 UNITS: 100 INJECTION, SOLUTION INTRAVENOUS; SUBCUTANEOUS at 17:23

## 2025-03-11 RX ADMIN — POLYETHYLENE GLYCOL 3350 17 G: 17 POWDER, FOR SOLUTION ORAL at 09:25

## 2025-03-11 RX ADMIN — GABAPENTIN 300 MG: 300 CAPSULE ORAL at 21:59

## 2025-03-11 RX ADMIN — INSULIN LISPRO 3 UNITS: 100 INJECTION, SOLUTION INTRAVENOUS; SUBCUTANEOUS at 11:47

## 2025-03-11 RX ADMIN — VERAPAMIL HYDROCHLORIDE 120 MG: 120 TABLET, FILM COATED ORAL at 17:24

## 2025-03-11 RX ADMIN — GUAIFENESIN 600 MG: 600 TABLET ORAL at 09:26

## 2025-03-11 ASSESSMENT — COGNITIVE AND FUNCTIONAL STATUS - GENERAL
EATING MEALS: A LITTLE
EATING MEALS: A LITTLE
HELP NEEDED FOR BATHING: TOTAL
MOBILITY SCORE: 8
DRESSING REGULAR UPPER BODY CLOTHING: TOTAL
WALKING IN HOSPITAL ROOM: TOTAL
MOVING TO AND FROM BED TO CHAIR: TOTAL
MOVING TO AND FROM BED TO CHAIR: TOTAL
TOILETING: TOTAL
DRESSING REGULAR UPPER BODY CLOTHING: A LOT
PERSONAL GROOMING: A LOT
MOBILITY SCORE: 7
MOVING FROM LYING ON BACK TO SITTING ON SIDE OF FLAT BED WITH BEDRAILS: A LOT
CLIMB 3 TO 5 STEPS WITH RAILING: TOTAL
DRESSING REGULAR LOWER BODY CLOTHING: TOTAL
DRESSING REGULAR LOWER BODY CLOTHING: TOTAL
HELP NEEDED FOR BATHING: TOTAL
TURNING FROM BACK TO SIDE WHILE IN FLAT BAD: A LOT
MOVING TO AND FROM BED TO CHAIR: TOTAL
DAILY ACTIVITIY SCORE: 9
STANDING UP FROM CHAIR USING ARMS: TOTAL
CLIMB 3 TO 5 STEPS WITH RAILING: TOTAL
EATING MEALS: A LOT
TOILETING: TOTAL
WALKING IN HOSPITAL ROOM: TOTAL
MOVING FROM LYING ON BACK TO SITTING ON SIDE OF FLAT BED WITH BEDRAILS: A LOT
STANDING UP FROM CHAIR USING ARMS: TOTAL
DRESSING REGULAR UPPER BODY CLOTHING: TOTAL
TURNING FROM BACK TO SIDE WHILE IN FLAT BAD: A LOT
CLIMB 3 TO 5 STEPS WITH RAILING: TOTAL
DRESSING REGULAR LOWER BODY CLOTHING: TOTAL
MOBILITY SCORE: 8
DAILY ACTIVITIY SCORE: 8
PERSONAL GROOMING: A LITTLE
WALKING IN HOSPITAL ROOM: TOTAL
MOVING FROM LYING ON BACK TO SITTING ON SIDE OF FLAT BED WITH BEDRAILS: A LOT
DAILY ACTIVITIY SCORE: 12
STANDING UP FROM CHAIR USING ARMS: TOTAL
PERSONAL GROOMING: A LOT
TURNING FROM BACK TO SIDE WHILE IN FLAT BAD: TOTAL
HELP NEEDED FOR BATHING: A LOT
TOILETING: TOTAL

## 2025-03-11 ASSESSMENT — PAIN SCALES - GENERAL
PAINLEVEL_OUTOF10: 9
PAINLEVEL_OUTOF10: 8
PAINLEVEL_OUTOF10: 8
PAINLEVEL_OUTOF10: 9
PAINLEVEL_OUTOF10: 8

## 2025-03-11 ASSESSMENT — PAIN DESCRIPTION - ORIENTATION: ORIENTATION: MID

## 2025-03-11 ASSESSMENT — PAIN DESCRIPTION - LOCATION
LOCATION: BACK

## 2025-03-11 ASSESSMENT — PAIN - FUNCTIONAL ASSESSMENT
PAIN_FUNCTIONAL_ASSESSMENT: 0-10

## 2025-03-11 ASSESSMENT — ACTIVITIES OF DAILY LIVING (ADL): HOME_MANAGEMENT_TIME_ENTRY: 13

## 2025-03-11 NOTE — PROGRESS NOTES
[facility]   HOSPITAL MEDICINE     PROGRESS NOTE       PATIENT NAME: Abbie Mayo   MRN: [id number]   SERVICE DATE: 03/11/25   SERVICE TIME: 2:44 PM      Hospital Medicine/Primary Attending: Dania Mondragon DO   LENGTH OF STAY: 4     CHIEF COMPLAINT: Cauda equina compression (Multi)     Assessment & Plan  Cauda equina compression (Multi)    Thoracic myelopathy    Ossification of ligamentum flavum    Thoracic spinal stenosis        Assessment/Plan      I reviewed:    All new and relevant labs and imaging   New EKGs  Consultant notes   Vitals Signs   Nursing notes       Ms. Mayo is a 68y/o lady with history of T2DM (A1C 10.6%, 9/2024), HTN, asthma, and chronic thoracic spinal stenosis c/b LE weakness and urinary incontinence who presented due to acute worsening of LE weakness. S/p T9-11 laminectomy with PSIF with Dr. Murguia (3/8). Drains still in place.     # Thoracic spinal stenosis  # LE weakness  # Urinary incontinence  Patient with 6mo history of slowly worsening back pain and LE weakness. Patient started developing urinary incontinence and was evaluated by neurosurgery on 8/2024. She was planned for surgery several times, but was unable to procedure due to an asthma exacerbation and then uncontrolled diabetes.   - MRI from [facility] with severe thoracic stenosis with cord compression and cord signal change at T9-10 and T10-11   - Ortho spine consulted, s/p T9-11 laminectomy with PSIF with Dr. Murguia (3/8)  - Will restart DVT ppx 3/11 PM  - Pain regimen: scheduled tylenol and robaxin 1000mg q8hr; oxy 10mg q4hr prn and IV dilaudid 0.4mg for breakthrough  - PT/OT consulted, recommended moderate intensity> SNF  -Ortho spine restarted dex today for 24 hours      # T2DM  A1C 10.6 (9/2024).   Home regimen is lantus 48U at bedtime + lispro 20U TIDAC with metformin. Sugars improved control today c/b steroid use (necessary given main issue above)  - Continue lantus 50U at  bedtime + lispro 20U TIDAC  -SSI   -Hypoglycemia protocol    -Blood glucose checks qAC & qHS   -Carbohydrate controlled diet     # HTN - continue home verapamil given sinus tachycardia, hold home losartan    # Asthma - continue home Breo + montelukast     CORE MEASURES  N: diabetic diet  P: subcutaneous lovenox  C: Full code, confirmed  HCP: Jorge, spouse [phone]   Dispo: SNF pending drain removal         I spent 35 minutes in professional medical decision making, face-to-face examination and counseling, care coordination, chart review, discussions with consultants, and care planning        DISPOSITION:  Functional Status Prior to Admit:     Medical Necessity for Continued Hospitalization yes      Plan of care discussed with:         Subjective   SUBJECTIVE:  Pt seen and examined.   Yes     Patient denies CP, SOB, fevers, chills, nausea, or emesis.         Objective      PHYSICAL EXAM: Patient Vitals for the past 24 hrs:   BP Temp Temp src Pulse Resp SpO2   03/11/25 1124 135/65 35.8 °C (96.4 °F) -- 73 18 94 %   03/11/25 0823 159/89 36.6 °C (97.9 °F) Temporal 103 18 96 %   03/11/25 0430 138/81 -- -- (!) 130 -- --   03/11/25 0331 132/76 36.4 °C (97.5 °F) Temporal (!) 125 19 92 %   03/10/25 2306 127/76 36.4 °C (97.5 °F) -- (!) 129 -- 93 %   03/10/25 1933 149/79 36.5 °C (97.7 °F) -- (!) 135 -- 93 %   03/10/25 1545 137/75 37.2 °C (99 °F) Temporal (!) 134 18 90 %   03/10/25 1500 -- -- -- -- -- 92 %      Physical Exam  Vitals and nursing note reviewed.   Constitutional:       General: She is not in acute distress.     Appearance: Normal appearance. She is not ill-appearing.   HENT:      Head: Normocephalic and atraumatic.   Pulmonary:      Effort: No respiratory distress.   Abdominal:      Tenderness: There is no guarding.   Skin:     Coloration: Skin is not jaundiced or pale.   Neurological:      Mental Status: She is alert and oriented to person, place, and time.   Psychiatric:         Mood and Affect: Mood normal.  "           MEDICATIONS:   Scheduled medications  acetaminophen, 975 mg, oral, q6h  aspirin, 81 mg, oral, Daily  dexAMETHasone, 10 mg, intravenous, q8h  enoxaparin, 40 mg, subcutaneous, q24h DG  fluticasone furoate-vilanteroL, 1 puff, inhalation, Daily  gabapentin, 300 mg, oral, BID  guaiFENesin, 600 mg, oral, BID  insulin glargine, 50 Units, subcutaneous, Nightly  insulin lispro, 0-15 Units, subcutaneous, TID AC  insulin lispro, 20 Units, subcutaneous, TID AC  latanoprost, 1 drop, Right Eye, BID  [Held by provider] losartan, 100 mg, oral, Daily  methocarbamol, 1,000 mg, oral, q8h DG  metoprolol tartrate, 50 mg, oral, BID  montelukast, 10 mg, oral, Nightly  polyethylene glycol, 17 g, oral, Daily  rosuvastatin, 40 mg, oral, Nightly  sennosides, 2 tablet, oral, Nightly  verapamil, 120 mg, oral, TID      Continuous medications     PRN medications  PRN medications: albuterol, benzonatate, dextrose, dextrose, glucagon, glucagon, melatonin, naloxone, oxyCODONE       DATA:      Diagnostic tests reviewed for today's visit:     CBC:   Results from last 72 hours   Lab Units 03/10/25  0630   WBC AUTO x10*3/uL 13.5*   HEMATOCRIT % 31.7*   PLATELETS AUTO x10*3/uL 198   MCV fL 85     Coags:     CMP:   Results from last 72 hours   Lab Units 03/11/25  0808   SODIUM mmol/L 138   POTASSIUM mmol/L 3.9   CO2 mmol/L 26   BUN mg/dL 21   MAGNESIUM mg/dL 2.01   ALBUMIN g/dL 3.5      Cardiac Enzymes: No lab exists for component: \"TROP\" .lab  Liver Function, Amylase, Lipase:       No lab exists for component: \"TPROT\", \"ALB\", \"TBILI\"   MG/PHOS: [id number](mg,p)@   Renal Panel:    Results from last 72 hours   Lab Units 03/11/25  0808   ALBUMIN g/dL 3.5   BUN mg/dL 21   POTASSIUM mmol/L 3.9   CO2 mmol/L 26   SODIUM mmol/L 138      Heme:        No lab exists for component: \"RETICP\", \"ABSRETIC\", \"LD\", \"AMARIS\", \"FE\", \"TRANSFERSAT\"   No components found for: \"UALBCR\"      Medication and Non-Pharmacologic VTE Prophylaxis/Anticoagulants    The " patient has received anticoagulants recently:  Heparin is ordered or has been given within the last 24 hours OR  Lovenox has been given in the last 24 hours OR  An anticoagulant other than Heparin or Lovenox is on the home med list OR  An anticoagulant other than Heparin or Lovenox has been given within the last 5 days  Last Anticoag Admin            Orders not given:    enoxaparin (Lovenox) syringe 40 mg                   SIGNATURE: Danai Mondragon [facility]    PAGER/CONTACT #: Epic Graciela      Disclaimer: Portions of this note may have been generated using Dragon voice recognition software. Reasonable efforts were made to correct any dictation errors that resulted due to the programming of this software but some may still be present.     Portions of this note including HPI, ROS, impression/plan, and examination may have been copied forward from yesterday to March 11, 2025 as to provide important historical information essential in contributing to medical decision making. Documentation has been reviewed and edited as necessary to support clinical decision making for today's visit and to reflect my own independent evaluation of this patient.       The time of this note does not reflect the time I saw the patient but the time that this note was written

## 2025-03-11 NOTE — PROGRESS NOTES
Occupational Therapy    OT Treatment    Patient Name: Abbie Mayo  MRN: [id number]  Department: [facility]  Room: [address] [place] [address]  Today's Date: 3/11/2025  Time Calculation  Start Time: 1224  Stop Time: 1250  Time Calculation (min): 26 min        Assessment:  OT Assessment:  (Continue to recommend moderate intensity OT to address ADLs, mobility, and endurance.)  Barriers to Discharge Home: Physical needs  End of Session Communication: Bedside nurse  End of Session Patient Position: Bed, 3 rail up, Alarm off, not on at start of session     Plan:  Treatment Interventions: ADL retraining, Functional transfer training, Endurance training, Patient/family training  OT Frequency: 2 times per week  OT Discharge Recommendations: Moderate intensity level of continued care  Equipment Recommended upon Discharge:  (TBD)  OT - OK to Discharge: Yes  Treatment Interventions: ADL retraining, Functional transfer training, Endurance training, Patient/family training    Subjective   Previous Visit Info:  OT Last Visit  OT Received On: 03/11/25  General:  General  Family/Caregiver Present: No  Co-Treatment: PT  Co-Treatment Reason:  (low AMPAC)  Prior to Session Communication: Bedside nurse  Patient Position Received: Bed, 3 rail up, Alarm off, not on at start of session  Preferred Learning Style: verbal, visual  General Comment:  (pleasant, motivated)  Precautions:  Medical Precautions: Fall precautions  Post-Surgical Precautions: Spinal precautions  Precautions Comment: MARIAN drain, prevena, naqvi cath      Vital Signs Comment:  (-111 while seated EOB)     Pain:  Pain Assessment  Pain Assessment: 0-10  0-10 (Numeric) Pain Score: 8  Pain Location: Back    Objective    Cognition:  Cognition  Overall Cognitive Status: Within Functional Limits  Orientation Level: Oriented X4  Following Commands: Follows one step commands without difficulty    Grooming  Grooming Level of Assistance: Close supervision, Setup  Grooming Where Assessed: Edge of  bed  Grooming Comments:  (with increased time/effort, pt able to open mouth wash bottle and gargled for ~1 min)    LE Dressing  LE Dressing: Yes  Sock Level of Assistance: Dependent  LE Dressing Where Assessed: Bed level     Bed Mobility/Transfers: Bed Mobility 1  Bed Mobility 1: Supine to sitting, Sitting to supine  Level of Assistance 1: Maximum assistance, +2  Bed Mobility Comments 1:  (log roll, assist with (B) LE/trunk)  Bed Mobility 2  Bed Mobility  2: Rolling left  Level of Assistance 2: Maximum assistance, +2  Bed Mobility 3  Bed Mobility 3: Scooting  Level of Assistance 3: Dependent, +2 (draw sheet, boosting towards HOB)    Sitting Balance:  Static Sitting Balance  Static Sitting-Level of Assistance:  (min-mod assist x 1, posterior lean noted)  Dynamic Sitting Balance  Dynamic Sitting-Level of Assistance:  (mod-max assist x 1 without (B) UE supported during grooming ADLs, heavy posterior lean noted)      Outcome Measures:[facility] Daily Activity  Putting on and taking off regular lower body clothing: Total  Bathing (including washing, rinsing, drying): A lot  Putting on and taking off regular upper body clothing: A lot  Toileting, which includes using toilet, bedpan or urinal: Total  Taking care of personal grooming such as brushing teeth: A little  Eating Meals: A little  Daily Activity - Total Score: 12   and Brief Confusion Assessment Method (bCAM)  CAM Result: CAM -    Education Documentation  Body Mechanics, taught by Sandra Melton OT at 3/11/2025  3:12 PM.  Learner: Patient  Readiness: Acceptance  Method: Explanation  Response: Verbalizes Understanding    Precautions, taught by Sandra Melton OT at 3/11/2025  3:12 PM.  Learner: Patient  Readiness: Acceptance  Method: Explanation  Response: Verbalizes Understanding    ADL Training, taught by Sandra Melton OT at 3/11/2025  3:12 PM.  Learner: Patient  Readiness: Acceptance  Method: Explanation  Response: Verbalizes Understanding    Education Comments  No  comments found.           Goals:  Encounter Problems       Encounter Problems (Active)       ADLs       Pt will be mod assist x 1 rc/doffing hospital gown while seated EOB (Progressing)       Start:  03/10/25    Expected End:  03/24/25               BALANCE       Pt will be min assist x 1 seated EOB while performing grooming ADLs (Progressing)       Start:  03/10/25    Expected End:  03/24/25               TRANSFERS       Pt will be max assist x 2 seated EOB to BSC to complete toileting ADLs (Progressing)       Start:  03/10/25    Expected End:  03/24/25

## 2025-03-11 NOTE — PROGRESS NOTES
Physical Therapy    Physical Therapy Treatment    Patient Name: Abbie Mayo  MRN: [id number]  Department: [place] [address]  Room: [address] [place] [address]  Today's Date: 3/11/2025  Time Calculation  Start Time: 1223  Stop Time: 1250  Time Calculation (min): 27 min         Assessment/Plan   PT Assessment  PT Assessment Results: Decreased strength, Impaired balance, Decreased mobility, Decreased coordination, Orthopedic restrictions, Pain  Barriers to Discharge Home: Caregiver assistance, Physical needs  Caregiver Assistance: Caregiver assistance needed per identified barriers - however, level of patient's required assistance exceeds assistance available at home  Physical Needs: Stair navigation into home limited by function/safety, 24hr mobility assistance needed, 24hr ADL assistance needed, High falls risk due to function or environment  End of Session Communication: Bedside nurse  Assessment Comment: Pt with improvements in balance/posture abilities in sitting EOB though does remain to need increased assist to complete mobility tasks and maintain sitting balance.  Will continue to benefit from skilled PT to progress with overall mobility.  End of Session Patient Position: Bed, 3 rail up, Alarm on     PT Plan  Treatment/Interventions: Bed mobility, Transfer training, Gait training, Balance training, Therapeutic exercise, Therapeutic activity  PT Plan: Ongoing PT  PT Frequency: Daily  PT Discharge Recommendations: Moderate intensity level of continued care  PT Recommended Transfer Status: Assist x2  PT - OK to Discharge: Yes (POC/goals/discharge rec intensity created)      General Visit Information:   PT  Visit  PT Received On: 03/11/25  General  Co-Treatment: OT  Co-Treatment Reason: for patient safety with mobility attempts,previous session AMPAC 7  Prior to Session Communication: Bedside nurse  General Comment: Pt agreeable to participate, demonstrates some improvements in mobility/strength and sitting balance though does remain in  need for heavy assist to complete mobility tasks.  Sat to EOB ranging from min-maxAx1 this session. Will continue to follow.    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, hearing appears WFL  Medical Precautions: Fall precautions  Post-Surgical Precautions: Spinal precautions  Precautions Comment: MARIAN drain, prevena, naqvi cath          Objective   Pain:  Pain Assessment  Pain Assessment: 0-10  0-10 (Numeric) Pain Score: 8  Pain Location: Back  Pain Interventions: Repositioned  Response to Interventions:  (appears comfortable at rest at end of session)  Cognition:  Cognition  Arousal/Alertness: Appropriate responses to stimuli  Following Commands: Follows one step commands with repetition  Cognition Comments: still with some tangential conversation during session though appears more focused/directed     Postural Control:  Postural Control  Postural Control: Impaired  Posture Comment: improved use of (B) UEs for support, able to attempt to self correct posterior leans  Static Sitting Balance  Static Sitting-Balance Support: Feet supported, Bilateral upper extremity supported  Static Sitting-Level of Assistance: Minimum assistance, Moderate assistance (x1)  Dynamic Sitting Balance  Dynamic Sitting-Balance Support: Feet supported, No upper extremity supported  Dynamic Sitting-Level of Assistance: Moderate assistance, Maximum assistance (x1)    Activity Tolerance:  Activity Tolerance  Endurance: Tolerates 10 - 20 min exercise with multiple rests  Treatments:  Therapeutic Exercise  Therapeutic Exercise Performed: Yes  Therapeutic Exercise Activity 1: (B) LE AAROM LAQ x 5 reps.  Pt needing much assist to complete on (R) LE vs (L).    Therapeutic Activity  Therapeutic Activity Performed: Yes  Therapeutic Activity 1: Sat to EOB ~20 minutes with PT focus on upright midline posture, cues to use (B) UEs to support self.  During dynamic activities with OT involving (B) UEs  pt unable to maintain upright posture and demos increasingly heavy lean posteriorly requiring mod-maxAx1 to maintain.    Bed Mobility  Bed Mobility: Yes  Bed Mobility 1  Bed Mobility 1: Supine to sitting  Level of Assistance 1: Maximum assistance, +2, Maximum verbal cues, Moderate tactile cues  Bed Mobility Comments 1: able to inititate (R) UE reaching to upper bed rail and turning trunk slightly more this date though needs heavy assist at (B) LEs for hip/knee flexion, use of draw sheet to roll, to guide hips/legs off EOB, heavy 2 person assist to bring trunk fully upright.  Bed Mobility 2  Bed Mobility  2: Sitting to supine  Level of Assistance 2: Maximum assistance, +2, Maximum verbal cues    Transfers  Transfer: No (focus of session on engaging core/trunk support in sitting/maintaining upright posture in sitting EOB.  Pt currently with (B) LEs weakness as well as need for increased assist levels to maintain sitting posture, unsafe to attempt standing at this time.)    Outcome Measures:  [facility] Basic Mobility  Turning from your back to your side while in a flat bed without using bedrails: A lot  Moving from lying on your back to sitting on the side of a flat bed without using bedrails: A lot  Moving to and from bed to chair (including a wheelchair): Total  Standing up from a chair using your arms (e.g. wheelchair or bedside chair): Total  To walk in hospital room: Total  Climbing 3-5 steps with railing: Total  Basic Mobility - Total Score: 8    Education Documentation  Precautions, taught by Nelsy Pate, PT at 3/11/2025  1:39 PM.  Learner: Patient  Readiness: Acceptance  Method: Explanation  Response: Verbalizes Understanding, Needs Reinforcement  Comment: spine precautions, progress with therapy, progression of mobility with each session    Mobility Training, taught by Nelsy Pate, PT at 3/11/2025  1:39 PM.  Learner: Patient  Readiness: Acceptance  Method: Explanation  Response: Verbalizes  Understanding, Needs Reinforcement  Comment: spine precautions, progress with therapy, progression of mobility with each session       Encounter Problems       Encounter Problems (Active)       Balance       STG - Maintains static sitting balance with upper extremity support with min Ax1.  (Progressing)       Start:  03/10/25    Expected End:  03/24/25            STG - Maintains dynamic sitting balance with upper extremity support with mod Ax1.  (Progressing)       Start:  03/10/25    Expected End:  03/24/25               PT Transfers       STG - Transfer from bed to chair mod Ax1.  (Not Progressing)       Start:  03/10/25    Expected End:  03/24/25            STG - Patient will perform bed mobility min Ax1.  (Progressing)       Start:  03/10/25    Expected End:  03/24/25            STG - Patient will transfer sit to and from stand mod Ax1.  (Not Progressing)       Start:  03/10/25    Expected End:  03/24/25 03/11/25 at 1:40 PM - Nelsy Pate, PT

## 2025-03-11 NOTE — SIGNIFICANT EVENT
Ortho Spine:    No change in subjective symptoms.     No significant change in spine exam.     MSK:  HV x1 and MARIAN x1 in place, HV to gravity     L1: SILT       L2: SILT      Hip flexors 2/5 Left; 2/5 Right  L3: SILT      Knee extension 2/5 Left; 2/5 Right  L4: SILT      Tib Ant. (Dorsiflexion) 5/5 Left; 5/5 Right  L5: SILT      EHL5/5 Left; 5/5 Right  S1: SILT      Plantar flexion 5/5 Left; 5/5 Right    Reviewed and approved by ROBY CALIX on 3/11/25 at 4:32 PM.     While admitted, this patient will be followed by the Ortho Spine Team, available via Epic Chat weekdays 6a-6p. Please page [phone] on nights and weekends.     Ortho Spine  First Call: John Colunga, PGY-2  Second Call: Alix Diaz, PGY-4  Third Call: Marquise Pastor, Fellow     Clinton Glez MD  Orthopedic Surgery, PGY-2  Available via Epic Chat

## 2025-03-11 NOTE — HOSPITAL COURSE
Ms. Mayo is a 68y/o lady with history of T2DM (A1C 10.6%, 9/2024), HTN, asthma, and chronic thoracic spinal stenosis c/b LE weakness and urinary incontinence who presented due to acute worsening of LE weakness. S/p T9-11 laminectomy with PSIF with Dr. Murguia (3/8).  On 3/11/2025 patient had a worse neurological exam.  Underwent stat MRI, which thankfully showed improved appearance of nerve compression.  Patient started on 24 hours of steroids, with rapid improvement in symptoms.  Drains have been removed 3/15/2025 in the PM.  Discharged in stable condition on 3/14/25.     # Thoracic spinal stenosis  # LE weakness  # Urinary incontinence  Patient with 6mo history of slowly worsening back pain and LE weakness. Patient started developing urinary incontinence and was evaluated by neurosurgery on 8/2024. She was planned for surgery several times, but was unable to procedure due to an asthma exacerbation and then uncontrolled diabetes.   - MRI from [facility] with severe thoracic stenosis with cord compression and cord signal change at T9-10 and T10-11   - Ortho spine consulted, s/p T9-11 laminectomy with PSIF with Dr. Murguia (3/8)  - PT/OT consulted, recommended moderate intensity> SNF  - s/p dex 10q8h for 24 hours 3/11/25 with improvement      # T2DM with hyperglycemia   A1C 10.6 (9/2024).   Home regimen is lantus 48U at bedtime + lispro 20U TIDAC with metformin. Sugars control while inpatient was c/b steroid use.  - resume lantus 50U at bedtime + lispro 20U TIDAC  -SSI   -Hypoglycemia protocol    -Blood glucose checks qAC & qHS   -Carbohydrate controlled diet     # HTN - continue home verapamil given sinus tachycardia, hold home losartan    # Asthma - continue home Breo + montelukast

## 2025-03-11 NOTE — PROGRESS NOTES
"Orthopaedic Surgery Progress Note    Subjective: NAEON. Tolerated HOB elevation to 30 no new headaches. Feels the spasms in her back have become significant.    Objective:  /81   Pulse (!) 130   Temp 36.4 °C (97.5 °F) (Temporal)   Resp 19   Ht 1.676 m (5' 5.98\")   Wt 99.7 kg (219 lb 12.8 oz)   SpO2 92%   BMI 35.49 kg/m²     Gen: arousable, NAD, appropriately conversational  Cardiac: RRR to peripheral palpation  Resp: nonlabored on RA  GI: soft, nondistended    MSK:  HV x1 and MARIAN x1 in place, HV to gravity    L1: SILT       L2: SILT      Hip flexors 3/5 Left; 2/5 Right  L3: SILT      Knee extension 2/5 Left; 2/5 Right  L4: SILT      Tib Ant. (Dorsiflexion) 5/5 Left; 5/5 Right  L5: SILT      EHL5/5 Left; 5/5 Right  S1: SILT      Plantar flexion 5/5 Left; 5/5 Right      Assessment/Plan: 69 y.o. female s/p T9-T11 PSIF and decompression, L2-3 laminectomy, dural repair on 3/8/25 with Dr. Murguia. Change in exam on 3/11, increased BLE weakness compared to last night.     Plan:  - Continue decadron IV 10mg q8h for 3 doses  - Call team to re-evaluate this afternoon  - Weight-bearing status: Ok to mobilize OOB ad samy  - Regular diet as tolerated, bowel regimen  - Multimodal pain  - OT/PT  - Reyna-operative Ancef for 48 hours  - MARIAN x1 to suction, Hvx1 to gravity, Maintain drain, monitor and record output q8 hrs  - Kelley: None   - DVT ppx: SCDs, early mobility. May resume DVT ppx 72h postop (3/11 PM)    Dispo: Pending drain, repeat exam     Plan was discussed with attending Dr. Murguia    While admitted, this patient will be followed by the Ortho Spine Team, available via Epic Chat weekdays 6a-6p. Please page [phone] on nights and weekends.    Ortho Spine  First Call: John Colunga, PGY-2  Second Call: Alix Diaz, PGY-4  Third Call: Marquise Pastor, Fellow    Clinton Glez MD  Orthopedic Surgery, PGY-2  Available via Epic Chat    "

## 2025-03-11 NOTE — CARE PLAN
The clinical goals for the shift include Patient's spO2 will be maintained >92% with no increased O2 requirements throughout shift      Problem: Pain - Adult  Goal: Verbalizes/displays adequate comfort level or baseline comfort level  Outcome: Progressing     Problem: Safety - Adult  Goal: Free from fall injury  Outcome: Progressing     Problem: Discharge Planning  Goal: Discharge to home or other facility with appropriate resources  Outcome: Progressing     Problem: Chronic Conditions and Co-morbidities  Goal: Patient's chronic conditions and co-morbidity symptoms are monitored and maintained or improved  Outcome: Progressing     Problem: Nutrition  Goal: Nutrient intake appropriate for maintaining nutritional needs  Outcome: Progressing     Problem: Skin  Goal: Decreased wound size/increased tissue granulation at next dressing change  Outcome: Progressing  Goal: Participates in plan/prevention/treatment measures  Outcome: Progressing  Goal: Prevent/manage excess moisture  Outcome: Progressing  Goal: Prevent/minimize sheer/friction injuries  Outcome: Progressing  Goal: Promote/optimize nutrition  Outcome: Progressing  Goal: Promote skin healing  Outcome: Progressing     Problem: Diabetes  Goal: Achieve decreasing blood glucose levels by end of shift  Outcome: Progressing  Goal: Increase stability of blood glucose readings by end of shift  Outcome: Progressing  Goal: Decrease in ketones present in urine by end of shift  Outcome: Progressing  Goal: Maintain electrolyte levels within acceptable range throughout shift  Outcome: Progressing  Goal: Maintain glucose levels >70mg/dl to <250mg/dl throughout shift  Outcome: Progressing  Goal: No changes in neurological exam by end of shift  Outcome: Progressing  Goal: Learn about and adhere to nutrition recommendations by end of shift  Outcome: Progressing  Goal: Vital signs within normal range for age by end of shift  Outcome: Progressing  Goal: Increase self care and/or  family involovement by end of shift  Outcome: Progressing  Goal: Receive DSME education by end of shift  Outcome: Progressing     Problem: Pain  Goal: Takes deep breaths with improved pain control throughout the shift  Outcome: Progressing  Goal: Turns in bed with improved pain control throughout the shift  Outcome: Progressing  Goal: Walks with improved pain control throughout the shift  Outcome: Progressing  Goal: Performs ADL's with improved pain control throughout shift  Outcome: Progressing  Goal: Participates in PT with improved pain control throughout the shift  Outcome: Progressing  Goal: Free from opioid side effects throughout the shift  Outcome: Progressing  Goal: Free from acute confusion related to pain meds throughout the shift  Outcome: Progressing

## 2025-03-11 NOTE — PROGRESS NOTES
SW spoke with patient's spouse via phone today to discuss SNF choices. He was hopeful patient could go to [facility] which is an acute rehab. SW explained level of care recommendations and he is agreeable to SNF. Went over choice list. He gave permission to send referrals to 5 facilities in the [facility]. Does not have FOC at this time, he just wants to see which facilities are able to accommodate patient before making a decision. Referrals sent at this time.     JOE Dodson

## 2025-03-12 LAB
ALBUMIN SERPL BCP-MCNC: 3.4 G/DL (ref 3.4–5)
ANION GAP SERPL CALC-SCNC: 15 MMOL/L (ref 10–20)
BUN SERPL-MCNC: 24 MG/DL (ref 6–23)
CALCIUM SERPL-MCNC: 8.6 MG/DL (ref 8.6–10.6)
CHLORIDE SERPL-SCNC: 100 MMOL/L (ref 98–107)
CO2 SERPL-SCNC: 25 MMOL/L (ref 21–32)
CREAT SERPL-MCNC: 0.9 MG/DL (ref 0.5–1.05)
EGFRCR SERPLBLD CKD-EPI 2021: 70 ML/MIN/1.73M*2
ERYTHROCYTE [DISTWIDTH] IN BLOOD BY AUTOMATED COUNT: 12.8 % (ref 11.5–14.5)
GLUCOSE BLD MANUAL STRIP-MCNC: 338 MG/DL (ref 74–99)
GLUCOSE BLD MANUAL STRIP-MCNC: 341 MG/DL (ref 74–99)
GLUCOSE BLD MANUAL STRIP-MCNC: 346 MG/DL (ref 74–99)
GLUCOSE BLD MANUAL STRIP-MCNC: 464 MG/DL (ref 74–99)
GLUCOSE BLD MANUAL STRIP-MCNC: 539 MG/DL (ref 74–99)
GLUCOSE SERPL-MCNC: 366 MG/DL (ref 74–99)
HCT VFR BLD AUTO: 31.3 % (ref 36–46)
HGB BLD-MCNC: 10.2 G/DL (ref 12–16)
MCH RBC QN AUTO: 28.3 PG (ref 26–34)
MCHC RBC AUTO-ENTMCNC: 32.6 G/DL (ref 32–36)
MCV RBC AUTO: 87 FL (ref 80–100)
NRBC BLD-RTO: 0 /100 WBCS (ref 0–0)
PHOSPHATE SERPL-MCNC: 2.8 MG/DL (ref 2.5–4.9)
PLATELET # BLD AUTO: 259 X10*3/UL (ref 150–450)
POTASSIUM SERPL-SCNC: 4.6 MMOL/L (ref 3.5–5.3)
RBC # BLD AUTO: 3.61 X10*6/UL (ref 4–5.2)
SODIUM SERPL-SCNC: 135 MMOL/L (ref 136–145)
WBC # BLD AUTO: 12.4 X10*3/UL (ref 4.4–11.3)

## 2025-03-12 PROCEDURE — 2500000002 HC RX 250 W HCPCS SELF ADMINISTERED DRUGS (ALT 637 FOR MEDICARE OP, ALT 636 FOR OP/ED): Performed by: STUDENT IN AN ORGANIZED HEALTH CARE EDUCATION/TRAINING PROGRAM

## 2025-03-12 PROCEDURE — 2500000001 HC RX 250 WO HCPCS SELF ADMINISTERED DRUGS (ALT 637 FOR MEDICARE OP): Performed by: STUDENT IN AN ORGANIZED HEALTH CARE EDUCATION/TRAINING PROGRAM

## 2025-03-12 PROCEDURE — 2500000004 HC RX 250 GENERAL PHARMACY W/ HCPCS (ALT 636 FOR OP/ED): Performed by: STUDENT IN AN ORGANIZED HEALTH CARE EDUCATION/TRAINING PROGRAM

## 2025-03-12 PROCEDURE — 1200000002 HC GENERAL ROOM WITH TELEMETRY DAILY

## 2025-03-12 PROCEDURE — 2500000004 HC RX 250 GENERAL PHARMACY W/ HCPCS (ALT 636 FOR OP/ED)

## 2025-03-12 PROCEDURE — 80069 RENAL FUNCTION PANEL: CPT | Performed by: STUDENT IN AN ORGANIZED HEALTH CARE EDUCATION/TRAINING PROGRAM

## 2025-03-12 PROCEDURE — 82947 ASSAY GLUCOSE BLOOD QUANT: CPT

## 2025-03-12 PROCEDURE — 36415 COLL VENOUS BLD VENIPUNCTURE: CPT | Performed by: STUDENT IN AN ORGANIZED HEALTH CARE EDUCATION/TRAINING PROGRAM

## 2025-03-12 PROCEDURE — 94640 AIRWAY INHALATION TREATMENT: CPT

## 2025-03-12 PROCEDURE — 99232 SBSQ HOSP IP/OBS MODERATE 35: CPT | Performed by: STUDENT IN AN ORGANIZED HEALTH CARE EDUCATION/TRAINING PROGRAM

## 2025-03-12 PROCEDURE — 85027 COMPLETE CBC AUTOMATED: CPT | Performed by: STUDENT IN AN ORGANIZED HEALTH CARE EDUCATION/TRAINING PROGRAM

## 2025-03-12 RX ORDER — INSULIN LISPRO 100 [IU]/ML
25 INJECTION, SOLUTION INTRAVENOUS; SUBCUTANEOUS
Status: DISCONTINUED | OUTPATIENT
Start: 2025-03-13 | End: 2025-03-13

## 2025-03-12 RX ORDER — SODIUM CHLORIDE, SODIUM LACTATE, POTASSIUM CHLORIDE, CALCIUM CHLORIDE 600; 310; 30; 20 MG/100ML; MG/100ML; MG/100ML; MG/100ML
100 INJECTION, SOLUTION INTRAVENOUS CONTINUOUS
Status: ACTIVE | OUTPATIENT
Start: 2025-03-12 | End: 2025-03-13

## 2025-03-12 RX ORDER — INSULIN GLARGINE 100 [IU]/ML
70 INJECTION, SOLUTION SUBCUTANEOUS NIGHTLY
Status: DISCONTINUED | OUTPATIENT
Start: 2025-03-12 | End: 2025-03-13

## 2025-03-12 RX ORDER — INSULIN LISPRO 100 [IU]/ML
45 INJECTION, SOLUTION INTRAVENOUS; SUBCUTANEOUS ONCE
Status: COMPLETED | OUTPATIENT
Start: 2025-03-12 | End: 2025-03-12

## 2025-03-12 RX ORDER — INSULIN LISPRO 100 [IU]/ML
8 INJECTION, SOLUTION INTRAVENOUS; SUBCUTANEOUS ONCE
Status: COMPLETED | OUTPATIENT
Start: 2025-03-12 | End: 2025-03-12

## 2025-03-12 RX ADMIN — ASPIRIN 81 MG: 81 TABLET, COATED ORAL at 08:30

## 2025-03-12 RX ADMIN — METHOCARBAMOL 1000 MG: 500 TABLET ORAL at 01:00

## 2025-03-12 RX ADMIN — METOPROLOL TARTRATE 50 MG: 50 TABLET, FILM COATED ORAL at 21:23

## 2025-03-12 RX ADMIN — SODIUM CHLORIDE, POTASSIUM CHLORIDE, SODIUM LACTATE AND CALCIUM CHLORIDE 500 ML: 600; 310; 30; 20 INJECTION, SOLUTION INTRAVENOUS at 21:23

## 2025-03-12 RX ADMIN — GABAPENTIN 300 MG: 300 CAPSULE ORAL at 21:22

## 2025-03-12 RX ADMIN — INSULIN LISPRO 12 UNITS: 100 INJECTION, SOLUTION INTRAVENOUS; SUBCUTANEOUS at 13:03

## 2025-03-12 RX ADMIN — INSULIN LISPRO 45 UNITS: 100 INJECTION, SOLUTION INTRAVENOUS; SUBCUTANEOUS at 21:24

## 2025-03-12 RX ADMIN — ACETAMINOPHEN 975 MG: 325 TABLET ORAL at 01:00

## 2025-03-12 RX ADMIN — INSULIN GLARGINE 70 UNITS: 100 INJECTION, SOLUTION SUBCUTANEOUS at 21:24

## 2025-03-12 RX ADMIN — ACETAMINOPHEN 975 MG: 325 TABLET ORAL at 13:08

## 2025-03-12 RX ADMIN — GUAIFENESIN 600 MG: 600 TABLET ORAL at 21:23

## 2025-03-12 RX ADMIN — MONTELUKAST 10 MG: 10 TABLET, FILM COATED ORAL at 21:22

## 2025-03-12 RX ADMIN — VERAPAMIL HYDROCHLORIDE 120 MG: 120 TABLET, FILM COATED ORAL at 08:39

## 2025-03-12 RX ADMIN — HYDROMORPHONE HYDROCHLORIDE 4 MG: 2 TABLET ORAL at 13:11

## 2025-03-12 RX ADMIN — METOPROLOL TARTRATE 50 MG: 50 TABLET, FILM COATED ORAL at 08:29

## 2025-03-12 RX ADMIN — ROSUVASTATIN CALCIUM 40 MG: 40 TABLET, FILM COATED ORAL at 21:22

## 2025-03-12 RX ADMIN — METHOCARBAMOL 1000 MG: 500 TABLET ORAL at 17:07

## 2025-03-12 RX ADMIN — GUAIFENESIN 600 MG: 600 TABLET ORAL at 08:29

## 2025-03-12 RX ADMIN — HYDROMORPHONE HYDROCHLORIDE 4 MG: 2 TABLET ORAL at 06:08

## 2025-03-12 RX ADMIN — ACETAMINOPHEN 975 MG: 325 TABLET ORAL at 06:08

## 2025-03-12 RX ADMIN — GABAPENTIN 300 MG: 300 CAPSULE ORAL at 08:29

## 2025-03-12 RX ADMIN — FLUTICASONE FUROATE AND VILANTEROL TRIFENATATE 1 PUFF: 200; 25 POWDER RESPIRATORY (INHALATION) at 09:10

## 2025-03-12 RX ADMIN — VERAPAMIL HYDROCHLORIDE 120 MG: 120 TABLET, FILM COATED ORAL at 17:07

## 2025-03-12 RX ADMIN — VERAPAMIL HYDROCHLORIDE 120 MG: 120 TABLET, FILM COATED ORAL at 21:23

## 2025-03-12 RX ADMIN — INSULIN LISPRO 8 UNITS: 100 INJECTION, SOLUTION INTRAVENOUS; SUBCUTANEOUS at 18:18

## 2025-03-12 RX ADMIN — DEXAMETHASONE SODIUM PHOSPHATE 10 MG: 10 INJECTION INTRAMUSCULAR; INTRAVENOUS at 01:01

## 2025-03-12 RX ADMIN — INSULIN LISPRO 15 UNITS: 100 INJECTION, SOLUTION INTRAVENOUS; SUBCUTANEOUS at 18:18

## 2025-03-12 RX ADMIN — LATANOPROST 1 DROP: 50 SOLUTION OPHTHALMIC at 09:00

## 2025-03-12 RX ADMIN — INSULIN LISPRO 22 UNITS: 100 INJECTION, SOLUTION INTRAVENOUS; SUBCUTANEOUS at 13:04

## 2025-03-12 RX ADMIN — SODIUM CHLORIDE, POTASSIUM CHLORIDE, SODIUM LACTATE AND CALCIUM CHLORIDE 100 ML/HR: 600; 310; 30; 20 INJECTION, SOLUTION INTRAVENOUS at 21:24

## 2025-03-12 RX ADMIN — METHOCARBAMOL 1000 MG: 500 TABLET ORAL at 08:30

## 2025-03-12 RX ADMIN — POLYETHYLENE GLYCOL 3350 17 G: 17 POWDER, FOR SOLUTION ORAL at 08:30

## 2025-03-12 RX ADMIN — SENNOSIDES 17.2 MG: 8.6 TABLET, FILM COATED ORAL at 21:21

## 2025-03-12 RX ADMIN — LATANOPROST 1 DROP: 50 SOLUTION OPHTHALMIC at 21:23

## 2025-03-12 RX ADMIN — INSULIN LISPRO 22 UNITS: 100 INJECTION, SOLUTION INTRAVENOUS; SUBCUTANEOUS at 18:18

## 2025-03-12 ASSESSMENT — COGNITIVE AND FUNCTIONAL STATUS - GENERAL
MOVING FROM LYING ON BACK TO SITTING ON SIDE OF FLAT BED WITH BEDRAILS: A LOT
TOILETING: TOTAL
DAILY ACTIVITIY SCORE: 15
DRESSING REGULAR UPPER BODY CLOTHING: A LOT
STANDING UP FROM CHAIR USING ARMS: TOTAL
MOBILITY SCORE: 8
STANDING UP FROM CHAIR USING ARMS: A LOT
TOILETING: A LOT
WALKING IN HOSPITAL ROOM: TOTAL
PERSONAL GROOMING: A LITTLE
MOBILITY SCORE: 11
DRESSING REGULAR LOWER BODY CLOTHING: A LOT
TURNING FROM BACK TO SIDE WHILE IN FLAT BAD: A LOT
WALKING IN HOSPITAL ROOM: A LOT
HELP NEEDED FOR BATHING: A LOT
DAILY ACTIVITIY SCORE: 8
DRESSING REGULAR LOWER BODY CLOTHING: TOTAL
PERSONAL GROOMING: A LOT
HELP NEEDED FOR BATHING: TOTAL
TURNING FROM BACK TO SIDE WHILE IN FLAT BAD: A LOT
MOVING TO AND FROM BED TO CHAIR: TOTAL
EATING MEALS: A LOT
MOVING FROM LYING ON BACK TO SITTING ON SIDE OF FLAT BED WITH BEDRAILS: A LOT
CLIMB 3 TO 5 STEPS WITH RAILING: TOTAL
DRESSING REGULAR UPPER BODY CLOTHING: TOTAL
MOVING TO AND FROM BED TO CHAIR: A LOT
CLIMB 3 TO 5 STEPS WITH RAILING: TOTAL

## 2025-03-12 ASSESSMENT — PAIN SCALES - GENERAL
PAINLEVEL_OUTOF10: 5 - MODERATE PAIN
PAINLEVEL_OUTOF10: 8
PAINLEVEL_OUTOF10: 9

## 2025-03-12 ASSESSMENT — PAIN DESCRIPTION - ORIENTATION: ORIENTATION: MID

## 2025-03-12 ASSESSMENT — PAIN DESCRIPTION - LOCATION
LOCATION: BACK
LOCATION: BACK

## 2025-03-12 NOTE — PROGRESS NOTES
"Orthopaedic Surgery Progress Note    Subjective: ALEX. States her pain has improved today, feels less week and in higher spirits.    MRI overnight showing expected post-surgical changes without evidence of new cord compression.    Objective:  /69   Pulse 72   Temp 36.3 °C (97.3 °F) (Temporal)   Resp 18   Ht 1.676 m (5' 5.98\")   Wt 99.7 kg (219 lb 12.8 oz)   SpO2 99%   BMI 35.49 kg/m²     Gen: arousable, NAD, appropriately conversational  Cardiac: RRR to peripheral palpation  Resp: nonlabored on RA  GI: soft, nondistended    MSK:  MARIAN x1 in place    L1: SILT       L2: SILT      Hip flexors 3/5 Left; 3/5 Right  L3: SILT      Knee extension 2/5 Left; 3/5 Right  L4: SILT      Tib Ant. (Dorsiflexion) 5/5 Left; 5/5 Right  L5: SILT      EHL5/5 Left; 5/5 Right  S1: SILT      Plantar flexion 5/5 Left; 5/5 Right      Assessment/Plan: 69 y.o. female s/p T9-T11 PSIF and decompression, L2-3 laminectomy, dural repair on 3/8/25 with Dr. Murguia. Change in exam on 3/11, increased BLE weakness improved with decadron. Repeat MRI without evidence of new cord compression.    Plan:  - Decadron IV 10mg q8h for 3 doses (finished)  - Weight-bearing status: Ok to mobilize OOB ad samy  - Regular diet as tolerated, bowel regimen  - Multimodal pain  - OT/PT  - Reyna-operative Ancef for 48 hours  - MARIAN x1 to suction, Maintain drain, monitor and record output q8 hrs  - Kelley: None   - DVT ppx: SCDs, early mobility. May resume DVT ppx 72h postop (3/11 PM)    Dispo: Pending drain    Plan was discussed with attending Dr. Murguia    While admitted, this patient will be followed by the Ortho Spine Team, available via Epic Chat weekdays 6a-6p. Please page [phone] on nights and weekends.    Ortho Spine  First Call: John Colunga, PGY-2  Second Call: Alix Diaz, PGY-4  Third Call: Marquise Pastor, Fellow    Clinton Glez MD  Orthopedic Surgery, PGY-2  Available via Epic Chat    "

## 2025-03-12 NOTE — CARE PLAN
The patient's goals for the shift include        Problem: Pain - Adult  Goal: Verbalizes/displays adequate comfort level or baseline comfort level  Outcome: Progressing     Problem: Safety - Adult  Goal: Free from fall injury  Outcome: Progressing     Problem: Discharge Planning  Goal: Discharge to home or other facility with appropriate resources  Outcome: Progressing     Problem: Chronic Conditions and Co-morbidities  Goal: Patient's chronic conditions and co-morbidity symptoms are monitored and maintained or improved  Outcome: Progressing     Problem: Nutrition  Goal: Nutrient intake appropriate for maintaining nutritional needs  Outcome: Progressing     Problem: Skin  Goal: Decreased wound size/increased tissue granulation at next dressing change  Outcome: Progressing  Flowsheets (Taken 3/12/2025 1113)  Decreased wound size/increased tissue granulation at next dressing change: Promote sleep for wound healing  Goal: Participates in plan/prevention/treatment measures  Outcome: Progressing  Flowsheets (Taken 3/12/2025 1113)  Participates in plan/prevention/treatment measures: Elevate heels  Goal: Prevent/manage excess moisture  Outcome: Progressing  Flowsheets (Taken 3/12/2025 1113)  Prevent/manage excess moisture: Moisturize dry skin  Goal: Prevent/minimize sheer/friction injuries  Outcome: Progressing  Flowsheets (Taken 3/12/2025 1113)  Prevent/minimize sheer/friction injuries: Use pull sheet  Goal: Promote/optimize nutrition  Outcome: Progressing  Flowsheets (Taken 3/12/2025 1113)  Promote/optimize nutrition: Consume > 50% meals/supplements  Goal: Promote skin healing  Outcome: Progressing  Flowsheets (Taken 3/12/2025 1113)  Promote skin healing: Turn/reposition every 2 hours/use positioning/transfer devices     Problem: Diabetes  Goal: Achieve decreasing blood glucose levels by end of shift  Outcome: Progressing  Flowsheets (Taken 3/12/2025 1113)  Achieve decreasing blood glucose levels by end of shift: Med  administration/monitoring of effect  Goal: Increase stability of blood glucose readings by end of shift  Outcome: Progressing  Goal: Decrease in ketones present in urine by end of shift  Outcome: Progressing  Goal: Maintain electrolyte levels within acceptable range throughout shift  Outcome: Progressing  Goal: Maintain glucose levels >70mg/dl to <250mg/dl throughout shift  Outcome: Progressing  Goal: No changes in neurological exam by end of shift  Outcome: Progressing  Goal: Learn about and adhere to nutrition recommendations by end of shift  Outcome: Progressing  Goal: Vital signs within normal range for age by end of shift  Outcome: Progressing  Goal: Increase self care and/or family involovement by end of shift  Outcome: Progressing  Goal: Receive DSME education by end of shift  Outcome: Progressing

## 2025-03-12 NOTE — NURSING NOTE
Nursing event note:    MRI screenings were completed and answered by patient. Patient is A&Ox4 and did not disclose that she had metal in her body and denied any other foreign object. Marisol Walton from MRI sent a secure message stating patient does have metal and patient has to be moved to another scanner.     Delia Esposito RN

## 2025-03-12 NOTE — CARE PLAN
The clinical goals for the shift include manage pain      Problem: Pain - Adult  Goal: Verbalizes/displays adequate comfort level or baseline comfort level  Outcome: Progressing     Problem: Safety - Adult  Goal: Free from fall injury  Outcome: Progressing     Problem: Discharge Planning  Goal: Discharge to home or other facility with appropriate resources  Outcome: Progressing     Problem: Chronic Conditions and Co-morbidities  Goal: Patient's chronic conditions and co-morbidity symptoms are monitored and maintained or improved  Outcome: Progressing     Problem: Nutrition  Goal: Nutrient intake appropriate for maintaining nutritional needs  Outcome: Progressing     Problem: Skin  Goal: Decreased wound size/increased tissue granulation at next dressing change  Outcome: Progressing  Goal: Participates in plan/prevention/treatment measures  Outcome: Progressing  Goal: Prevent/manage excess moisture  Outcome: Progressing  Goal: Prevent/minimize sheer/friction injuries  Outcome: Progressing  Goal: Promote/optimize nutrition  Outcome: Progressing  Goal: Promote skin healing  Outcome: Progressing     Problem: Diabetes  Goal: Achieve decreasing blood glucose levels by end of shift  Outcome: Progressing  Goal: Increase stability of blood glucose readings by end of shift  Outcome: Progressing  Goal: Decrease in ketones present in urine by end of shift  Outcome: Progressing  Goal: Maintain electrolyte levels within acceptable range throughout shift  Outcome: Progressing  Goal: Maintain glucose levels >70mg/dl to <250mg/dl throughout shift  Outcome: Progressing  Goal: No changes in neurological exam by end of shift  Outcome: Progressing  Goal: Learn about and adhere to nutrition recommendations by end of shift  Outcome: Progressing  Goal: Vital signs within normal range for age by end of shift  Outcome: Progressing  Goal: Increase self care and/or family involovement by end of shift  Outcome: Progressing  Goal: Receive DSME  education by end of shift  Outcome: Progressing     Problem: Pain  Goal: Takes deep breaths with improved pain control throughout the shift  Outcome: Progressing  Goal: Turns in bed with improved pain control throughout the shift  Outcome: Progressing  Goal: Walks with improved pain control throughout the shift  Outcome: Progressing  Goal: Performs ADL's with improved pain control throughout shift  Outcome: Progressing  Goal: Participates in PT with improved pain control throughout the shift  Outcome: Progressing  Goal: Free from opioid side effects throughout the shift  Outcome: Progressing  Goal: Free from acute confusion related to pain meds throughout the shift  Outcome: Progressing

## 2025-03-12 NOTE — PROGRESS NOTES
[facility]   HOSPITAL MEDICINE     PROGRESS NOTE       PATIENT NAME: Abbie Mayo   MRN: [id number]   SERVICE DATE: 03/12/25   SERVICE TIME: 3:00 PM      Hospital Medicine/Primary Attending: Dania Mondragon DO   LENGTH OF STAY: 5     CHIEF COMPLAINT: Cauda equina compression (Multi)     Assessment & Plan  Cauda equina compression (Multi)    Thoracic myelopathy    Ossification of ligamentum flavum    Thoracic spinal stenosis        Assessment/Plan      I reviewed:    All new and relevant labs and imaging   New EKGs  Consultant notes   Vitals Signs   Nursing notes       Ms. Mayo is a 70y/o lady with history of T2DM (A1C 10.6%, 9/2024), HTN, asthma, and chronic thoracic spinal stenosis c/b LE weakness and urinary incontinence who presented due to acute worsening of LE weakness. S/p T9-11 laminectomy with PSIF with Dr. Murguia (3/8). Drains still in place.     # Thoracic spinal stenosis  # LE weakness  # Urinary incontinence  Patient with 6mo history of slowly worsening back pain and LE weakness. Patient started developing urinary incontinence and was evaluated by neurosurgery on 8/2024. She was planned for surgery several times, but was unable to procedure due to an asthma exacerbation and then uncontrolled diabetes.   - MRI from [facility] with severe thoracic stenosis with cord compression and cord signal change at T9-10 and T10-11   - Ortho spine consulted, s/p T9-11 laminectomy with PSIF with Dr. Murguia (3/8)  - Will restart DVT ppx 3/11 PM  - Pain regimen: scheduled tylenol and robaxin 1000mg q8hr; oxy 10mg q4hr prn and IV dilaudid 0.4mg for breakthrough  - PT/OT consulted, recommended moderate intensity> SNF  -s/p dex 10q8h for 24 hours 3/11/25 feeling better today   -MRI discussed with spine team looks good. No more intervention as of now     # T2DM with hyperglycemia worse 2/2 steroids expect improvement now off steroids  A1C 10.6 (9/2024).   Home regimen is lantus  48U at bedtime + lispro 20U TIDAC with metformin. Sugars improved control today c/b steroid use (necessary given main issue above)  - Increase lantus 50>60U at bedtime + lispro 22U TIDAC  -SSI   -Hypoglycemia protocol    -Blood glucose checks qAC & qHS   -Carbohydrate controlled diet     # HTN - continue home verapamil given sinus tachycardia, hold home losartan    # Asthma - continue home Breo + montelukast     CORE MEASURES  N: diabetic diet  P: subcutaneous lovenox  C: Full code, confirmed  HCP: Jorge, spouse [phone]   Dispo: SNF pending drain removal         I spent 35 minutes in professional medical decision making, face-to-face examination and counseling, care coordination, chart review, discussions with consultants, and care planning        DISPOSITION:  Functional Status Prior to Admit:     Medical Necessity for Continued Hospitalization yes      Plan of care discussed with:         Subjective   SUBJECTIVE:  Pt seen and examined.   Yes     Patient denies CP, SOB, fevers, chills, nausea, or emesis.         Objective      PHYSICAL EXAM: Patient Vitals for the past 24 hrs:   BP Temp Temp src Pulse Resp SpO2   03/12/25 1100 134/80 36 °C (96.8 °F) -- 69 17 97 %   03/12/25 0751 147/70 36 °C (96.8 °F) -- 76 18 96 %   03/12/25 0500 -- -- -- -- 18 --   03/12/25 0353 134/69 36.3 °C (97.3 °F) Temporal 72 18 99 %   03/12/25 0006 117/66 36.1 °C (97 °F) Temporal 69 18 99 %   03/11/25 2202 151/74 36.3 °C (97.3 °F) Temporal 102 18 97 %   03/11/25 1615 131/77 36.4 °C (97.5 °F) Temporal 109 18 95 %      Physical Exam  Vitals and nursing note reviewed.   Constitutional:       General: She is not in acute distress.     Appearance: Normal appearance. She is not ill-appearing.   HENT:      Head: Normocephalic and atraumatic.   Pulmonary:      Effort: No respiratory distress.   Abdominal:      Tenderness: There is no guarding.   Skin:     Coloration: Skin is not jaundiced or pale.   Neurological:      Mental Status: She is  "alert and oriented to person, place, and time.   Psychiatric:         Mood and Affect: Mood normal.            MEDICATIONS:   Scheduled medications  acetaminophen, 975 mg, oral, q6h  aspirin, 81 mg, oral, Daily  enoxaparin, 40 mg, subcutaneous, q24h DG  fluticasone furoate-vilanteroL, 1 puff, inhalation, Daily  gabapentin, 300 mg, oral, BID  guaiFENesin, 600 mg, oral, BID  insulin glargine, 60 Units, subcutaneous, Nightly  insulin lispro, 0-15 Units, subcutaneous, TID AC  insulin lispro, 22 Units, subcutaneous, TID AC  latanoprost, 1 drop, Right Eye, BID  [Held by provider] losartan, 100 mg, oral, Daily  methocarbamol, 1,000 mg, oral, q8h DG  metoprolol tartrate, 50 mg, oral, BID  montelukast, 10 mg, oral, Nightly  polyethylene glycol, 17 g, oral, Daily  rosuvastatin, 40 mg, oral, Nightly  sennosides, 2 tablet, oral, Nightly  verapamil, 120 mg, oral, TID      Continuous medications     PRN medications  PRN medications: albuterol, benzonatate, dextrose, dextrose, glucagon, glucagon, HYDROmorphone, melatonin, naloxone       DATA:      Diagnostic tests reviewed for today's visit:     CBC:   Results from last 72 hours   Lab Units 03/12/25  0849   WBC AUTO x10*3/uL 12.4*   HEMATOCRIT % 31.3*   PLATELETS AUTO x10*3/uL 259   MCV fL 87     Coags:     CMP:   Results from last 72 hours   Lab Units 03/12/25  0849 03/11/25  0808   SODIUM mmol/L 135* 138   POTASSIUM mmol/L 4.6 3.9   CO2 mmol/L 25 26   BUN mg/dL 24* 21   MAGNESIUM mg/dL  --  2.01   ALBUMIN g/dL 3.4 3.5      Cardiac Enzymes: No lab exists for component: \"TROP\" .lab  Liver Function, Amylase, Lipase:       No lab exists for component: \"TPROT\", \"ALB\", \"TBILI\"   MG/PHOS: [id number](mg,p)@   Renal Panel:    Results from last 72 hours   Lab Units 03/12/25  0849   ALBUMIN g/dL 3.4   BUN mg/dL 24*   POTASSIUM mmol/L 4.6   CO2 mmol/L 25   SODIUM mmol/L 135*      Heme:        No lab exists for component: \"RETICP\", \"ABSRETIC\", \"LD\", \"AMARIS\", \"FE\", \"TRANSFERSAT\"   No " "components found for: \"UALBCR\"      Medication and Non-Pharmacologic VTE Prophylaxis/Anticoagulants    The patient has received anticoagulants recently:  Heparin is ordered or has been given within the last 24 hours OR  Lovenox has been given in the last 24 hours OR  An anticoagulant other than Heparin or Lovenox is on the home med list OR  An anticoagulant other than Heparin or Lovenox has been given within the last 5 days  Last Anticoag Admin            Orders not given:    enoxaparin (Lovenox) syringe 40 mg                   SIGNATURE: Dania Mondragon, [facility] Medicine    PAGER/CONTACT #: Epic Graciela      Disclaimer: Portions of this note may have been generated using Dragon voice recognition software. Reasonable efforts were made to correct any dictation errors that resulted due to the programming of this software but some may still be present.     Portions of this note including HPI, ROS, impression/plan, and examination may have been copied forward from yesterday to March 12, 2025 as to provide important historical information essential in contributing to medical decision making. Documentation has been reviewed and edited as necessary to support clinical decision making for today's visit and to reflect my own independent evaluation of this patient.       The time of this note does not reflect the time I saw the patient but the time that this note was written   "

## 2025-03-12 NOTE — PROGRESS NOTES
SW updated patient's spouse on updated facilities. ([facility] and [facility]) He states he will need to discuss with patient and get back to  on FOC.     Update 1236:  Patient asks that additional referrals be sent to continuing care of [facility] [facility] and also [facility] in [place]. Referrals sent at this time. Patient and family still will not provide FOC.     JOE Dodson

## 2025-03-13 LAB
ALBUMIN SERPL BCP-MCNC: 2.4 G/DL (ref 3.4–5)
ANION GAP SERPL CALC-SCNC: 14 MMOL/L (ref 10–20)
BUN SERPL-MCNC: 19 MG/DL (ref 6–23)
CALCIUM SERPL-MCNC: 8.2 MG/DL (ref 8.6–10.6)
CHLORIDE SERPL-SCNC: 103 MMOL/L (ref 98–107)
CO2 SERPL-SCNC: 23 MMOL/L (ref 21–32)
CREAT SERPL-MCNC: 0.59 MG/DL (ref 0.5–1.05)
EGFRCR SERPLBLD CKD-EPI 2021: >90 ML/MIN/1.73M*2
ERYTHROCYTE [DISTWIDTH] IN BLOOD BY AUTOMATED COUNT: 13 % (ref 11.5–14.5)
GLUCOSE BLD MANUAL STRIP-MCNC: 133 MG/DL (ref 74–99)
GLUCOSE BLD MANUAL STRIP-MCNC: 142 MG/DL (ref 74–99)
GLUCOSE BLD MANUAL STRIP-MCNC: 143 MG/DL (ref 74–99)
GLUCOSE BLD MANUAL STRIP-MCNC: 179 MG/DL (ref 74–99)
GLUCOSE BLD MANUAL STRIP-MCNC: 271 MG/DL (ref 74–99)
GLUCOSE BLD MANUAL STRIP-MCNC: 338 MG/DL (ref 74–99)
GLUCOSE BLD MANUAL STRIP-MCNC: 357 MG/DL (ref 74–99)
GLUCOSE SERPL-MCNC: 99 MG/DL (ref 74–99)
HCT VFR BLD AUTO: 27.1 % (ref 36–46)
HGB BLD-MCNC: 8.2 G/DL (ref 12–16)
MCH RBC QN AUTO: 27.1 PG (ref 26–34)
MCHC RBC AUTO-ENTMCNC: 30.3 G/DL (ref 32–36)
MCV RBC AUTO: 89 FL (ref 80–100)
NRBC BLD-RTO: 0.4 /100 WBCS (ref 0–0)
PHOSPHATE SERPL-MCNC: 1.7 MG/DL (ref 2.5–4.9)
PLATELET # BLD AUTO: 274 X10*3/UL (ref 150–450)
POTASSIUM SERPL-SCNC: 4.5 MMOL/L (ref 3.5–5.3)
RBC # BLD AUTO: 3.03 X10*6/UL (ref 4–5.2)
SODIUM SERPL-SCNC: 135 MMOL/L (ref 136–145)
WBC # BLD AUTO: 10.1 X10*3/UL (ref 4.4–11.3)

## 2025-03-13 PROCEDURE — 82947 ASSAY GLUCOSE BLOOD QUANT: CPT

## 2025-03-13 PROCEDURE — 36415 COLL VENOUS BLD VENIPUNCTURE: CPT | Performed by: STUDENT IN AN ORGANIZED HEALTH CARE EDUCATION/TRAINING PROGRAM

## 2025-03-13 PROCEDURE — 2500000002 HC RX 250 W HCPCS SELF ADMINISTERED DRUGS (ALT 637 FOR MEDICARE OP, ALT 636 FOR OP/ED): Performed by: STUDENT IN AN ORGANIZED HEALTH CARE EDUCATION/TRAINING PROGRAM

## 2025-03-13 PROCEDURE — 2500000004 HC RX 250 GENERAL PHARMACY W/ HCPCS (ALT 636 FOR OP/ED): Performed by: STUDENT IN AN ORGANIZED HEALTH CARE EDUCATION/TRAINING PROGRAM

## 2025-03-13 PROCEDURE — 2500000001 HC RX 250 WO HCPCS SELF ADMINISTERED DRUGS (ALT 637 FOR MEDICARE OP): Performed by: STUDENT IN AN ORGANIZED HEALTH CARE EDUCATION/TRAINING PROGRAM

## 2025-03-13 PROCEDURE — 85027 COMPLETE CBC AUTOMATED: CPT | Performed by: STUDENT IN AN ORGANIZED HEALTH CARE EDUCATION/TRAINING PROGRAM

## 2025-03-13 PROCEDURE — 99232 SBSQ HOSP IP/OBS MODERATE 35: CPT | Performed by: STUDENT IN AN ORGANIZED HEALTH CARE EDUCATION/TRAINING PROGRAM

## 2025-03-13 PROCEDURE — 94640 AIRWAY INHALATION TREATMENT: CPT

## 2025-03-13 PROCEDURE — 1100000001 HC PRIVATE ROOM DAILY

## 2025-03-13 PROCEDURE — 97110 THERAPEUTIC EXERCISES: CPT | Mod: GP

## 2025-03-13 PROCEDURE — 97530 THERAPEUTIC ACTIVITIES: CPT | Mod: GP

## 2025-03-13 PROCEDURE — 80069 RENAL FUNCTION PANEL: CPT | Performed by: STUDENT IN AN ORGANIZED HEALTH CARE EDUCATION/TRAINING PROGRAM

## 2025-03-13 RX ORDER — METHOCARBAMOL 1000 MG/1
1000 TABLET, FILM COATED ORAL EVERY 6 HOURS PRN
Start: 2025-03-13 | End: 2025-04-12

## 2025-03-13 RX ORDER — INSULIN LISPRO 100 [IU]/ML
20 INJECTION, SOLUTION INTRAVENOUS; SUBCUTANEOUS
Status: DISCONTINUED | OUTPATIENT
Start: 2025-03-13 | End: 2025-03-14 | Stop reason: HOSPADM

## 2025-03-13 RX ORDER — INSULIN GLARGINE 100 [IU]/ML
55 INJECTION, SOLUTION SUBCUTANEOUS NIGHTLY
Status: DISCONTINUED | OUTPATIENT
Start: 2025-03-13 | End: 2025-03-14 | Stop reason: HOSPADM

## 2025-03-13 RX ADMIN — INSULIN LISPRO 20 UNITS: 100 INJECTION, SOLUTION INTRAVENOUS; SUBCUTANEOUS at 13:02

## 2025-03-13 RX ADMIN — ASPIRIN 81 MG: 81 TABLET, COATED ORAL at 09:51

## 2025-03-13 RX ADMIN — METOPROLOL TARTRATE 50 MG: 50 TABLET, FILM COATED ORAL at 21:02

## 2025-03-13 RX ADMIN — GUAIFENESIN 600 MG: 600 TABLET ORAL at 20:53

## 2025-03-13 RX ADMIN — METHOCARBAMOL 1000 MG: 500 TABLET ORAL at 09:50

## 2025-03-13 RX ADMIN — METHOCARBAMOL 1000 MG: 500 TABLET ORAL at 15:37

## 2025-03-13 RX ADMIN — LATANOPROST 1 DROP: 50 SOLUTION OPHTHALMIC at 21:02

## 2025-03-13 RX ADMIN — SENNOSIDES 17.2 MG: 8.6 TABLET, FILM COATED ORAL at 20:53

## 2025-03-13 RX ADMIN — ACETAMINOPHEN 975 MG: 325 TABLET ORAL at 15:37

## 2025-03-13 RX ADMIN — VERAPAMIL HYDROCHLORIDE 120 MG: 120 TABLET, FILM COATED ORAL at 09:50

## 2025-03-13 RX ADMIN — ACETAMINOPHEN 975 MG: 325 TABLET ORAL at 20:53

## 2025-03-13 RX ADMIN — GABAPENTIN 300 MG: 300 CAPSULE ORAL at 09:49

## 2025-03-13 RX ADMIN — ACETAMINOPHEN 975 MG: 325 TABLET ORAL at 01:29

## 2025-03-13 RX ADMIN — GUAIFENESIN 600 MG: 600 TABLET ORAL at 09:50

## 2025-03-13 RX ADMIN — VERAPAMIL HYDROCHLORIDE 120 MG: 120 TABLET, FILM COATED ORAL at 15:37

## 2025-03-13 RX ADMIN — FLUTICASONE FUROATE AND VILANTEROL TRIFENATATE 1 PUFF: 200; 25 POWDER RESPIRATORY (INHALATION) at 08:54

## 2025-03-13 RX ADMIN — INSULIN GLARGINE 55 UNITS: 100 INJECTION, SOLUTION SUBCUTANEOUS at 20:53

## 2025-03-13 RX ADMIN — ACETAMINOPHEN 975 MG: 325 TABLET ORAL at 09:51

## 2025-03-13 RX ADMIN — ROSUVASTATIN CALCIUM 40 MG: 40 TABLET, FILM COATED ORAL at 20:53

## 2025-03-13 RX ADMIN — SODIUM CHLORIDE, POTASSIUM CHLORIDE, SODIUM LACTATE AND CALCIUM CHLORIDE 100 ML/HR: 600; 310; 30; 20 INJECTION, SOLUTION INTRAVENOUS at 10:15

## 2025-03-13 RX ADMIN — GABAPENTIN 300 MG: 300 CAPSULE ORAL at 20:53

## 2025-03-13 RX ADMIN — INSULIN LISPRO 20 UNITS: 100 INJECTION, SOLUTION INTRAVENOUS; SUBCUTANEOUS at 20:09

## 2025-03-13 RX ADMIN — HYDROMORPHONE HYDROCHLORIDE 4 MG: 2 TABLET ORAL at 10:15

## 2025-03-13 RX ADMIN — LATANOPROST 1 DROP: 50 SOLUTION OPHTHALMIC at 09:54

## 2025-03-13 RX ADMIN — VERAPAMIL HYDROCHLORIDE 120 MG: 120 TABLET, FILM COATED ORAL at 20:53

## 2025-03-13 RX ADMIN — METHOCARBAMOL 1000 MG: 500 TABLET ORAL at 01:28

## 2025-03-13 RX ADMIN — MONTELUKAST 10 MG: 10 TABLET, FILM COATED ORAL at 20:53

## 2025-03-13 RX ADMIN — METOPROLOL TARTRATE 50 MG: 50 TABLET, FILM COATED ORAL at 09:49

## 2025-03-13 RX ADMIN — INSULIN LISPRO 9 UNITS: 100 INJECTION, SOLUTION INTRAVENOUS; SUBCUTANEOUS at 13:04

## 2025-03-13 RX ADMIN — POLYETHYLENE GLYCOL 3350 17 G: 17 POWDER, FOR SOLUTION ORAL at 09:51

## 2025-03-13 RX ADMIN — ENOXAPARIN SODIUM 40 MG: 100 INJECTION SUBCUTANEOUS at 09:51

## 2025-03-13 ASSESSMENT — COGNITIVE AND FUNCTIONAL STATUS - GENERAL
MOVING TO AND FROM BED TO CHAIR: A LOT
WALKING IN HOSPITAL ROOM: TOTAL
WALKING IN HOSPITAL ROOM: A LOT
MOVING TO AND FROM BED TO CHAIR: TOTAL
MOBILITY SCORE: 8
DRESSING REGULAR UPPER BODY CLOTHING: A LOT
TURNING FROM BACK TO SIDE WHILE IN FLAT BAD: A LOT
MOBILITY SCORE: 11
WALKING IN HOSPITAL ROOM: A LOT
PERSONAL GROOMING: A LITTLE
STANDING UP FROM CHAIR USING ARMS: A LOT
DAILY ACTIVITIY SCORE: 15
DAILY ACTIVITIY SCORE: 12
EATING MEALS: A LOT
CLIMB 3 TO 5 STEPS WITH RAILING: TOTAL
STANDING UP FROM CHAIR USING ARMS: TOTAL
MOBILITY SCORE: 11
PERSONAL GROOMING: A LOT
TOILETING: A LOT
CLIMB 3 TO 5 STEPS WITH RAILING: TOTAL
STANDING UP FROM CHAIR USING ARMS: A LOT
MOVING FROM LYING ON BACK TO SITTING ON SIDE OF FLAT BED WITH BEDRAILS: A LOT
TOILETING: A LOT
TURNING FROM BACK TO SIDE WHILE IN FLAT BAD: A LOT
DRESSING REGULAR LOWER BODY CLOTHING: A LOT
HELP NEEDED FOR BATHING: A LOT
DRESSING REGULAR LOWER BODY CLOTHING: A LOT
CLIMB 3 TO 5 STEPS WITH RAILING: TOTAL
MOVING FROM LYING ON BACK TO SITTING ON SIDE OF FLAT BED WITH BEDRAILS: A LOT
MOVING FROM LYING ON BACK TO SITTING ON SIDE OF FLAT BED WITH BEDRAILS: A LOT
DRESSING REGULAR UPPER BODY CLOTHING: A LOT
HELP NEEDED FOR BATHING: A LOT
MOVING TO AND FROM BED TO CHAIR: A LOT
TURNING FROM BACK TO SIDE WHILE IN FLAT BAD: A LOT

## 2025-03-13 ASSESSMENT — PAIN SCALES - GENERAL
PAINLEVEL_OUTOF10: 3
PAINLEVEL_OUTOF10: 3
PAINLEVEL_OUTOF10: 4
PAINLEVEL_OUTOF10: 8
PAINLEVEL_OUTOF10: 9

## 2025-03-13 ASSESSMENT — PAIN DESCRIPTION - ORIENTATION
ORIENTATION: MID
ORIENTATION: MID

## 2025-03-13 ASSESSMENT — PAIN DESCRIPTION - LOCATION
LOCATION: BACK

## 2025-03-13 ASSESSMENT — PAIN SCALES - WONG BAKER
WONGBAKER_NUMERICALRESPONSE: NO HURT

## 2025-03-13 ASSESSMENT — PAIN - FUNCTIONAL ASSESSMENT: PAIN_FUNCTIONAL_ASSESSMENT: 0-10

## 2025-03-13 NOTE — PROGRESS NOTES
Social Work Note:    CHARLENE spoke with the patient's MD who reported that the patient could be medically ready as soon as this afternoon.  LISW called patient's  to discuss FOC for SNF.  LISW had to leave a message.  LISW will try again at a later time and will call patient's room as well.    ADD 3/13/25 10:16am  LISW called patient's roomw ith no answer.  LISW called patient's  again.  LISW will continue to reach out to patient and  for a FOC.     Ella Negro, MSW, LISW-S

## 2025-03-13 NOTE — CARE PLAN
Problem: Pain - Adult  Goal: Verbalizes/displays adequate comfort level or baseline comfort level  Outcome: Progressing     Problem: Safety - Adult  Goal: Free from fall injury  Outcome: Progressing     Problem: Discharge Planning  Goal: Discharge to home or other facility with appropriate resources  Outcome: Progressing     Problem: Chronic Conditions and Co-morbidities  Goal: Patient's chronic conditions and co-morbidity symptoms are monitored and maintained or improved  Outcome: Progressing     Problem: Nutrition  Goal: Nutrient intake appropriate for maintaining nutritional needs  Outcome: Progressing     Problem: Skin  Goal: Decreased wound size/increased tissue granulation at next dressing change  Outcome: Progressing  Goal: Participates in plan/prevention/treatment measures  Outcome: Progressing  Goal: Prevent/manage excess moisture  Outcome: Progressing  Goal: Prevent/minimize sheer/friction injuries  Outcome: Progressing  Goal: Promote/optimize nutrition  Outcome: Progressing  Goal: Promote skin healing  Outcome: Progressing     Problem: Diabetes  Goal: Achieve decreasing blood glucose levels by end of shift  Outcome: Progressing  Goal: Increase stability of blood glucose readings by end of shift  Outcome: Progressing  Goal: Decrease in ketones present in urine by end of shift  Outcome: Progressing  Goal: Maintain electrolyte levels within acceptable range throughout shift  Outcome: Progressing  Goal: Maintain glucose levels >70mg/dl to <250mg/dl throughout shift  Outcome: Progressing  Goal: No changes in neurological exam by end of shift  Outcome: Progressing  Goal: Learn about and adhere to nutrition recommendations by end of shift  Outcome: Progressing  Goal: Vital signs within normal range for age by end of shift  Outcome: Progressing  Goal: Increase self care and/or family involovement by end of shift  Outcome: Progressing  Goal: Receive DSME education by end of shift  Outcome: Progressing      Problem: Pain  Goal: Takes deep breaths with improved pain control throughout the shift  Outcome: Progressing  Goal: Turns in bed with improved pain control throughout the shift  Outcome: Progressing  Goal: Walks with improved pain control throughout the shift  Outcome: Progressing  Goal: Performs ADL's with improved pain control throughout shift  Outcome: Progressing  Goal: Participates in PT with improved pain control throughout the shift  Outcome: Progressing  Goal: Free from opioid side effects throughout the shift  Outcome: Progressing  Goal: Free from acute confusion related to pain meds throughout the shift  Outcome: Progressing   The patient's goals for the shift include      The clinical goals for the shift include Pt will be free of falls and maintain safety throughout the shift

## 2025-03-13 NOTE — PROGRESS NOTES
"Physical Therapy    Physical Therapy Treatment    Patient Name: Abbie Mayo  MRN: [id number]  Department: [place] [id number]  Room: [address] [place] [address]  Today's Date: 3/13/2025  Time Calculation  Start Time: 1502  Stop Time: 1556  Time Calculation (min): 54 min         Assessment/Plan   PT Assessment  PT Assessment Results: Decreased strength, Impaired balance, Decreased mobility, Pain  Barriers to Discharge Home: Caregiver assistance, Physical needs  Caregiver Assistance: Caregiver assistance needed per identified barriers - however, level of patient's required assistance exceeds assistance available at home  Physical Needs: Stair navigation into home limited by function/safety, 24hr mobility assistance needed, 24hr ADL assistance needed, High falls risk due to function or environment  End of Session Communication: Bedside nurse  Assessment Comment: Pt able to complete bed mobility tasks with 1 person assist this date, rosas improving strength and activity tolerance though still with heavy therapy needs to return to safe functional mobility.  End of Session Patient Position: Bed, 3 rail up, Alarm on     PT Plan  Treatment/Interventions: Bed mobility, Transfer training, Gait training, Balance training, Therapeutic exercise, Therapeutic activity  PT Plan: Ongoing PT  PT Frequency: Daily  PT Discharge Recommendations: Moderate intensity level of continued care  PT Recommended Transfer Status: Assist x2  PT - OK to Discharge: Yes (POC/goals/discharge rec intensity created)      General Visit Information:   PT  Visit  PT Received On: 03/13/25  General  Prior to Session Communication: Bedside nurse  General Comment: Pt agreeable to participate though reports she feels like she is \"floating\" due to medications take during MARIAN drain removal earlier.  Needing cues to keep eyes open throughout the session.  Able to progress to needing one person assist to complete supine<->sit transfers and to maintain upright sitting balance.  Rosas " posterior and (R) lean as fatigues in sitting at EOB.  Sat to EOB ~35 min during session.  Will continue to follow.    Subjective   Precautions:  Precautions  Medical Precautions: Fall precautions  Post-Surgical Precautions: Spinal precautions  Precautions Comment: driss gonzalez    Objective   Pain:  Pain Assessment  Pain Assessment: 0-10  0-10 (Numeric) Pain Score: 3  Pain Location: Back  Pain Orientation: Lower  Pain Interventions: Repositioned, Distraction (received tyelonol during session by nursing)  Cognition:  Cognition  Arousal/Alertness: Appropriate responses to stimuli (though eyes closed a majority of time)  Following Commands: Follows one step commands with repetition     Postural Control:  Postural Control  Postural Control: Impaired  Posture Comment: heavy use of (L) UE on bed rail to support self in upright sittin, unable to maintain upright posture without UE support  Static Sitting Balance  Static Sitting-Balance Support: Feet supported, Left upper extremity supported (less use of (R) UE for support)  Static Sitting-Level of Assistance:  (CGA-> mod Ax1 as fatigues)  Dynamic Sitting Balance  Dynamic Sitting-Balance Support: Feet supported  Dynamic Sitting-Level of Assistance: Moderate assistance, Maximum assistance  Dynamic Sitting-Comments: during dynamic (B) UE tasks, heavy posterior lean.  Extremity/Trunk Assessments:  Strength RLE  R Hip Flexion: 2+/5  R Hip ABduction: 2+/5  R Hip ADduction: 2+/5  R Knee Extension: 2/5  R Ankle Dorsiflexion: 3/5  R Ankle Plantar Flexion: 3/5  Strength LLE  L Hip Flexion: 2/5  L Hip ABduction: 2+/5  L Hip ADduction: 2+/5  L Knee Extension: 3/5  L Ankle Dorsiflexion: 3/5  L Ankle Plantar Flexion: 3/5  Activity Tolerance:  Activity Tolerance  Endurance: Tolerates 30 min exercise with multiple rests  Treatments:  Therapeutic Exercise  Therapeutic Exercise Performed: Yes  Therapeutic Exercise Activity 1: (B) LE: APs x 10 reps (increased verbal and tactile cues  for performance), HS AAROM x 10 reps (requires less assist to complete on (R) vs (L), LAQ x 10 reps ( improved quality of motion on (L) LE vs (R), pt feels she needs to compensate by leaning back to complete on (R), bed height elevated to eliminate friction of socks on floor).    Therapeutic Activity  Therapeutic Activity Performed: Yes  Therapeutic Activity 1: Sat to EOB total of ~35 min session. During static moments, focused on upright midline posture and use of (B) UEs to support self.  Pt tends to lean to (R) and posterior, with reports she cannot tell when she is leaning excessively (though also has eyes closed so no visual cues).  Pt took medications while seated at EOB using both hands for tasks, unable to maintain midline upright posture during this time, needing max Ax1 to maintain balance.    Bed Mobility  Bed Mobility: Yes  Bed Mobility 1  Bed Mobility 1: Rolling left  Level of Assistance 1: Maximum assistance, Moderate verbal cues (x1)  Bed Mobility Comments 1: assist with draw sheet to roll to (L), guided (R) hand to upper bed rail, pt able to turn with trunk better this session as well as push hips better into sidelying  Bed Mobility 2  Bed Mobility  2: Side lying left to sit  Level of Assistance 2: Maximum assistance, Moderate verbal cues (x1)  Bed Mobility Comments 2: HOB raised, assist with draw sheet to bring (B) LEs fully off bed in controlled manner, heavy assist at trunk  Bed Mobility 3  Bed Mobility 3: Sitting to supine  Level of Assistance 3: Maximum assistance (x1)  Bed Mobility Comments 3: most assist at (B) LEs, assist to control trunk down into (L) sidelying  Bed Mobility 4  Bed Mobility 4: Rolling right  Level of Assistance 4: Maximum assistance, Moderate verbal cues (x1)  Bed Mobility Comments 4: use of draw sheet, guiding (L) hand to upper bed rail to facilitate    Transfers  Transfer:  (demos decreased strength, sensation (B) LEs, impaired trunk support/impaired balance, unsafe to  attempt standing tasks at this time)    Outcome Measures:  [facility] Basic Mobility  Turning from your back to your side while in a flat bed without using bedrails: A lot  Moving from lying on your back to sitting on the side of a flat bed without using bedrails: A lot  Moving to and from bed to chair (including a wheelchair): Total  Standing up from a chair using your arms (e.g. wheelchair or bedside chair): Total  To walk in hospital room: Total  Climbing 3-5 steps with railing: Total  Basic Mobility - Total Score: 8    Education Documentation  Mobility Training, taught by Nelsy Pate, PT at 3/13/2025  5:01 PM.  Learner: Patient  Readiness: Acceptance  Method: Explanation  Response: Verbalizes Understanding  Comment: increasing upright sitting in bed, therex to complete on her own as feasible       Encounter Problems       Encounter Problems (Active)       Balance       STG - Maintains static sitting balance with upper extremity support with min Ax1.  (Progressing)       Start:  03/10/25    Expected End:  03/24/25            STG - Maintains dynamic sitting balance with upper extremity support with mod Ax1.  (Progressing)       Start:  03/10/25    Expected End:  03/24/25               PT Transfers       STG - Transfer from bed to chair mod Ax1.  (Not Progressing)       Start:  03/10/25    Expected End:  03/24/25            STG - Patient will perform bed mobility min Ax1.  (Progressing)       Start:  03/10/25    Expected End:  03/24/25            STG - Patient will transfer sit to and from stand mod Ax1.  (Not Progressing)       Start:  03/10/25    Expected End:  03/24/25 03/13/25 at 5:02 PM - Nelsy Pate, PT

## 2025-03-13 NOTE — PROGRESS NOTES
[facility]   HOSPITAL MEDICINE     PROGRESS NOTE       PATIENT NAME: Abbie Mayo   MRN: [id number]   SERVICE DATE: 03/13/25   SERVICE TIME: 4:26 PM      Hospital Medicine/Primary Attending: Dania Mondragon DO   LENGTH OF STAY: 6     CHIEF COMPLAINT: Cauda equina compression (Multi)     Assessment & Plan  Cauda equina compression (Multi)    Thoracic myelopathy    Ossification of ligamentum flavum    Thoracic spinal stenosis        Assessment/Plan      I reviewed:    All new and relevant labs and imaging   New EKGs  Consultant notes   Vitals Signs   Nursing notes       Ms. Mayo is a 70y/o lady with history of T2DM (A1C 10.6%, 9/2024), HTN, asthma, and chronic thoracic spinal stenosis c/b LE weakness and urinary incontinence who presented due to acute worsening of LE weakness. S/p T9-11 laminectomy with PSIF with Dr. Murguia (3/8).  On 3/11/2025 patient had a worse neurological exam.  Underwent stat MRI, which thankfully showed improved appearance of nerve compression.  Patient started on 24 hours of steroids, with rapid improvement in symptoms.  Drains have been removed 3/15/2025 in the PM.  Discharge pending transport       # Thoracic spinal stenosis  # LE weakness  # Urinary incontinence  Patient with 6mo history of slowly worsening back pain and LE weakness. Patient started developing urinary incontinence and was evaluated by neurosurgery on 8/2024. She was planned for surgery several times, but was unable to procedure due to an asthma exacerbation and then uncontrolled diabetes.   - [facility] with severe thoracic stenosis with cord compression and cord signal change at T9-10 and T10-11   - Ortho spine consulted, s/p T9-11 laminectomy with PSIF with Dr. Murguia (3/8)  - Will restart DVT ppx 3/11 PM  - Pain regimen: scheduled tylenol and robaxin 1000mg q8hr; oxy 10mg q4hr prn and IV dilaudid 0.4mg for breakthrough  - PT/OT consulted, recommended moderate  Treatment Number: 1 Post-Care Instructions: I reviewed with the patient in detail post-care instructions. Patient should avoid sunlight and wear sun protection. intensity> SNF  -s/p dex 10q8h for 24 hours 3/11/25 feeling better   -MRI discussed with spine team looks good. No more intervention as of now     # T2DM with hyperglycemia worse 2/2 steroids expect improvement now off steroids  A1C 10.6 (9/2024).   Home regimen is lantus 48U at bedtime + lispro 20U TIDAC with metformin. Sugars improved control today c/b steroid use (necessary given main issue above)  - resume lantus 50U at bedtime + lispro 20U TIDAC  -SSI   -Hypoglycemia protocol    -Blood glucose checks qAC & qHS   -Carbohydrate controlled diet     # HTN - continue home verapamil given sinus tachycardia, hold home losartan    # Asthma - continue home Breo + montelukast     CORE MEASURES  N: diabetic diet  P: subcutaneous lovenox  C: Full code, confirmed  HCP: Jorge, spouse [phone]   Dispo: SNF pending transport         I spent 35 minutes in professional medical decision making, face-to-face examination and counseling, care coordination, chart review, discussions with consultants, and care planning        DISPOSITION:  Functional Status Prior to Admit:     Medical Necessity for Continued Hospitalization yes      Plan of care discussed with:         Subjective   SUBJECTIVE:  Pt seen and examined.   Yes     Patient denies CP, SOB, fevers, chills, nausea, or emesis.         Objective      PHYSICAL EXAM: Patient Vitals for the past 24 hrs:   BP Temp Pulse Resp SpO2   03/13/25 1557 128/66 36.4 °C (97.5 °F) 78 -- 95 %   03/13/25 1155 127/59 36.1 °C (97 °F) 76 16 98 %   03/13/25 0743 152/84 36.2 °C (97.2 °F) 73 17 99 %   03/13/25 0433 142/69 35.9 °C (96.6 °F) 75 -- 94 %   03/13/25 0023 146/73 35.9 °C (96.6 °F) 74 17 99 %   03/12/25 1951 153/84 36.1 °C (97 °F) 94 -- 93 %      Physical Exam  Vitals and nursing note reviewed.   Constitutional:       General: She is not in acute distress.     Appearance: Normal appearance. She is not ill-appearing.   HENT:      Head: Normocephalic and atraumatic.   Pulmonary:       "Effort: No respiratory distress.   Abdominal:      Tenderness: There is no guarding.   Skin:     Coloration: Skin is not jaundiced or pale.   Neurological:      Mental Status: She is alert and oriented to person, place, and time.   Psychiatric:         Mood and Affect: Mood normal.            MEDICATIONS:   Scheduled medications  acetaminophen, 975 mg, oral, q6h  aspirin, 81 mg, oral, Daily  enoxaparin, 40 mg, subcutaneous, q24h DG  fluticasone furoate-vilanteroL, 1 puff, inhalation, Daily  gabapentin, 300 mg, oral, BID  guaiFENesin, 600 mg, oral, BID  insulin glargine, 55 Units, subcutaneous, Nightly  insulin lispro, 0-15 Units, subcutaneous, TID AC  insulin lispro, 20 Units, subcutaneous, TID AC  latanoprost, 1 drop, Right Eye, BID  [Held by provider] losartan, 100 mg, oral, Daily  methocarbamol, 1,000 mg, oral, q8h DG  metoprolol tartrate, 50 mg, oral, BID  montelukast, 10 mg, oral, Nightly  polyethylene glycol, 17 g, oral, Daily  rosuvastatin, 40 mg, oral, Nightly  sennosides, 2 tablet, oral, Nightly  verapamil, 120 mg, oral, TID      Continuous medications  lactated Ringer's, 100 mL/hr, Last Rate: 100 mL/hr (03/13/25 1015)      PRN medications  PRN medications: albuterol, benzonatate, dextrose, dextrose, glucagon, glucagon, HYDROmorphone, melatonin, naloxone       DATA:      Diagnostic tests reviewed for today's visit:     CBC:   Results from last 72 hours   Lab Units 03/13/25  0802   WBC AUTO x10*3/uL 10.1   HEMATOCRIT % 27.1*   PLATELETS AUTO x10*3/uL 274   MCV fL 89     Coags:     CMP:   Results from last 72 hours   Lab Units 03/13/25  0802 03/12/25  0849 03/11/25  0808   SODIUM mmol/L 135*   < > 138   POTASSIUM mmol/L 4.5   < > 3.9   CO2 mmol/L 23   < > 26   BUN mg/dL 19   < > 21   MAGNESIUM mg/dL  --   --  2.01   ALBUMIN g/dL 2.4*   < > 3.5    < > = values in this interval not displayed.      Cardiac Enzymes: No lab exists for component: \"TROP\" .lab  Liver Function, Amylase, Lipase:       No lab exists " Pulse Duration In Ms: 10 "for component: \"TPROT\", \"ALB\", \"TBILI\"   MG/PHOS: [id number](mg,p)@   Renal Panel:    Results from last 72 hours   Lab Units 03/13/25  0802   ALBUMIN g/dL 2.4*   BUN mg/dL 19   POTASSIUM mmol/L 4.5   CO2 mmol/L 23   SODIUM mmol/L 135*      Heme:        No lab exists for component: \"RETICP\", \"ABSRETIC\", \"LD\", \"AMARIS\", \"FE\", \"TRANSFERSAT\"   No components found for: \"UALBCR\"      Medication and Non-Pharmacologic VTE Prophylaxis/Anticoagulants    The patient has received anticoagulants recently:  Heparin is ordered or has been given within the last 24 hours OR  Lovenox has been given in the last 24 hours OR  An anticoagulant other than Heparin or Lovenox is on the home med list OR  An anticoagulant other than Heparin or Lovenox has been given within the last 5 days  Last Anticoag Admin            Orders not given:    enoxaparin (Lovenox) syringe 40 mg                   SIGNATURE: Dania Mondragon, [facility]    PAGER/CONTACT #: Epic Chat      Disclaimer: Portions of this note may have been generated using Dragon voice recognition software. Reasonable efforts were made to correct any dictation errors that resulted due to the programming of this software but some may still be present.     Portions of this note including HPI, ROS, impression/plan, and examination may have been copied forward from yesterday to March 13, 2025 as to provide important historical information essential in contributing to medical decision making. Documentation has been reviewed and edited as necessary to support clinical decision making for today's visit and to reflect my own independent evaluation of this patient.       The time of this note does not reflect the time I saw the patient but the time that this note was written   " Exposure Time (In Sec): 90 Fluence (J/Cm2): 5 Spot Size In Mm: 3 St Module Post-Care: Patient's skin was cleansed with mild gel wash.  Eight passes were performed entire face and neck, avoiding trachea, left side first, then right side with a thick layer of ultrasound gel.  Gel was removed, Calm & Correct serum and Lumiere eye cream applied with Journee SPF 30 post treatment. Frequency In Hz: Unibody Pulse Width: 1 sec Matrix Dots: 7 x 7  Post-Care: Bikini - 18j/cm2-30mns  #1 of \\n$120 PAN (regular price $150) Er:Yag Post-Care Tattoo: Post care reviewed with patient. Frequency In Hz: 2 Energy In Kj: 50 Price (Use Numbers Only, No Special Characters Or $): 75.00 Consent: Written consent obtained, risks reviewed including but not limited to crusting, scabbing, blistering, scarring, darker or lighter pigmentary change, systemic reactions, ulceration, incidental hair removal, bruising, and/or incomplete removal. Pulse Mode (Optional): L Stacks: 0 Post-Care To Use?: Generic Pulse Type: I Power (Myles): Low (10w) Fluence (Mj/Cm2): 7 Total Energy (Kj): 0.1 Handpiece: Bipolar Nd:Yag Post-Care: Immediate endpoint whitening and pinpoint bleeding. Vaseline and ice applied. Post care reviewed with patient. Pulse Width: 30 ms Ipl Post-Care: Patient tolerated treatment well.  Calm & Correct Serum and SPF 46 applied post-tx Detail Level: Zone Shr Post-Care: Patient tolerated treatment well.  Calm & Correct Serum and SPF 46 was applied post-tx. Treatment Fluence In Mj: 15 Energy (Mj/P): 600 Clear Lift Post-Care: Post care reviewed with patient. Patient should avoid direct sunlight and wear broad spectrum sunscreen daily. External Cooling: Braxton Cryo 5 Repetition Rate (Hz): 0.5 Energy In Mj: 400

## 2025-03-13 NOTE — CARE PLAN
The patient's goals for the shift include  to get some rest    The clinical goals for the shift include Pt will remain HDS during this shift

## 2025-03-13 NOTE — PROGRESS NOTES
"Orthopaedic Surgery Progress Note    Subjective: NAEON. Pain is improving. Reports that she feels her strength is improving as well. Denes CP/SOB, extremity numbness.         Objective:  /84   Pulse 73   Temp 36.2 °C (97.2 °F)   Resp 17   Ht 1.676 m (5' 5.98\")   Wt 99.7 kg (219 lb 12.8 oz)   SpO2 99%   BMI 35.49 kg/m²     Gen: arousable, NAD, appropriately conversational  Cardiac: RRR to peripheral palpation  Resp: nonlabored on RA  GI: soft, nondistended    MSK:  MARIAN x1 in place    L1: SILT       L2: SILT      Hip flexors 3/5 Left; 3/5 Right  L3: SILT      Knee extension 3/5 Left; 3/5 Right  L4: SILT      Tib Ant. (Dorsiflexion) 5/5 Left; 5/5 Right  L5: SILT      EHL5/5 Left; 5/5 Right  S1: SILT      Plantar flexion 5/5 Left; 5/5 Right      Assessment/Plan: 69 y.o. female s/p T9-T11 PSIF and decompression, L2-3 laminectomy, dural repair on 3/8/25 with Dr. Murguia. Change in exam on 3/11, increased BLE weakness improved with decadron. Repeat MRI without evidence of new cord compression.    Plan:  - Decadron IV 10mg q8h for 3 doses (finished)  - Weight-bearing status: Ok to mobilize OOB ad samy  - Regular diet as tolerated, bowel regimen  - Multimodal pain  - OT/PT  - Reyna-operative Ancef for 48 hours  - MARIAN drain to be removed today by ortho  - Kelley: None   - DVT ppx: SCDs, early mobility. May resume DVT ppx 72h postop (3/11 PM)    Dispo: Pending drain    Plan was discussed with attending Dr. Murguia    While admitted, this patient will be followed by the Ortho Spine Team, available via Epic Chat weekdays 6a-6p. Please page [phone] on nights and weekends.    Ortho Spine  First Call: John Colunga, PGY-2  Second Call: Alix Diaz, PGY-4  Third Call: Marquise Pastor, Fellow    Kuldeep Umanzor MD  Orthopedic Surgery, PGY-2  Available via Epic Chat    "

## 2025-03-14 VITALS
BODY MASS INDEX: 35.32 KG/M2 | RESPIRATION RATE: 18 BRPM | HEIGHT: 66 IN | OXYGEN SATURATION: 97 % | TEMPERATURE: 98.2 F | DIASTOLIC BLOOD PRESSURE: 66 MMHG | SYSTOLIC BLOOD PRESSURE: 118 MMHG | WEIGHT: 219.8 LBS | HEART RATE: 76 BPM

## 2025-03-14 PROBLEM — G83.4 CAUDA EQUINA COMPRESSION (MULTI): Status: RESOLVED | Noted: 2025-03-06 | Resolved: 2025-03-14

## 2025-03-14 PROBLEM — M48.04 THORACIC SPINAL STENOSIS: Status: RESOLVED | Noted: 2025-03-06 | Resolved: 2025-03-14

## 2025-03-14 PROBLEM — M47.14 THORACIC MYELOPATHY: Status: RESOLVED | Noted: 2024-09-09 | Resolved: 2025-03-14

## 2025-03-14 PROBLEM — M24.28: Status: RESOLVED | Noted: 2025-03-06 | Resolved: 2025-03-14

## 2025-03-14 LAB
GLUCOSE BLD MANUAL STRIP-MCNC: 159 MG/DL (ref 74–99)
GLUCOSE BLD MANUAL STRIP-MCNC: 168 MG/DL (ref 74–99)
GLUCOSE BLD MANUAL STRIP-MCNC: 188 MG/DL (ref 74–99)
GLUCOSE BLD MANUAL STRIP-MCNC: 94 MG/DL (ref 74–99)

## 2025-03-14 PROCEDURE — 2500000004 HC RX 250 GENERAL PHARMACY W/ HCPCS (ALT 636 FOR OP/ED): Performed by: STUDENT IN AN ORGANIZED HEALTH CARE EDUCATION/TRAINING PROGRAM

## 2025-03-14 PROCEDURE — 2500000002 HC RX 250 W HCPCS SELF ADMINISTERED DRUGS (ALT 637 FOR MEDICARE OP, ALT 636 FOR OP/ED): Performed by: STUDENT IN AN ORGANIZED HEALTH CARE EDUCATION/TRAINING PROGRAM

## 2025-03-14 PROCEDURE — 82947 ASSAY GLUCOSE BLOOD QUANT: CPT

## 2025-03-14 PROCEDURE — 2500000001 HC RX 250 WO HCPCS SELF ADMINISTERED DRUGS (ALT 637 FOR MEDICARE OP): Performed by: STUDENT IN AN ORGANIZED HEALTH CARE EDUCATION/TRAINING PROGRAM

## 2025-03-14 PROCEDURE — 99239 HOSP IP/OBS DSCHRG MGMT >30: CPT | Performed by: STUDENT IN AN ORGANIZED HEALTH CARE EDUCATION/TRAINING PROGRAM

## 2025-03-14 PROCEDURE — 94640 AIRWAY INHALATION TREATMENT: CPT

## 2025-03-14 RX ORDER — HYDROMORPHONE HYDROCHLORIDE 4 MG/1
4 TABLET ORAL EVERY 4 HOURS PRN
Start: 2025-03-14 | End: 2025-03-21

## 2025-03-14 RX ORDER — INSULIN GLARGINE 100 [IU]/ML
55 INJECTION, SOLUTION SUBCUTANEOUS NIGHTLY
Qty: 20 ML | Refills: 1 | Status: SHIPPED | OUTPATIENT
Start: 2025-03-14 | End: 2025-05-13

## 2025-03-14 RX ADMIN — POLYETHYLENE GLYCOL 3350 17 G: 17 POWDER, FOR SOLUTION ORAL at 08:43

## 2025-03-14 RX ADMIN — ENOXAPARIN SODIUM 40 MG: 100 INJECTION SUBCUTANEOUS at 08:42

## 2025-03-14 RX ADMIN — METHOCARBAMOL 1000 MG: 500 TABLET ORAL at 08:42

## 2025-03-14 RX ADMIN — GABAPENTIN 300 MG: 300 CAPSULE ORAL at 08:42

## 2025-03-14 RX ADMIN — LATANOPROST 1 DROP: 50 SOLUTION OPHTHALMIC at 08:44

## 2025-03-14 RX ADMIN — INSULIN LISPRO 20 UNITS: 100 INJECTION, SOLUTION INTRAVENOUS; SUBCUTANEOUS at 08:37

## 2025-03-14 RX ADMIN — METHOCARBAMOL 1000 MG: 500 TABLET ORAL at 00:12

## 2025-03-14 RX ADMIN — VERAPAMIL HYDROCHLORIDE 120 MG: 120 TABLET, FILM COATED ORAL at 08:42

## 2025-03-14 RX ADMIN — FLUTICASONE FUROATE AND VILANTEROL TRIFENATATE 1 PUFF: 200; 25 POWDER RESPIRATORY (INHALATION) at 08:18

## 2025-03-14 RX ADMIN — GUAIFENESIN 600 MG: 600 TABLET ORAL at 08:42

## 2025-03-14 RX ADMIN — ASPIRIN 81 MG: 81 TABLET, COATED ORAL at 08:42

## 2025-03-14 RX ADMIN — METOPROLOL TARTRATE 50 MG: 50 TABLET, FILM COATED ORAL at 08:42

## 2025-03-14 RX ADMIN — ACETAMINOPHEN 975 MG: 325 TABLET ORAL at 08:43

## 2025-03-14 ASSESSMENT — COGNITIVE AND FUNCTIONAL STATUS - GENERAL
MOVING TO AND FROM BED TO CHAIR: A LOT
PERSONAL GROOMING: A LOT
DRESSING REGULAR UPPER BODY CLOTHING: A LOT
STANDING UP FROM CHAIR USING ARMS: A LOT
TOILETING: A LOT
CLIMB 3 TO 5 STEPS WITH RAILING: TOTAL
MOBILITY SCORE: 10
MOVING FROM LYING ON BACK TO SITTING ON SIDE OF FLAT BED WITH BEDRAILS: A LOT
WALKING IN HOSPITAL ROOM: TOTAL
DRESSING REGULAR LOWER BODY CLOTHING: A LOT
HELP NEEDED FOR BATHING: A LOT
DAILY ACTIVITIY SCORE: 13
TURNING FROM BACK TO SIDE WHILE IN FLAT BAD: A LOT
EATING MEALS: A LITTLE

## 2025-03-14 ASSESSMENT — PAIN SCALES - WONG BAKER: WONGBAKER_NUMERICALRESPONSE: NO HURT

## 2025-03-14 ASSESSMENT — PAIN DESCRIPTION - LOCATION: LOCATION: BACK

## 2025-03-14 ASSESSMENT — PAIN SCALES - GENERAL: PAINLEVEL_OUTOF10: 4

## 2025-03-14 ASSESSMENT — PAIN DESCRIPTION - ORIENTATION: ORIENTATION: MID

## 2025-03-14 NOTE — PROGRESS NOTES
Social Work Note:    CHARLENE spoke with the patient's MD on this date who reported that the patient is still medically ready.  JACKSONW let patient know and issued IMM.  Patient is agreeable to discharge.  Patient is set up to go at 10:30am.  CHARLENE will continue to follow    NISHA Escobar, CHARLENE- S

## 2025-03-14 NOTE — NURSING NOTE
This RN made four different attempts to call the [facility] on [phone] but no response from the facility.   Pt left around 10.45am, picked up by [facility]. Pt left with all her belongings and a copy of the discharged instruction.   ALKA Jaime

## 2025-03-14 NOTE — DISCHARGE SUMMARY
Discharge Diagnosis  Thoracic spinal stenosis  LE weakness  Urinary incontinence   T2DM with hyperglycemia  Hypertension  Asthma    Issues Requiring Follow-Up  [ ] Prevena changed to soft dressing 3/14; on discharge, nursing facility may change prn   [ ] Patient to follow-up with Dr. Murguia in 2 weeks.    Test Results Pending At Discharge  Pending Labs       No current pending labs.            Hospital Course  Ms. Mayo is a 70y/o lady with history of T2DM (A1C 10.6%, 9/2024), HTN, asthma, and chronic thoracic spinal stenosis c/b LE weakness and urinary incontinence who presented due to acute worsening of LE weakness. S/p T9-11 laminectomy with PSIF with Dr. Murguia (3/8).  On 3/11/2025 patient had a worse neurological exam.  Underwent stat MRI, which thankfully showed improved appearance of nerve compression.  Patient started on 24 hours of steroids, with rapid improvement in symptoms.  Drains have been removed 3/15/2025 in the PM.  Discharged in stable condition on 3/14/25.     # Thoracic spinal stenosis  # LE weakness  # Urinary incontinence  Patient with 6mo history of slowly worsening back pain and LE weakness. Patient started developing urinary incontinence and was evaluated by neurosurgery on 8/2024. She was planned for surgery several times, but was unable to procedure due to an asthma exacerbation and then uncontrolled diabetes.   - MRI from [facility] with severe thoracic stenosis with cord compression and cord signal change at T9-10 and T10-11   - Ortho spine consulted, s/p T9-11 laminectomy with PSIF with Dr. Murguia (3/8)  - PT/OT consulted, recommended moderate intensity> SNF  - s/p dex 10q8h for 24 hours 3/11/25 with improvement      # T2DM with hyperglycemia   A1C 10.6 (9/2024).   Home regimen is lantus 48U at bedtime + lispro 20U TIDAC with metformin. Sugars control while inpatient was c/b steroid use.  - resume lantus 50U at bedtime + lispro 20U TIDAC  -SSI   -Hypoglycemia protocol    -Blood  glucose checks qAC & qHS   -Carbohydrate controlled diet     # HTN - continue home verapamil given sinus tachycardia, hold home losartan    # Asthma - continue home Breo + montelukast    Diet: carb controlled    Activity: up with assistance    Disposition:  SNF    Facility/Home Care Agency: [facility]    Procedures  3/8/25 - T9-11 laminectomy with PSIF with Dr. Murguia    Pertinent Physical Exam At Time of Discharge  GEN: well-appearing, no acute distress  HEENT: moist mucous membranes, no scleral icterus  NECK: Supple  CV: RRR, no murmurs/rubs/gallops  PULM: Breathing comfortably, clear to auscultation bilaterally  AB: Soft, non-tender, non-distended  : No in-dwelling naqvi  NEURO: A&Ox3, following commands, conversational    Home Medications     Medication List      START taking these medications     acetaminophen 500 mg tablet; Commonly known as: Tylenol; Take 2 tablets   (1,000 mg) by mouth every 6 hours.   HYDROmorphone 4 mg tablet; Commonly known as: Dilaudid; Take 1 tablet (4   mg) by mouth every 4 hours if needed for severe pain (7 - 10) for up to 7   days.   insulin glargine 100 unit/mL injection; Commonly known as: Lantus;   Inject 55 Units under the skin once daily at bedtime. Take as directed per   insulin instructions.; Replaces: Lantus Solostar U-100 Insulin 100 unit/mL   (3 mL) pen   methocarbamoL 1,000 mg tablet; Take 1,000 mg by mouth every 6 hours if   needed for muscle spasms.   polyethylene glycol 17 gram packet; Commonly known as: Glycolax,   Miralax; Take 17 g by mouth once daily.   sennosides 8.6 mg tablet; Commonly known as: Senokot; Take 2 tablets   (17.2 mg) by mouth once daily at bedtime.     CONTINUE taking these medications     albuterol 90 mcg/actuation inhaler; Inhale 2 puffs every 4 hours as   needed for shortness of breath. You should use your inhaler every 4 hours   while you are awake for the next 3 days, until your breathing is better   after your asthma flare.    aspirin 81 mg EC tablet   Blood glucose monitoring meter; 1 each 4 times a day.   blood pressure monitor kit; Commonly known as: Blood Pressure Kit; Use   as directed to check blood pressure twice daily.   cyanocobalamin 1,000 mcg tablet; Commonly known as: Vitamin B-12   ergocalciferol 1250 mcg (50,000 units) capsule; Commonly known as:   Vitamin D-2   FreeStyle Phu 3 Plus Sensor device; Generic drug: blood-glucose   sensor; Change sensor every 15 days   FreeStyle Phu 3 [place] misc; Generic drug: blood-glucose   meter,continuous; Use as instructed   gabapentin 300 mg capsule; Commonly known as: Neurontin   HumaLOG KwikPen Insulin 100 unit/mL pen; Generic drug: insulin lispro;   Inject 20 Units under the skin 3 times daily (morning, midday, late   afternoon). Take with meals. Take 27 units before meals while you are on   steroids and then go back to taking 20 units before meals once you are off   the steroids.   ipratropium-albuteroL 0.5-2.5 mg/3 mL nebulizer solution; Commonly known   as: Duo-Neb; Take 3 mL by nebulization every 6 hours if needed for   wheezing or shortness of breath.   latanoprost 0.005 % ophthalmic solution; Commonly known as: Xalatan   metFORMIN 500 mg tablet; Commonly known as: Glucophage   mometasone-formoterol 100-5 mcg/actuation inhaler; Commonly known as:   Dulera 100; Inhale 2 puffs 2 times a day.   montelukast 10 mg tablet; Commonly known as: Singulair; Take 1 tablet   (10 mg) by mouth once daily at bedtime.   multivitamin tablet   rosuvastatin 40 mg tablet; Commonly known as: Crestor   verapamil 120 mg tablet; Commonly known as: Calan     STOP taking these medications     Lantus Solostar U-100 Insulin 100 unit/mL (3 mL) pen; Generic drug:   insulin glargine; Replaced by: insulin glargine 100 unit/mL injection   losartan 100 mg tablet; Commonly known as: Cozaar     ASK your doctor about these medications     semaglutide 0.25 mg or 0.5 mg (2 mg/3 mL) pen injector; Inject 0.5 mg   under  the skin 1 (one) time per week.       Outpatient Follow-Up  Future Appointments   Date Time Provider Department Center   3/28/2025  1:00 PM Arun ELIZALDE MD [id number] Monroe Crawford MD        >30 minutes were spent on discharge coordination and planning.

## 2025-03-14 NOTE — PROGRESS NOTES
"Orthopaedic Surgery Progress Note    Subjective: NAEON. Pain is improving. Feels motivated to work on exercises and wants to get her strength back. Has not had a bowel movement.    Objective:  /66   Pulse 76   Temp 36.8 °C (98.2 °F)   Resp 18   Ht 1.676 m (5' 5.98\")   Wt 99.7 kg (219 lb 12.8 oz)   SpO2 97%   BMI 35.49 kg/m²     Gen: arousable, NAD, appropriately conversational  Cardiac: RRR to peripheral palpation  Resp: nonlabored on RA  GI: soft, nondistended    MSK:  MARIAN x1 in place    L1: SILT       L2: SILT      Hip flexors 2/5 Left; 2/5 Right  L3: SILT      Knee extension 2/5 Left; 2/5 Right  L4: SILT      Tib Ant. (Dorsiflexion) 5/5 Left; 5/5 Right  L5: SILT      EHL5/5 Left; 5/5 Right  S1: SILT      Plantar flexion 5/5 Left; 5/5 Right      Assessment/Plan: 69 y.o. female s/p T9-T11 PSIF and decompression, L2-3 laminectomy, dural repair on 3/8/25 with Dr. Murguia. Change in exam on 3/11, increased BLE weakness improved with decadron. Repeat MRI without evidence of new cord compression.    Plan:  - Decadron IV 10mg q8h for 3 doses (finished)  - Weight-bearing status: Ok to mobilize OOB ad samy  - Regular diet as tolerated, bowel regimen  - Multimodal pain  - OT/PT  - Reyna-operative Ancef for 48 hours  - Prevena changed to soft dressing 3/14; on discharge, nursing facility may change prn  - DVT ppx: SCDs, early mobility. May resume DVT ppx 72h postop (3/11 PM)    Dispo: Pending drain    Plan was discussed with attending Dr. Murguia    While admitted, this patient will be followed by the Ortho Spine Team, available via Epic Chat weekdays 6a-6p. Please page [phone] on nights and weekends.    Ortho Spine  First Call: John Colunga, PGY-2  Second Call: Alix Diaz, PGY-4  Third Call: Marquise Pastor, Fellow    John Colunga MD  Orthopedic Surgery, PGY-2  Available via Epic Chat    "

## 2025-03-26 DIAGNOSIS — M51.04 THORACIC DISC DISEASE WITH MYELOPATHY: ICD-10-CM

## 2025-03-28 ENCOUNTER — HOSPITAL ENCOUNTER (OUTPATIENT)
Dept: RADIOLOGY | Facility: CLINIC | Age: 70
Discharge: HOME | End: 2025-03-28
Payer: MEDICARE

## 2025-03-28 ENCOUNTER — OFFICE VISIT (OUTPATIENT)
Dept: ORTHOPEDIC SURGERY | Facility: CLINIC | Age: 70
End: 2025-03-28
Payer: MEDICARE

## 2025-03-28 DIAGNOSIS — M51.04 THORACIC DISC DISEASE WITH MYELOPATHY: ICD-10-CM

## 2025-03-28 DIAGNOSIS — T14.8XXD WOUND HEALING, DELAYED: Primary | ICD-10-CM

## 2025-03-28 DIAGNOSIS — M48.062 SPINAL STENOSIS OF LUMBAR REGION WITH NEUROGENIC CLAUDICATION: ICD-10-CM

## 2025-03-28 PROCEDURE — 1036F TOBACCO NON-USER: CPT | Performed by: STUDENT IN AN ORGANIZED HEALTH CARE EDUCATION/TRAINING PROGRAM

## 2025-03-28 PROCEDURE — 1111F DSCHRG MED/CURRENT MED MERGE: CPT | Performed by: STUDENT IN AN ORGANIZED HEALTH CARE EDUCATION/TRAINING PROGRAM

## 2025-03-28 PROCEDURE — 99211 OFF/OP EST MAY X REQ PHY/QHP: CPT | Performed by: STUDENT IN AN ORGANIZED HEALTH CARE EDUCATION/TRAINING PROGRAM

## 2025-03-28 PROCEDURE — 72080 X-RAY EXAM THORACOLMB 2/> VW: CPT

## 2025-03-28 PROCEDURE — 1159F MED LIST DOCD IN RCRD: CPT | Performed by: STUDENT IN AN ORGANIZED HEALTH CARE EDUCATION/TRAINING PROGRAM

## 2025-03-28 RX ORDER — SULFAMETHOXAZOLE AND TRIMETHOPRIM 800; 160 MG/1; MG/1
1 TABLET ORAL 2 TIMES DAILY
Qty: 20 TABLET | Refills: 0 | Status: SHIPPED | OUTPATIENT
Start: 2025-03-28 | End: 2025-04-07

## 2025-03-28 NOTE — PATIENT INSTRUCTIONS
- Start wet to dry dressing changes to lower lumbar incision twice daily  - Start Bactrim -160 tablets twice daily for 10 days  - Follow-up with Dr. Murguia in 1 week for wound check (will schedule appointment for next week)  - Continue PT/OT and rehabilitation: no heavy lifting >10 lbs, bending or twisting with the back

## 2025-03-28 NOTE — PROGRESS NOTES
Orthopaedic Spine Surgery Clinic Note    Patient ID: Abbie Mayo is a 69 y.o. female.    3 weeks s/p T9-11 laminectomy and PSIF with L2-3 laminectomy, dural repair on 3/8/2025.  Patient's postoperative course was complicated by some new onset bilateral quadriceps weakness.  Preoperatively, she was graded right 3 in the left 4-, however after her 2nd-3rd day after surgery, she started exhibiting a grade 2 in her quadricep strength.  We had this evaluated with a repeat MRI of her thoracic and lumbar spines which demonstrated improved space for the spinal cord in the thoracic spine in addition to the spinal nerves in her lumbar spine.  She did have multilevel lumbar stenosis and other areas of her spine.  She also had some very mild residual stenosis at the T10-11 segment and in the lateral recess at L2-3, however significantly improved from her preoperative imaging.  The official radiology read also confirmed that there was improved room for the spinal elements.  We opted to continue monitoring her weakness as we believed this was likely waxing/waning examination in the setting of an evolving and recovering spinal cord with the possibility of underlying deconditioning as well.    Patient ultimately discharged on 3/14/2025 to rehab.  She is currently presenting for her first follow-up appointment.  She reports significantly improved strength in her lower extremities.  She has taken some strides towards standing with a Delia lift with assistance.  She is not yet taking steps towards walking quite yet.  However she does admit that her strength is significantly improved.  She is not having much pain.  She denies any drainage to her lumbar incisions.  She is overall in good spirits.      Vitals & Measurements  There were no vitals filed for this visit.     Ortho Exam  General: AO x 3, NAD  Cardio: examined extremities perfused by peripheral palpation  Resp: breathing unlabored  Posterior thoracic and lumbar incisions  were examined.  Her thoracic incision was well-healed with nylon sutures in place.  Her lumbar incision demonstrated hypertrophic scarring with some delayed healing of the superficial skin and subcutaneous layer.  There was no deep tracking.  There is no active drainage or surrounding infective changes.      Lower Extremity  Right  Left  IP   2+/5  2+/5  Quadriceps  3/5  4-/5  Tibialis anterior  4/5  4/5  EHL   4/5  4/5  Gastrocnemius  5/5  5/5    Sensation: Generally normal to light touch throughout upper and lower extremities bilaterally.        Diagnostic Results - Imaging    XR thoracic spine today, 3/28/2025  Official read pending.  New upright AP and lateral radiographs of the thoracic spine were obtained in the office today.  These images are of good quality.  Demonstrated our instrumentation changes consistent with a T9-11 posterior spinal instrumented fusion with decompression.  There is no evidence of hardware loosening or displacement.  There is no acute fracture or dislocation.  Overall satisfactory appearance.        Diagnosis  Encounter Diagnoses   Name Primary?    Wound healing, delayed Yes    Thoracic disc disease with myelopathy     Spinal stenosis of lumbar region with neurogenic claudication           Assessment/Plan  Maria Esther Braun is a 69-year-old female who presents for 3-week follow-up s/p T9-11 laminectomy and PSIF with L2-3 laminectomy for thoracic myelopathy and severe lumbar stenosis.  Patient is overall doing much better clinically with improved lower extremity strength.  Her lower extremity strength now appears to be back to baseline if not somewhat improved from her preoperative examination.  Her XR evaluation today in the office demonstrates satisfactory hardware alignment without evidence for loosening or displacement.  Her posterior thoracic incision is well-healed without evidence of dehiscence or drainage.  Her nylon sutures were removed in the office today.  Her inferior lumbar  incision did demonstrate some element of hypertrophic scarring with delayed healing in the superficial skin/dermis.  I did remove the nylon sutures.  I did perform a superficial debridement of the lower lumbar incision in the office today and placed new dressings.    We will recommend wet-to-dry twice daily dressing changes to the lower lumbar incision for the next week.  I also placed a prescription for p.o. Bactrim DS to be taken for 10 days to help assist with wound healing.  Patient will follow-up with me in approximately 1 week for repeat wound check next week in the office.  She should continue no heavy lifting greater than 10 pounds, bending, or significant twisting with the back.  These instructions were detailed in her AVS and provided to her transport team to bring back to her rehab center.    Patient will follow-up with me in 1 week for repeat wound check. After our discussion, the patient articulated understanding of the plan and felt that all questions had been answered satisfactorily. The patient was pleased with the visit and very appreciative for the care rendered.    **Please excuse any errors in grammar or translation related to this dictation. Voice recognition software was utilized to prepare this document. **    F/u 1 week. Wound check.       Orders Placed This Encounter    sulfamethoxazole-trimethoprim (Bactrim DS) 800-160 mg tablet       --    Arun Murguia MD  Orthopaedic Spine Surgery  , Department of Orthopaedic Surgery  [facility]

## 2025-04-04 ENCOUNTER — DOCUMENTATION (OUTPATIENT)
Dept: ORTHOPEDIC SURGERY | Facility: HOSPITAL | Age: 70
End: 2025-04-04
Payer: MEDICARE

## 2025-04-04 ENCOUNTER — DOCUMENTATION (OUTPATIENT)
Dept: ORTHOPEDICS | Facility: HOSPITAL | Age: 70
End: 2025-04-04
Payer: MEDICARE

## 2025-04-04 ENCOUNTER — APPOINTMENT (OUTPATIENT)
Dept: ORTHOPEDIC SURGERY | Facility: CLINIC | Age: 70
End: 2025-04-04
Payer: MEDICARE

## 2025-04-04 NOTE — PROGRESS NOTES
I called Ms. Mayo's nursing facility to follow-up.  They did reach out to be saying that they would have difficulties making it to her Monday appointment as well.  I have been in active communication with the patient's nurse at the nursing facility in order to facilitate an appropriate appointment time that would allow for transportation arrangement so that I could evaluate the patient.  We have been working with the nursing facility in order to find an appropriate appointment, unfortunately this did not occur today.  I will follow-up with the patient's nursing facility again early next week in order to find an appointment time that could work.    In the meantime, the nurse was kind enough to send me a picture of the patient's wound which appears to be well-healing:        We will continue daily dry dressing changes as instructed.  Patient should also continue the Bactrim that she was prescribed.  We will have the patient follow-up hopefully next week for a wound check.    --    Arun Murguia MD  Orthopaedic Spine Surgery  , Department of Orthopaedic Surgery  [facility]

## 2025-04-04 NOTE — PROGRESS NOTES
"Patient did not present for her scheduled appointment 1 week follow-up with regards to her lumbar wound.  During her last visit with me, we did have interval XR evaluation as well as clinical evaluation.  Patient was doing very well from a clinical standpoint with baseline to slightly improve lower extremity function.  Her XR evaluation also demonstrated satisfactory appearance of her hardware.  We were wanting her to follow-up 1 week later for evaluation of her lower lumbar wound which did demonstrate some hypertrophic scarring with skin separation superficially however good granulation bed within the wound.  I did order the patient for p.o. Bactrim as well as daily dressing changes.  Patient was to follow-up with me today to evaluate this wound.    I called the patient's  to follow-up on this.  Patient remains admitted to the skilled nursing facility.  He was frustrated as he states that the nursing facility was supposed to reach out to my office to notify me that they could not arrange for transport in order to have the patient come to the office.  He did provide me the phone number for the facility to call directly.    I did call the nursing facility directly.  I was transferred to the nursing station.  She was not exactly sure the reasons why the appointment transportation was canceled.  She did relate to me that it was possibly due to the patient requiring a gurney.  Patient was able to transport to my office last week with a gurney without issue.  The skilled nursing desk was not able to provide further information as to why the appointment transportation was canceled.  I did ask how her wound was looking, the nurse had not evaluated this as she states \"she had not been there for a couple days\".  She did confirm with me that she was receiving daily dressing changes and that per her report the wound was looking okay.    I did provide my direct contact information for the nursing facility to keep me " updated on any logistical concerns moving forward.  I did reschedule the patient for this coming Monday, 4/7/2025 at 10 AM at my [facility] office in order to evaluate the patient's wound and perform a repeat clinical check.  I did ask that the nursing facility keep me is updated as possible with regards to any logistical concerns with making this appointment.  I will make sure this appointment reschedule was reflected on my end.    After our discussion, the patient articulated understanding of the plan and felt that all questions had been answered satisfactorily. The patient was pleased with the visit and very appreciative for the care rendered.    **Please excuse any errors in grammar or translation related to this dictation. Voice recognition software was utilized to prepare this document. **      --    Arun Murguia MD  Orthopaedic Spine Surgery  , Department of Orthopaedic Surgery  [facility]

## 2025-04-07 ENCOUNTER — DOCUMENTATION (OUTPATIENT)
Dept: ORTHOPEDICS | Facility: HOSPITAL | Age: 70
End: 2025-04-07
Payer: MEDICARE

## 2025-04-07 NOTE — PROGRESS NOTES
I called Ms. Mayo's nursing facility again to follow-up.  We were able to get connected with the nursing station who is covering her.  She was able to provide some updates on the patient's wound.  Per the nurses opinion, the wound was continuing to heal adequately without concerns for infection.    We were able to coordinate with the facility and the transportation services to have the patient follow-up with my office on Friday, April 11 at 9 AM.  This was confirmed on their end and we will put in a confirmation to schedule this on my schedule.  This will be for a clinical as well as a wound and radiographic check.    --    Arun Murguia MD  Orthopaedic Spine Surgery  , Department of Orthopaedic Surgery  [facility]

## 2025-04-11 ENCOUNTER — APPOINTMENT (OUTPATIENT)
Dept: ORTHOPEDIC SURGERY | Facility: CLINIC | Age: 70
End: 2025-04-11
Payer: MEDICARE

## 2025-05-07 DIAGNOSIS — M51.04 THORACIC DISC DISEASE WITH MYELOPATHY: ICD-10-CM

## 2025-05-08 ENCOUNTER — APPOINTMENT (OUTPATIENT)
Dept: ORTHOPEDIC SURGERY | Facility: CLINIC | Age: 70
End: 2025-05-08
Payer: MEDICARE

## 2025-05-09 ENCOUNTER — OFFICE VISIT (OUTPATIENT)
Dept: ORTHOPEDIC SURGERY | Facility: CLINIC | Age: 70
End: 2025-05-09
Payer: MEDICARE

## 2025-05-09 ENCOUNTER — HOSPITAL ENCOUNTER (OUTPATIENT)
Dept: RADIOLOGY | Facility: CLINIC | Age: 70
Discharge: HOME | End: 2025-05-09
Payer: MEDICARE

## 2025-05-09 DIAGNOSIS — M51.04 THORACIC DISC DISEASE WITH MYELOPATHY: ICD-10-CM

## 2025-05-09 DIAGNOSIS — M48.062 SPINAL STENOSIS OF LUMBAR REGION WITH NEUROGENIC CLAUDICATION: ICD-10-CM

## 2025-05-09 DIAGNOSIS — M51.04 THORACIC DISC DISEASE WITH MYELOPATHY: Primary | ICD-10-CM

## 2025-05-09 PROCEDURE — 72080 X-RAY EXAM THORACOLMB 2/> VW: CPT | Performed by: RADIOLOGY

## 2025-05-09 PROCEDURE — 1159F MED LIST DOCD IN RCRD: CPT | Performed by: STUDENT IN AN ORGANIZED HEALTH CARE EDUCATION/TRAINING PROGRAM

## 2025-05-09 PROCEDURE — 72080 X-RAY EXAM THORACOLMB 2/> VW: CPT

## 2025-05-09 PROCEDURE — 99211 OFF/OP EST MAY X REQ PHY/QHP: CPT | Performed by: STUDENT IN AN ORGANIZED HEALTH CARE EDUCATION/TRAINING PROGRAM

## 2025-05-09 PROCEDURE — 1125F AMNT PAIN NOTED PAIN PRSNT: CPT | Performed by: STUDENT IN AN ORGANIZED HEALTH CARE EDUCATION/TRAINING PROGRAM

## 2025-05-09 ASSESSMENT — PAIN SCALES - GENERAL: PAINLEVEL_OUTOF10: 6

## 2025-05-09 ASSESSMENT — PAIN - FUNCTIONAL ASSESSMENT: PAIN_FUNCTIONAL_ASSESSMENT: 0-10

## 2025-05-09 NOTE — PROGRESS NOTES
Orthopaedic Spine Surgery Clinic Note    Patient ID: Abbie Mayo is a 69 y.o. female.    9 weeks s/p T9-11 laminectomy and PSIF with L2-3 laminectomy, dural repair on 3/8/2025.  Patient reports that she is overall doing okay.  She continues to reside at the nursing facility and is working on therapy and rehabilitation.  We had some issues with her transportation to be able to arrange a follow-up visit in an appropriate timeframe to be able to track her posterior lumbar incision.  She now reports approximately 6 weeks after her initial visit with us.    She has started to attempt standing and ambulating using a Delia lift.  She does experience significant back spasms and pain when she attempts to stand and walk.  Overall she does report improved strength in her lower extremities.  She is in good spirits today.      Vitals & Measurements  There were no vitals filed for this visit.     Ortho Exam  General: AO x 3, NAD  Cardio: examined extremities perfused by peripheral palpation  Resp: breathing unlabored  Posterior thoracic and lumbar incisions were examined.  Her thoracic incision was well-healed.   Her lumbar incision is also now well-healed with hypertrophic scar without evidence of dehiscence or drainage.  No surrounding infective changes.      Lower Extremity  Right  Left  IP   3/5  3/5  Quadriceps  4/5  4-/5  Tibialis anterior  4/5  4/5  EHL   4/5  4/5  Gastrocnemius  5/5  5/5    Sensation: Generally normal to light touch throughout upper and lower extremities bilaterally.        Diagnostic Results - Imaging    XR thoracic spine today, 5/9/2025  Official read pending.  New upright AP and lateral radiographs of the thoracic spine were obtained in the office today.  These images are of good quality.  Demonstrated are instrumentation changes consistent with a T9-11 posterior spinal instrumented fusion with decompression and L2-3 decompression.  There is no evidence of hardware loosening or displacement.  There  is no acute fracture or dislocation.  Overall satisfactory appearance.        Diagnosis  Encounter Diagnoses   Name Primary?    Thoracic disc disease with myelopathy Yes    Spinal stenosis of lumbar region with neurogenic claudication           Assessment/Plan  Maria Esther Braun is a 69-year-old female who presents for 9-week follow-up s/p T9-11 laminectomy and PSIF with L2-3 laminectomy for thoracic myelopathy and severe lumbar stenosis on 3/8/2025.  Patient continues to exhibit improved lower extremity strength on today's examination with improved bilateral hip flexor and quadriceps function.  Her posterior thoracolumbar incisions are now well-healed.  We reviewed her XR evaluation today in the office which demonstrates satisfactory hardware alignment.    We discussed continuing precautions with no heavy lifting greater than 25 pounds but okay to start initiating some bending and twisting maneuvers with the lower back.  I am okay with her mobilizing ad samy. and making efforts towards standing and walking with what ever maneuvers are necessary for her to advance in her mobilization.  It is okay to stop dressing changes at this time given that her incisions are healed and leave them open to air.      We did spend some time discussing the nature of her disability.  Although she does exhibit improved lower extremity strength and function, she is still having limitations in her ability to stand and walk.  We discussed how nerves can take up to 1 year to fully heal and that time will tell to see how much she can regain.  We did discuss the palliative nature of her surgery and how it was primarily geared towards preventing her from worsening in her condition.  We also discussed how her diabetes can predispose to nerve damage as well.  Overall, I did explain to her that her rehabilitation process is continuing appropriately and that we will see how much she is able to regain over time.  The goal is still for her to stand and to  be able to take steps towards hopefully walking.  However, time will tell to see how much she will regain and that regard.    Patient will follow-up with me in 8 weeks for repeat clinical and radiographic check. After our discussion, the patient articulated understanding of the plan and felt that all questions had been answered satisfactorily. The patient was pleased with the visit and very appreciative for the care rendered.    **Please excuse any errors in grammar or translation related to this dictation. Voice recognition software was utilized to prepare this document. **    F/u 8 weeks. AP and lateral thoracolumbar XR.       No orders of the defined types were placed in this encounter.      --    Arun Murguia MD  Orthopaedic Spine Surgery  , Department of Orthopaedic Surgery  [facility]

## 2025-05-09 NOTE — PATIENT INSTRUCTIONS
- Okay to stop dressing changes to incisions and leave open to air  - No heavy lifting >25 lbs but okay to start bending and twisting with back  - Okay to mobilize ad samy and make efforts towards standing and walking  - Follow-up in 8 weeks

## 2025-06-26 DIAGNOSIS — M51.04 THORACIC DISC DISEASE WITH MYELOPATHY: ICD-10-CM

## 2025-06-27 ENCOUNTER — APPOINTMENT (OUTPATIENT)
Dept: ORTHOPEDIC SURGERY | Facility: CLINIC | Age: 70
End: 2025-06-27
Payer: MEDICARE

## 2025-06-27 ENCOUNTER — APPOINTMENT (OUTPATIENT)
Dept: RADIOLOGY | Facility: CLINIC | Age: 70
End: 2025-06-27
Payer: MEDICARE

## 2025-07-18 ENCOUNTER — HOSPITAL ENCOUNTER (OUTPATIENT)
Dept: RADIOLOGY | Facility: CLINIC | Age: 70
Discharge: HOME | End: 2025-07-18
Payer: MEDICARE

## 2025-07-18 ENCOUNTER — OFFICE VISIT (OUTPATIENT)
Dept: ORTHOPEDIC SURGERY | Facility: CLINIC | Age: 70
End: 2025-07-18
Payer: MEDICARE

## 2025-07-18 DIAGNOSIS — M51.04 THORACIC DISC DISEASE WITH MYELOPATHY: ICD-10-CM

## 2025-07-18 PROCEDURE — G2211 COMPLEX E/M VISIT ADD ON: HCPCS | Performed by: STUDENT IN AN ORGANIZED HEALTH CARE EDUCATION/TRAINING PROGRAM

## 2025-07-18 PROCEDURE — 99213 OFFICE O/P EST LOW 20 MIN: CPT | Performed by: STUDENT IN AN ORGANIZED HEALTH CARE EDUCATION/TRAINING PROGRAM

## 2025-07-18 PROCEDURE — 99214 OFFICE O/P EST MOD 30 MIN: CPT | Performed by: STUDENT IN AN ORGANIZED HEALTH CARE EDUCATION/TRAINING PROGRAM

## 2025-07-18 PROCEDURE — 72080 X-RAY EXAM THORACOLMB 2/> VW: CPT | Performed by: RADIOLOGY

## 2025-07-18 PROCEDURE — 72080 X-RAY EXAM THORACOLMB 2/> VW: CPT

## 2025-07-18 PROCEDURE — 1159F MED LIST DOCD IN RCRD: CPT | Performed by: STUDENT IN AN ORGANIZED HEALTH CARE EDUCATION/TRAINING PROGRAM

## 2025-07-18 ASSESSMENT — PAIN - FUNCTIONAL ASSESSMENT: PAIN_FUNCTIONAL_ASSESSMENT: 0-10

## 2025-07-18 ASSESSMENT — PAIN SCALES - GENERAL: PAINLEVEL_OUTOF10: 5 - MODERATE PAIN

## 2025-07-18 NOTE — PROGRESS NOTES
Orthopaedic Spine Surgery Clinic Note    Patient ID: Abbie Mayo is a 69 y.o. female.    4.5 months s/p T9-11 laminectomy and PSIF with L2-3 laminectomy, dural repair on 3/8/2025.  Patient is overall doing much better.  She is now back at home and working with home PT.  She was able to stand for the first time this past week and a long time.  She is quite happy with her progress thus far although a little frustrated about how slow her recovery has been.  She does continue to report that her legs feel stronger and less numb after surgery.    She is reporting some URI symptoms that have been rather persistent over the past month.  She has tried to have this evaluated with her PCP, although relates some frustrations with regards to care from her primary care team.  She is in the process potentially of switching primary care providers.      Vitals & Measurements  There were no vitals filed for this visit.     Ortho Exam  General: AO x 3, NAD  Cardio: examined extremities perfused by peripheral palpation  Resp: breathing unlabored  Posterior thoracic and lumbar incisions well-healed      Lower Extremity  Right  Left  IP   3/5  2-3/5  Quadriceps  4/5  4-/5  Tibialis anterior  4/5  4/5  EHL   4/5  4/5  Gastrocnemius  5/5  5/5    Sensation: Generally normal to light touch throughout upper and lower extremities bilaterally.        Diagnostic Results - Imaging    XR thoracic spine today, 7/18/2025  Official read pending.  New upright AP and lateral radiographs of the thoracic spine were obtained in the office today.  These images are of good quality.  Demonstrated are instrumentation changes consistent with a T9-11 posterior spinal instrumented fusion with decompression and L2-3 decompression.  There is no evidence of hardware loosening or displacement.  There is no acute fracture or dislocation.  Overall satisfactory appearance.        Diagnosis  Encounter Diagnosis   Name Primary?    Thoracic disc disease with myelopathy            Assessment/Plan  Maria Esther Braun is a 69-year-old female who presents for 4.5-month follow-up s/p T9-11 laminectomy and PSIF with L2-3 laminectomy for thoracic myelopathy and severe lumbar stenosis on 3/8/2025.  Patient continues to do well with stable lower extremity examination.  She continues to feel better after surgery with improved strength and sensation in the lower extremities.  She was able to stand up this past week with assistance for the first time in a long time which she is quite happy about.  She continues with home PT and a progressive mobility protocol.  Her thoracolumbar incisions are now well-healed.  Her upright XR imaging today in the office demonstrates no significant hardware abnormalities.    We discussed advancing her spinal precautions at this juncture.  She may resume activities as tolerated without any restrictions.      We again spent some time discussing the expectations of her condition as well as her surgery.  Patient presented to [facility] with severe thoracic myelopathy with significant lower extremity weakness and inability to ambulate.  We discussed the nature of her surgery was primarily palliative in order to prevent her course from worsening.  Patient also is a severe diabetic which also confounds her neurologic picture.  However, she has made significant strides since her original presentation to the hospital, with improved lower extremity strength and sensation in the ability to now stand for the first time in a long time.  We discussed that ultimately she may still have some deficits in her ability to mobilize and function, however she is significantly improved from where she was originally.  Patient understood the above and will continue the rehabilitation process as she has already made some significant progress.    Patient will follow-up with me in 2 months for repeat clinical and radiographic check. After our discussion, the patient articulated understanding of the  plan and felt that all questions had been answered satisfactorily. The patient was pleased with the visit and very appreciative for the care rendered.    **Please excuse any errors in grammar or translation related to this dictation. Voice recognition software was utilized to prepare this document. **    F/u 2 months. AP and lateral thoracolumbar XR.       Orders Placed This Encounter    XR thoracolumbar spine 2 views       --    Arun Murguia MD  Orthopaedic Spine Surgery  , Department of Orthopaedic Surgery  [facility]

## (undated) DEVICE — LOOP, VESSEL, MAXI, RED

## (undated) DEVICE — SUTURE, SILK, 2-0, 30 IN, SH, BLACK

## (undated) DEVICE — STRIP, SKIN CLOSURE, STERI STRIP, REINFORCED, 0.5 X 4 IN

## (undated) DEVICE — REST, HEAD, BAGEL, 9 IN

## (undated) DEVICE — 1 ML TUBERCULIN SYRINGE,DETACHABLE NEEDLE: Brand: MONOJECT

## (undated) DEVICE — SOLUTION IRRIGATION BAL SALT SOLUTION 15 ML STRL BSS

## (undated) DEVICE — COVER, CART, 45 X 27 X 48 IN, CLEAR

## (undated) DEVICE — MARKER,SKIN,WI/RULER AND LABELS: Brand: MEDLINE

## (undated) DEVICE — THERAPY UNIT, PREVENA PLUS 125

## (undated) DEVICE — DRAIN, WOUND, ROUND, W/TROCAR, HOLE PATTERN, 10 IN, MEDIUM, 1/8 X 49 IN

## (undated) DEVICE — SHIELD EYE W3XL2.5IN UNIV CLR PLAS LTWT

## (undated) DEVICE — GLOVE SURG SZ 8 CRM LTX FREE POLYISOPRENE POLYMER BEAD ANTI

## (undated) DEVICE — SUTURE, SILK, 3-0, 30 IN, MULTIPACK, BLACK

## (undated) DEVICE — CANNULA OPHTH 25GA 7 8IN ORNG 45DEG ANG 4MM FR END DOME SHP

## (undated) DEVICE — EYE EXTRAS

## (undated) DEVICE — CLIP, LIGATING, HORIZON, LARGE, TITANIUM

## (undated) DEVICE — TIP, SUCTION, FRAZIER, W/CONTROL VENT, 12 FR

## (undated) DEVICE — NEEDLE FLTR 18GA L1.5IN MEM THK5UM BLNT DISP

## (undated) DEVICE — COVER, TABLE, UHC

## (undated) DEVICE — GLOVE SURG SZ 65 CRM LTX FREE POLYISOPRENE POLYMER BEAD ANTI

## (undated) DEVICE — COVER MICROSCOPE KNOB SM

## (undated) DEVICE — GLOVE ORANGE PI 7   MSG9070

## (undated) DEVICE — SUTURE, VICRYL, 4-0, 18 IN, UNDYED BR PS-2

## (undated) DEVICE — NEEDLE RETROBLB 25GA L38MM STR SHFT DISP ATKNSN

## (undated) DEVICE — GOWN, ASTOUND, XL

## (undated) DEVICE — DRAPE, INCISE, ANTIMICROBIAL, IOBAN 2, STERI DRAPE, 23 X 33 IN, DISPOSABLE, STERILE

## (undated) DEVICE — ELECTRODE, ELECTROSURGICAL, BLADE, EXTENDED, 6.5 IN, STAINLESS STEEL

## (undated) DEVICE — SYRINGE, LUER LOCK, 5ML: Brand: MEDLINE

## (undated) DEVICE — SUTURE, VICRYL, 2-0, 27 IN, FSL, UNDYED

## (undated) DEVICE — SUTURE VCRL SZ 8-0 L5IN ABSRB VLT L5MM BV130-4 3/8 CIR J405G

## (undated) DEVICE — CLIPPER, SURGICAL BLADE ASSEMBLY, GENERAL PURPOSE, SINGLE USE

## (undated) DEVICE — SOLUTION IV IRRIG WATER 1000ML POUR BRL 2F7114

## (undated) DEVICE — 40436 HEAD REST OCULAR: Brand: 40436 HEAD REST OCULAR

## (undated) DEVICE — LOOP, VESSEL, MAXI, BLUE

## (undated) DEVICE — SUTURE, VICRYL, 3-0, 54 IN, TIE, VIOLET

## (undated) DEVICE — NEEDLE, ELECTRODE, SUBDERMAL, PAIRED, 2.0 LEAD, DISP

## (undated) DEVICE — ELECTRODE, CORKSCREW NEEDLE 1.5M LENGTH

## (undated) DEVICE — SPHERE, STEALTHSTATION, 5-PK

## (undated) DEVICE — GLASSES SAFETY PROTCT GRN

## (undated) DEVICE — CATHETER TRAY, SURESTEP, 16FR, URINE METER W/STATLOCK

## (undated) DEVICE — CATHETER, THORACIC, STRAIGHT, ADULT, 28 FR, PVC

## (undated) DEVICE — SUTURE COAT VCRL SZ 6-0 L18IN ABSRB VLT S-29 L7.6MM 1/4 CIR J555G

## (undated) DEVICE — SEALANT, DURASEAL EXACT, 5ML

## (undated) DEVICE — Device

## (undated) DEVICE — SPEAR SURG TRIANG SHP HNDL PCH WECK-CEL

## (undated) DEVICE — DRESSING, PREVENA, PEEL AND PLACE, 35CM

## (undated) DEVICE — NEEDLE HYPO 22GA L1.5IN BLK POLYPR HUB S STL REG BVL STR

## (undated) DEVICE — PAD PREP L 2 PLY 70% ISO ALC NONWOVEN SFT ABSRB TOP ANTISEP

## (undated) DEVICE — SUTURE, SILK, 2-0, TIES, 12-30 IN, BLACK

## (undated) DEVICE — MANIFOLD, 4 PORT NEPTUNE STANDARD

## (undated) DEVICE — EVACUATOR, WOUND, CLOSED, 3 SPRING, 400 CC, Y CONNECTING TUBE

## (undated) DEVICE — CLIP, LIGATING, HORIZON, MEDIUM, TITANIUM

## (undated) DEVICE — SET PHACO

## (undated) DEVICE — PACK PROCEDURE SURG SURG CATARACT CUSTOM

## (undated) DEVICE — BONE, MILL, MIDAS REX, DUAL BLADE, ELECTRIC

## (undated) DEVICE — SPONGE, DISSECTOR, PEANUT, 3/8, STERILE 5 FOAM HOLDER"

## (undated) DEVICE — DRAPE, SHEET, FAN FOLDED, HALF, 44 X 58 IN, DISPOSABLE, LF, STERILE

## (undated) DEVICE — PROBE HEMSTAT 18GA ERAS BVL TIP STR BPLR WET-FIELD

## (undated) DEVICE — SUTURE, SILK, 0, 24 IN, SUTUPAK, BLACK

## (undated) DEVICE — STERILE POLYISOPRENE POWDER-FREE SURGICAL GLOVES: Brand: PROTEXIS

## (undated) DEVICE — DRESSING, GAUZE, WASHED FLUFF, LARGE, STERILE

## (undated) DEVICE — BUR,  3MM X 3.8MM, NEURODRILL, LESS AGGR

## (undated) DEVICE — PAD,EYE,1-5/8X2 5/8,STERILE,LF,1/PK: Brand: MEDLINE

## (undated) DEVICE — SYRINGE, LUER LOCK, 10ML: Brand: MEDLINE

## (undated) DEVICE — COLLECTOR SHRP 3GAL YEL CHEMO

## (undated) DEVICE — SCALPEL 15 DEG NO 715

## (undated) DEVICE — COLLECTION UNIT, DRAINAGE, THORACIC, SINGLE TUBE, DRY SUCTION, ATS COMPATIBLE, OASIS 3600, LF

## (undated) DEVICE — NEEDLE HYPO 30GA L0.5IN BGE POLYPR HUB S STL REG BVL STR

## (undated) DEVICE — ALCON INTREPID CURVED 0.3MM POLYMER I/A TIP: Brand: ALCON, INTREPID

## (undated) DEVICE — ELECTRODE, GROUND PLATE

## (undated) DEVICE — EVACUATOR, WOUND, SUCTION, CLOSED, JACKSON-PRATT, 100 CC, SILICONE

## (undated) DEVICE — DRAIN, WOUND, FLAT, HUBLESS, FULL LENGTH PERFORATION, 10 MM X 20 CM, SILICONE

## (undated) DEVICE — CATHETER, IV, ANGIOCATH, 20 G X 1.16 IN, FEP POLYMER

## (undated) DEVICE — SPONGE, GAUZE, XRAY DECT, 16 PLY, 4 X 4, W/MASTER DMT,STERILE

## (undated) DEVICE — DRESSING, NON-ADHERENT, OIL EMULSION, CURITY, 3 X 8 IN, STERILE

## (undated) DEVICE — STAPLER, SKIN PROXIMATE, 35 WIDE

## (undated) DEVICE — SUTURE, VICRYL, 1, 27 IN, TP-1, VIOLET